# Patient Record
Sex: FEMALE | Race: WHITE | Employment: FULL TIME | URBAN - METROPOLITAN AREA
[De-identification: names, ages, dates, MRNs, and addresses within clinical notes are randomized per-mention and may not be internally consistent; named-entity substitution may affect disease eponyms.]

---

## 2017-02-01 DIAGNOSIS — C50.411 BREAST CANCER OF UPPER-OUTER QUADRANT OF RIGHT FEMALE BREAST (HCC): Primary | ICD-10-CM

## 2017-02-02 ENCOUNTER — TELEPHONE (OUTPATIENT)
Dept: ONCOLOGY | Age: 57
End: 2017-02-02

## 2017-02-02 NOTE — TELEPHONE ENCOUNTER
Dr. Dagoberto Muir office to set up this patient and they stated they will review the patients information and call the patient to schedule the appointment.

## 2017-02-09 RX ORDER — PREGABALIN 50 MG/1
CAPSULE ORAL
Qty: 90 CAP | Refills: 1 | Status: SHIPPED | OUTPATIENT
Start: 2017-02-09 | End: 2017-04-19 | Stop reason: SDUPTHER

## 2017-03-13 ENCOUNTER — TELEPHONE (OUTPATIENT)
Dept: ONCOLOGY | Age: 57
End: 2017-03-13

## 2017-03-13 NOTE — TELEPHONE ENCOUNTER
Called [facility] to check and see if the pts appointment was made and they stated on 3/8/17 the pt was seen.

## 2017-03-22 RX ORDER — TOREMIFENE CITRATE 60 MG/1
TABLET ORAL
Qty: 30 TAB | Refills: 5 | Status: SHIPPED | OUTPATIENT
Start: 2017-03-22 | End: 2017-09-14 | Stop reason: SDUPTHER

## 2017-03-24 ENCOUNTER — TELEPHONE (OUTPATIENT)
Dept: ONCOLOGY | Age: 57
End: 2017-03-24

## 2017-03-24 NOTE — TELEPHONE ENCOUNTER
Patient called and stated she would like to cancel her upcoming appointment. Patient stated that she is being followed by other doctors and that she thought the upcoming appointment was just a follow up any ways.

## 2017-04-17 NOTE — TELEPHONE ENCOUNTER
Per CARLOS Veronica Dr. would like to follow up with the patient to see how she is tolerating her Toremifene in June 2017 or July 2017. The patient is scheduled for an office visit on 6/19/17.

## 2017-04-25 ENCOUNTER — PATIENT MESSAGE (OUTPATIENT)
Dept: ONCOLOGY | Age: 57
End: 2017-04-25

## 2017-04-25 ENCOUNTER — HOSPITAL ENCOUNTER (OUTPATIENT)
Dept: GENERAL RADIOLOGY | Age: 57
Discharge: HOME | End: 2017-04-25
Payer: COMMERCIAL

## 2017-04-25 ENCOUNTER — TELEPHONE (OUTPATIENT)
Dept: ONCOLOGY | Age: 57
End: 2017-04-25

## 2017-04-25 ENCOUNTER — OFFICE VISIT (OUTPATIENT)
Dept: ONCOLOGY | Age: 57
End: 2017-04-25

## 2017-04-25 VITALS
RESPIRATION RATE: 18 BRPM | HEIGHT: 63 IN | BODY MASS INDEX: 36.11 KG/M2 | DIASTOLIC BLOOD PRESSURE: 71 MMHG | HEART RATE: 107 BPM | OXYGEN SATURATION: 97 % | SYSTOLIC BLOOD PRESSURE: 124 MMHG | TEMPERATURE: 97.1 F | WEIGHT: 203.8 LBS

## 2017-04-25 DIAGNOSIS — G89.29 CHRONIC NONINTRACTABLE HEADACHE, UNSPECIFIED HEADACHE TYPE: ICD-10-CM

## 2017-04-25 DIAGNOSIS — F41.9 ANXIETY: ICD-10-CM

## 2017-04-25 DIAGNOSIS — E55.9 VITAMIN D DEFICIENCY: ICD-10-CM

## 2017-04-25 DIAGNOSIS — C50.411 BREAST CANCER OF UPPER-OUTER QUADRANT OF RIGHT FEMALE BREAST (HCC): ICD-10-CM

## 2017-04-25 DIAGNOSIS — G62.9 NEUROPATHY: ICD-10-CM

## 2017-04-25 DIAGNOSIS — M85.80 OSTEOPENIA, UNSPECIFIED LOCATION: ICD-10-CM

## 2017-04-25 DIAGNOSIS — R51.9 CHRONIC NONINTRACTABLE HEADACHE, UNSPECIFIED HEADACHE TYPE: ICD-10-CM

## 2017-04-25 DIAGNOSIS — C50.411 BREAST CANCER OF UPPER-OUTER QUADRANT OF RIGHT FEMALE BREAST (HCC): Primary | ICD-10-CM

## 2017-04-25 DIAGNOSIS — M79.651 RIGHT THIGH PAIN: ICD-10-CM

## 2017-04-25 DIAGNOSIS — B37.2 SKIN YEAST INFECTION: ICD-10-CM

## 2017-04-25 DIAGNOSIS — M25.50 ARTHRALGIA, UNSPECIFIED JOINT: ICD-10-CM

## 2017-04-25 PROCEDURE — 73552 X-RAY EXAM OF FEMUR 2/>: CPT

## 2017-04-25 PROCEDURE — 72170 X-RAY EXAM OF PELVIS: CPT

## 2017-04-25 RX ORDER — NITROFURANTOIN 25; 75 MG/1; MG/1
CAPSULE ORAL
Refills: 2 | COMMUNITY
Start: 2017-04-19 | End: 2018-06-13 | Stop reason: ALTCHOICE

## 2017-04-25 RX ORDER — ALPRAZOLAM 0.25 MG/1
TABLET ORAL
Refills: 1 | COMMUNITY
Start: 2017-04-13 | End: 2021-10-27

## 2017-04-25 RX ORDER — PREGABALIN 50 MG/1
CAPSULE ORAL
Qty: 90 CAP | Refills: 1 | Status: SHIPPED | OUTPATIENT
Start: 2017-04-25 | End: 2017-07-05 | Stop reason: SDUPTHER

## 2017-04-25 NOTE — TELEPHONE ENCOUNTER
Received a call from the patient and verified ID x 2. The patient stated that she has \"severe pain\" in her right thigh and leg when she moves it and that there is no pain when it is not moving but when she moves it is a \"very sharp\" pain and rates it a ten out of ten pain on a scale of one to ten with ten being the worst pain and that she took Flexeril last night because she thought it may be muscular but it did not ease the pain \"at all\" and she feels that the pain is \"deeper\" than the muscle. Informed the patient that Dr. Marietta Hassan is ordering an x-ray and inquired if the patient would be able to have that completed today 4/25/17. The patient stated that she would be able to have that done today. Also, informed the patient that Dr. Marietta Hassan would like to see her tomorrow 4/26/17 and the patient verbalized understanding and denied any further questions or concerns. The call was transferred to Paul Spain to schedule an appointment for the patient tomorrow.

## 2017-04-25 NOTE — PROGRESS NOTES
Discussed results with patient. Offered her to come see us in the clinic today for further evaluation of her right thigh pain. She will come see me today between 3 and 3:30.

## 2017-04-25 NOTE — TELEPHONE ENCOUNTER
Called the patient's listed [facility] pharmacy and left a message with two patient identifiers and the patient's prescription for Lyrica 50 mg capsules. The paper prescription was shredded and discarded.

## 2017-04-25 NOTE — PROGRESS NOTES
[employer]  [address] [place] [address], [address] [facility]  [address]  W: [phone]  F: [phone]     f/u HEME/ONC CONSULT    Reason for visit: management of     Breast Cancer    Referring physician:  Dr. Tomas Yan physician:  Dr. Nabila Sharma, Dr. Ebenezer Peres    HPI:   Willy Collado is a 55 y.o.  female who I am seeing in f/u for management of breast cancer. She states she had a negative mammogram in 11/2013 and then felt a mass in her right breast in late 2/2014. Menarche at 16, first of 2 pregnancies at 26, menopause at 51, no history of HRT. FH + for her mother having breast cancer in her late 46s. No FH of ovarian cancer. Biopsy on 3/6/14 shows invasive mammary carcinoma, 0.7 cm, gr 3, ER + > 90%, AZ + > 90%, HER 2 negative (ratio 1.13; sig/nuc 3.7). 3/26/14 SLN bx 4/7, 1.5 cm largest met, + CARLOS. S/p DD AC-T q 2 weeks x 4 for each (60/600/175 mg/m2), started 4/2/14. Completed AC 5/14/14, taxol 7/9/14. S/p bilateral mastectomies on 8/14/14, left breast negative, right breast no residual disease, 0/11 LN involved, pCR, RCB-0, [id number].    10/18/14 expanders removed due to infected left breast expander. XRT 11/17/14-12/24/14    Started letrozole- 1/1/2015-1/20/16 (joint pain)  Anastrozole 2/12/16- 4/6/16  Tamoxifen 4/25/2016-5/8/16  Exemestane 5/24/16-8/4/16 (joint pain)  toremefine 9/28/16-    1/16/15 biopsy of right axilla was negative. Interval history:  In today for follow up. Today complains of: gr 2 fatigue, gr 2 hot flashes, anxiety, 8/10 pain in the right thigh, gr 1 sob, gr 1 rapid heartbeat, gr 2 neuropathy, gr 1 headache, gr 1 rectal pain. She has new right thigh pain. This started on Sunday night and has since been getting worse. It is greatly exacerbated by movement. She has tried aleve and flexeril with no relief. DX   Encounter Diagnoses   Name Primary?     Breast cancer of upper-outer quadrant of right female breast ([facility] [address] Yes  Anxiety     Osteopenia, unspecified location     Neuropathy     Vitamin D deficiency     Arthralgia, unspecified joint     Skin yeast infection     Chronic nonintractable headache, unspecified headache type     Right thigh pain         Past Medical History:   Diagnosis Date    Cancer [facility])     R breast cancer dx 3/2014; chemo    Depression     Diabetes ([facility] [address]     Dyspepsia and other specified disorders of function of stomach     Hypercholesterolemia     MARISA (obstructive sleep apnea)     wears cpap    Other and unspecified hyperlipidemia     Psychiatric disorder     Stroke [facility]) 1980's    ?slight stroke due to oral contraceptives    Thyroid disease     Type II or unspecified type diabetes mellitus without mention of complication, uncontrolled     Unspecified hypothyroidism      Past Surgical History:   Procedure Laterality Date    BREAST SURGERY PROCEDURE UNLISTED  2/26/14    excisionl bx of lymlh nodes    EXCISE HAND/FOOT NEUROMA      bilateral feet    HX BREAST RECONSTRUCTION  8/14/2014    BILATERAL BREAST RECONSTRUCTION WITH TISSUE EXPANDERS AND ALLODERM performed by Eunice Ni MD at [facility] MAIN OR    HX BREAST RECONSTRUCTION Bilateral 10/18/2014    BREAST TISSUE EXPANDER REMOVAL ( BILATERAL )  performed by Eunice Ni MD at [facility] MAIN OR    HX CERVICAL FUSION      Full ROM per pt    HX TONSILLECTOMY      HX VASCULAR ACCESS  3/26/14    adrian cath insertion    SHOULDER SURG PROC UNLISTED      right     Social History     Social History    Marital status: SINGLE     Spouse name: N/A    Number of children: N/A    Years of education: N/A     Social History Main Topics    Smoking status: Former Smoker     Packs/day: 1.00     Years: 30.00    Smokeless tobacco: Never Used    Alcohol use No    Drug use: No    Sexual activity: Not Asked     Other Topics Concern    None     Social History Narrative    Lives in [place] with 24 yo son and son's girlfriend.   Has a 33 yo daughter.  for many years. Works as a  at an accounting firm. Likes to amado. Family History   Problem Relation Age of Onset    Diabetes Mother      type 2    Thyroid Disease Mother     Heart Disease Mother     Cancer Mother 61     breast & lung    Hypertension Mother     Diabetes Father      type 1     Diabetes Sister      borderline    Stroke Neg Hx        Current Outpatient Prescriptions   Medication Sig Dispense Refill    LYRICA 50 mg capsule TAKE ONE CAPSULE BY MOUTH 3 TIMES A DAY 90 Cap 1    nitrofurantoin, macrocrystal-monohydrate, (MACROBID) 100 mg capsule TAKE ONE CAPSULE BY MOUTH EVERY DAY  2    FARESTON 60 mg tab TAKE 1 TABLET EVERY DAY 30 Tab 5    VICTOZA 2-DAKSHA 0.6 mg/0.1 mL (18 mg/3 mL) sub-q pen INJECT 1.2 MG ONCE A DAY SUBCUTANEOUSLY  3    levothyroxine (SYNTHROID) 200 mcg tablet Take 200 mcg by mouth Daily (before breakfast).  OTHER Breast Prosthesis  Mastectomy Bra    Dx: C50.919 1 Device 1    ACCU-CHEK COMPACT TEST strip   0    HUMULIN R U-500, CONC, KWIKPEN 500 unit/mL (3 mL) inpn   3    insulin CONCENTRATED regular (U-500 CONCENTRATED) 500 unit/mL soln by SubCUTAneous route.  metFORMIN (GLUCOPHAGE) 1,000 mg tablet Take 1,000 mg by mouth two (2) times daily (with meals).  lisinopril-hydrochlorothiazide (PRINZIDE, ZESTORETIC) 10-12.5 mg per tablet Take  by mouth daily.  FLUoxetine (PROZAC) 20 mg capsule Take 20 mg by mouth nightly.  rosuvastatin (CRESTOR) 10 mg tablet Take 10 mg by mouth nightly.  Insulin Needles, Disposable, (BD INSULIN PEN NEEDLE UF SHORT) 31 X 5/16 \" Ndle by Does Not Apply route two (2) times a day. 1 Package 11    ALPRAZolam (XANAX) 0.25 mg tablet TAKE 1 TABLET 3 TIMES A DAY AS NEEDED FOR ANXIETY  1    cyclobenzaprine (FLEXERIL) 5 mg tablet TAKE 1-2 TABLETS BY MOUTH EVERY 8 HOURS AS NEEDED FOR PAIN  1    diphenhydrAMINE (BENADRYL) 25 mg capsule Take 25 mg by mouth nightly as needed. Allergies   Allergen Reactions    Ceftin [Cefuroxime Axetil] Hives       Review of Systems    A comprehensive review of systems was performed and all systems were negative except for HPI and for the symptom report form, reviewed and scanned in.    Objective:  Physical Exam:  Visit Vitals    /71    Pulse (!) 107    Temp 97.1 °F (36.2 °C) (Temporal)    Resp 18    Ht 5' 3\" (1.6 m)    Wt 203 lb 12.8 oz (92.4 kg)    SpO2 97%    BMI 36.1 kg/m2       General:  Alert, cooperative, no distress, appears stated age. Head:  Normocephalic, without obvious abnormality, atraumatic. Eyes:  Conjunctivae/corneas clear. PERRL, EOMs intact. Throat: Lips, mucosa, and tongue normal.    Neck: Supple, symmetrical, trachea midline, no adenopathy, thyroid: no enlargement/tenderness/nodules   Back:   Symmetric, no curvature. ROM normal. No CVA tenderness. Lungs:   Clear to auscultation bilaterally. Chest wall:  No masses, mild tenderness right upper chest   Heart:  Regular rate and rhythm, S1, S2 normal, no murmur, click, rub or gallop. Breast Exam:  S/p bilateral mastectomies. Right breast implant has a hard knot laterally   Abdomen:   Soft, non tender to palpation. Bowel sounds normal. No organomegaly. Extremities: Hand dip joints ttp    Skin: No lesions, blisters or peeling. Lymph nodes: Cervical, supraclavicular, and axillary nodes normal.   Neurologic: CNII-XII intact. Diagnostic Imaging        Narrative   5/14/14 MAMMO  IMPRESSION: Stable mammogram. Next bilateral diagnostic Mammogram is   recommended in November, 2014. The patient was notified of these results. BIRADS 6: Known breast cancer. 3/20/14 CT c/a/p  IMPRESSION:   1. No evidence of metastatic disease. 2. Right breast mass with prominent but not pathologically enlarged right   axillary lymph nodes. 3. Hepatic steatosis.    4. Left renal cyst.       Bone scan:  Negative    3/24/14 dexa  Lumbar spine: L1-4   Bone mineral density (gm/cm2): 0.943   % of peak bone mass: 79   % for age matched controls: 78   T score: -2   Z score: -2.1   Hip: Right femoral neck   Bone mineral density (gm/cm2): 0.772   % of peak bone mass: 74   % for age matched controls: 78   T score: -1.9   Z score: -1.5   IMPRESSION:   This patient is osteopenic using the World Health Organization criteria   10 year probability of major osteoporotic fracture: 6.3%   10 year probability of hip fracture: 1.2%    3/16/15 LLE negative    5/8/15 brain MRI:  Normal    5/27/16 DEXA  Findings:     Femoral Neck:  Right  Bone mineral density (gm/cm2):?  0.831  % of peak bone mass: 80  % for age matched controls:? 80  T-score: -1.5  Z-score: -1.0     Total Hip: right  Bone mineral density (gm/cm2):  0.930  % of peak bone mass:   92  % for age matched controls:  80  T-score:   -0.6  Z-score:  -0.5     Lumbar Spine:  L1-4  Bone mineral density (gm/cm2):  0.984  % of peak bone mass:  83    % for age matched controls:  80  T-score:  -1.7  Z-score:  -1.7        IMPRESSION:     This patient is osteopenic using the World Health Organization criteria  10 year probability of major osteoporotic fracture:  5.8%  10 year probability of hip fracture:  0.4%      Lab Results  Lab Results   Component Value Date/Time    WBC 6.8 03/17/2015 10:35 AM    Hemoglobin (POC) 12.9 12/19/2016 02:46 PM    HGB 13.2 03/17/2015 10:35 AM    Hematocrit (POC) 38 12/19/2016 02:46 PM    HCT 37.7 03/17/2015 10:35 AM    PLATELET 132 37/86/1344 10:35 AM    MCV 86.9 03/17/2015 10:35 AM       Lab Results   Component Value Date/Time    Sodium 139 10/17/2014 07:10 PM    Potassium 3.5 10/17/2014 07:10 PM    Chloride 104 10/17/2014 07:10 PM    CO2 27 10/17/2014 07:10 PM    Anion gap 8 10/17/2014 07:10 PM    Glucose 155 10/17/2014 07:10 PM    BUN 7 10/17/2014 07:10 PM    Creatinine 0.52 10/17/2014 07:10 PM    BUN/Creatinine ratio 13 10/17/2014 07:10 PM    GFR est AA >60 10/17/2014 07:10 PM    GFR est non-AA >60 10/17/2014 07:10 PM    Calcium 8.9 10/17/2014 07:10 PM    AST (SGOT) 24 06/28/2011 08:22 AM    Alk. phosphatase 110 06/28/2011 08:22 AM    Protein, total 7.2 06/28/2011 08:22 AM    Albumin 4.7 06/28/2011 08:22 AM    A-G Ratio 1.9 06/28/2011 08:22 AM    ALT (SGPT) 36 06/28/2011 08:22 AM       Assessment/Plan:  55 y.o. female with IMC gr 3, ER+, OR +, HER 2 negative. [id number]. 4/7 LN involved. pCR s/p neoadjuvant chemo  PS 0    1. Breast cancer stage: IIIA clinically    Hormonal therapy: administered    Based on her mother's history of breast cancer, it was reasonable to genetic test this patient. BRCA 1/2 negative. She was unable to tolerate letrozole or anastrozole or exemestane due to joint pain. Tamoxifen started on 4/25/16 and stopped on 5/8/16 due to labile mood and swelling. Has been seen in survivorship    Has been seen by Mady Dunbar RD. Last eye exam: March 2017  Last gyn exam: April 2017    No evidence of recurrence. Continue toremefine 60 mg daily. She saw Dr. Jania Gold in Nov.      2. Depression/anxiety: stable, no SI/HI; on Prozac 30mg. Pt is currently attending breast cancer support group. 3. Osteopenia:  We previously discussed the data from [address] [place] [address], [facility] 2013, for the meta-analysis for adjuvant bisphosphonate use in breast cancer. We discussed that in postmenopausal women, it appears that the bisphosphate zolendronic acid, given as in ABCSG 12 at 4 mg IV q 6 months x 3 years, is beneficial in improving bone DFS and OS. We discussed side effects such as ONJ and the need to avoid invasive dental procedures while on these medications, renal damage, and hypocalcemia. Last dose, #5, 12/19/16. 4. Neuropathy: improved, followed by Dr. Eun Chapman at [facility] to 50 mg TID. She is diabetic. At this point neuropathy worsening is not due to chemotherapy, she is following with her Endocrinologist.     5. Vit D def: Vit D3 1000 IU every other day    6.  Joint pain, L hip and bilateral hands: improved with toremifene saw Dr. Adalgisa Harris for her trigger fingers, he thinks she has carpal tunnel; wearing brace at night. 7. L axilla skin candida: improved, using nystatin cream    8. Back and hip pain: bone scan negative on 9/27/16. Improved with steroid injection and flexeril. Also recommended acupuncture to help with her muscle tightness. 9. Headaches: intermittent. Brain MRI negative. 11. Bloating/Weight gain: stable weight, endocrine threrapy could be contributing. Will monitor. 12. Right thigh pain: new. Severe with movement, with a \"catch\". XR negative for abnormality. Continue aleve and flexeril PRN. Will order bone scan to r/o mets. Advised that if scan is negative she should see PCP or ortho. Follow-up Disposition:  Return in about 6 months (around 10/25/2017) for 6m tracey peralta.     Thersa Nageotte, MD

## 2017-05-04 ENCOUNTER — HOSPITAL ENCOUNTER (OUTPATIENT)
Dept: NUCLEAR MEDICINE | Age: 57
Discharge: HOME | End: 2017-05-04
Attending: NURSE PRACTITIONER
Payer: COMMERCIAL

## 2017-05-04 DIAGNOSIS — B37.2 SKIN YEAST INFECTION: ICD-10-CM

## 2017-05-04 DIAGNOSIS — F41.9 ANXIETY: ICD-10-CM

## 2017-05-04 DIAGNOSIS — G62.9 NEUROPATHY: ICD-10-CM

## 2017-05-04 DIAGNOSIS — C50.411 BREAST CANCER OF UPPER-OUTER QUADRANT OF RIGHT FEMALE BREAST (HCC): ICD-10-CM

## 2017-05-04 DIAGNOSIS — M85.80 OSTEOPENIA, UNSPECIFIED LOCATION: ICD-10-CM

## 2017-05-04 DIAGNOSIS — E55.9 VITAMIN D DEFICIENCY: ICD-10-CM

## 2017-05-04 DIAGNOSIS — M25.50 ARTHRALGIA, UNSPECIFIED JOINT: ICD-10-CM

## 2017-05-04 PROCEDURE — 78306 BONE IMAGING WHOLE BODY: CPT

## 2017-05-12 ENCOUNTER — TELEPHONE (OUTPATIENT)
Dept: ONCOLOGY | Age: 57
End: 2017-05-12

## 2017-05-12 DIAGNOSIS — E87.20 LACTIC ACIDOSIS: Primary | ICD-10-CM

## 2017-05-12 DIAGNOSIS — C50.411 BREAST CANCER OF UPPER-OUTER QUADRANT OF RIGHT FEMALE BREAST (HCC): ICD-10-CM

## 2017-05-12 NOTE — TELEPHONE ENCOUNTER
Spoke with patient in regards to recent elevated lactic acid levels. Pt has been off of metformin for 1 week and her lactic acid continues to trend up. The patient reports feeling well. She denies any fever or signs of infection. Her mentation is normal. I have ordered a CTA abd/pelvis to rule out any mesenteric ischemia. I also spoke with Dr. Danyell Jenkins her endocrinologist. She did not feel like this is worrisome. She advised that we stop the BP med and have the patient over hydrate for the next week. I have passed this on to MsMikael Lsahanda Aguiaril who verbalized understanding and had no further questions. We will recheck labs in 1 week. Pt advised to proceed to nearest ED for any fever or signs of infection, also for symptoms that she could not explain.

## 2017-05-15 ENCOUNTER — OFFICE VISIT (OUTPATIENT)
Dept: SURGERY | Age: 57
End: 2017-05-15

## 2017-05-15 VITALS
DIASTOLIC BLOOD PRESSURE: 81 MMHG | HEIGHT: 63 IN | SYSTOLIC BLOOD PRESSURE: 128 MMHG | WEIGHT: 204.5 LBS | HEART RATE: 91 BPM | BODY MASS INDEX: 36.23 KG/M2 | OXYGEN SATURATION: 94 % | TEMPERATURE: 96.2 F | RESPIRATION RATE: 18 BRPM

## 2017-05-15 DIAGNOSIS — C50.411 BREAST CANCER OF UPPER-OUTER QUADRANT OF RIGHT FEMALE BREAST (HCC): Primary | ICD-10-CM

## 2017-05-15 RX ORDER — LIDOCAINE 50 MG/G
PATCH TOPICAL
Qty: 15 EACH | Refills: 6 | Status: SHIPPED | OUTPATIENT
Start: 2017-05-15 | End: 2018-05-30 | Stop reason: SDUPTHER

## 2017-05-15 NOTE — MR AVS SNAPSHOT
Visit Information Date & Time Provider Department Dept. Phone Encounter #  
 5/15/2017  8:40 AM Venkata Trujillo MD [facility] [address] [id number] Your Appointments 6/19/2017  1:15 PM  
ESTABLISHED PATIENT with Marcelle Nagel NP  
[facility] [address]) Appt Note: tracey peralta/rustam, zom[address] [facility], [address] [facility] [address]  
[phone]  
  
   
 [address] [place] [address], [address]  
  
    
 12/18/2017  8:00 AM  
ESTABLISHED PATIENT with Venkata Trujillo MD  
[facility] ([address]) Appt Note: 6 m[address] [facility], [address] [facility], [address] [phone]  
[address], [address] [facility], [address] [facility] [address] [phone] Upcoming Health Maintenance Date Due Hepatitis C Screening 1960 Pneumococcal 19-64 Highest Risk (1 of 3 - PCV13) 7/7/1980 DTaP/Tdap/Td series (1 - Tdap) 7/7/1982 FOBT Q 1 YEAR AGE 50-75 7/7/2011 EYE EXAM RETINAL OR DILATED Q1 6/15/2014 FOOT EXAM Q1 8/15/2014 HEMOGLOBIN A1C Q6M 1/28/2015 MICROALBUMIN Q1 7/28/2015 LIPID PANEL Q1 7/28/2015 PAP AKA CERVICAL CYTOLOGY 5/15/2016 BREAST CANCER SCRN MAMMOGRAM 5/22/2016 INFLUENZA AGE 9 TO ADULT 8/1/2017 Allergies as of 5/15/2017  Review Complete On: 5/15/2017 By: Venkata Trujillo MD  
  
 Severity Noted Reaction Type Reactions Ceftin [Cefuroxime Axetil] Medium 09/30/2010    Hives Current Immunizations  Reviewed on 6/20/2016 Name Date Influenza Vaccine 11/4/2014 Not reviewed this visit You Were Diagnosed With   
  
 Codes Comments Breast cancer of upper-outer quadrant of right female breast ([facility] [address]    -  Primary ICD-10-CM: T35.465 ICD-9-CM: 174.4 Vitals BP Pulse Temp Resp Height(growth percentile) Weight(growth percentile) 128/81 (BP 1 Location: Left arm, BP Patient Position: Sitting) 91 96.2 °F (35.7 °C) (Oral) 18 5' 3\" (1.6 m) 204 lb 8 oz (92.8 kg) SpO2 BMI OB Status Smoking Status 94% 36.23 kg/m2 Postmenopausal Former Smoker BMI and BSA Data Body Mass Index Body Surface Area  
 36.23 kg/m 2 2.03 m 2 Preferred Pharmacy Pharmacy Name Phone [facility][id number] - [place], [place] - [address] [phone] Your Updated Medication List  
  
   
This list is accurate as of: 5/15/17  1:24 PM.  Always use your most recent med list.  
  
  
  
  
 ACCU-CHEK COMPACT TEST Strp Generic drug:  Blood Sugar Diagnostic, Drum ALPRAZolam 0.25 mg tablet Commonly known as:  XANAX  
TAKE 1 TABLET 3 TIMES A DAY AS NEEDED FOR ANXIETY  
  
 BENADRYL 25 mg capsule Generic drug:  diphenhydrAMINE Take 25 mg by mouth nightly as needed. CRESTOR 10 mg tablet Generic drug:  rosuvastatin Take 10 mg by mouth nightly. cyclobenzaprine 5 mg tablet Commonly known as:  FLEXERIL  
TAKE 1-2 TABLETS BY MOUTH EVERY 8 HOURS AS NEEDED FOR PAIN  
  
 FARESTON 60 mg Tab Generic drug:  toremifene TAKE 1 TABLET EVERY DAY  
  
 * insulin CONCENTRATED regular 500 unit/mL Soln Commonly known as:  U-500 CONCENTRATED  
by SubCUTAneous route. * HumuLIN R U-500 (Conc) Kwikpen 500 unit/mL (3 mL) Inpn subQ pen Generic drug:  insulin CONCENTRATED regular Insulin Needles (Disposable) 31 gauge x 5/16\" Ndle Commonly known as:  BD INSULIN PEN NEEDLE UF SHORT  
by Does Not Apply route two (2) times a day. levothyroxine 200 mcg tablet Commonly known as:  SYNTHROID Take 200 mcg by mouth Daily (before breakfast). lidocaine 5 % Commonly known as:  Oliver Pat Apply patch to the affected area for 12 hours a day and remove for 12 hours a day. lisinopril-hydroCHLOROthiazide 10-12.5 mg per tablet Commonly known as:  Veronica Rower Take  by mouth daily. LYRICA 50 mg capsule Generic drug:  pregabalin TAKE ONE CAPSULE BY MOUTH 3 TIMES A DAY  
  
 metFORMIN 1,000 mg tablet Commonly known as:  GLUCOPHAGE Take 1,000 mg by mouth two (2) times daily (with meals). nitrofurantoin (macrocrystal-monohydrate) 100 mg capsule Commonly known as:  MACROBID  
TAKE ONE CAPSULE BY MOUTH EVERY DAY  
  
 OTHER Breast Prosthesis Mastectomy Bra  Dx: B76.770 PROzac 20 mg capsule Generic drug:  FLUoxetine Take 20 mg by mouth nightly. VICTOZA 2-DAKSHA 0.6 mg/0.1 mL (18 mg/3 mL) sub-q pen Generic drug:  Liraglutide INJECT 1.2 MG ONCE A DAY SUBCUTANEOUSLY * Notice: This list has 2 medication(s) that are the same as other medications prescribed for you. Read the directions carefully, and ask your doctor or other care provider to review them with you. Prescriptions Sent to Pharmacy Refills  
 lidocaine (LIDODERM) 5 % 6 Sig: Apply patch to the affected area for 12 hours a day and remove for 12 hours a day. Class: Normal  
 Pharmacy: [facility]/pharmacy #[id number] [facility] [id number] AT [address] [facility] [address] Ph #: [phone] We Performed the Following AMB SUPPLY ORDER [[id number] Custom] Comments:  
 Surgical bras AMB SUPPLY ORDER [[id number] Custom] Comments:  
 Bilateral breast prostheses To-Do List   
 05/19/2017 1:00 PM  
  Appointment with Svetlana Richter at [place] [address] ([phone])  
  
 06/19/2017 2:00 PM  
  Appointment with Skip Hobson at [place] [address] ([phone]) [facility]! Dear Fausto Campoverde: Thank you for requesting a [employer] account. Our records indicate that you already have an active [employer] account. You can access your account anytime at [email or web address] Did you know that you can access your hospital and ER discharge instructions at any time in [facility] [employer]? You can also review all of your test results from your hospital stay or ER visit. Additional Information If you have questions, please visit the Frequently Asked Questions section of the Advanced [employer] website at [email or web address]/. Remember, Advanced Telemetry is NOT to be used for urgent needs. For medical emergencies, dial 911. Now available from your iPhone and Android! Please provide this summary of care documentation to your next provider. Your primary care clinician is listed as Hannah Luther. If you have any questions after today's visit, please call [phone].

## 2017-05-15 NOTE — PROGRESS NOTES
HISTORY OF PRESENT ILLNESS  Keaton Mcdermott is a 55 y. o.white female who comes in for follow up of her breast cancer and concern for a left axillary nodule  Follow-up   Associated symptoms include chest pain. Pertinent negatives include no abdominal pain, no headaches and no shortness of breath. Breast Cancer   Associated symptoms include chest pain. Pertinent negatives include no abdominal pain, no headaches and no shortness of breath. Breast Problem   Associated symptoms include chest pain. Pertinent negatives include no abdominal pain, no headaches and no shortness of breath. Associated symptoms include chest pain. Pertinent negatives include no abdominal pain, no headaches and no shortness of breath. Chest Pain   Associated symptoms include back pain. Pertinent negatives include no abdominal pain, no claudication, no cough, no diaphoresis, no dizziness, no fever, no headaches, no hemoptysis, no malaise/fatigue, no nausea, no orthopnea, no palpitations, no PND, no shortness of breath, no sputum production, no vomiting and no weakness. Associated symptoms include chest pain. Pertinent negatives include no abdominal pain, no headaches and no shortness of breath. She was diagnosed in March 2014 with invasive mammary carcinoma ER/WI positive and her2/carlos unamplified. Breast MRI demonstrated the lesion to be 5 x 7.5 cm and SLNB demonstrated 4/7 positive nodes. She has completed neoadjuvant chemotherapy with Dr Kirit Mathis and repeat MRI 7/15/2014 demonstrated near complete response with a 6 x 6 x 4 mm focus of enhancement near the 9-10 o'clock region middle third. She had menarche at 16, first of two pregnancies at 26, menopause at 51 and did not take HRT. Her mother had breast cancer in her 46s. No family hx of ovarian cancer. She underwent bilateral mastectomy and right ALND with reconstruction and had pCR. She developed infection in her expanders and they were removed Dimitri Dec).    She completed radiation and is took letrozole and anastrozole but did not tolerated them. She recently started tamoxifen but she gained 8 pounds in one week and felt terrible and stopped it. She has a nodular area laterally on the right chest which I did an FNA on in Jan 2015 and came back with some lymphocytes and blood but no sign of malignancy. She has pain under her right arm/axillary area. She had a right sided headache in May 2015 and brain MRI was negative. US core bx on 7/31/2015 demonstrated fragments of mature fibroadipose tissue with areas of fat necrosis without atypia or malignancy. Dr Rony Cruz tried fatgrafting and modification of her cosmesis in Nov 2015 without success. She still has some pain on the right more than the left. She reports noting a pea sized nodule under the left arm.         Past Medical History:   Diagnosis Date    Cancer [facility])     R breast cancer dx 3/2014; chemo    Depression     Diabetes ([address]     Dyspepsia and other specified disorders of function of stomach     Hypercholesterolemia     MARISA (obstructive sleep apnea)     wears cpap    Other and unspecified hyperlipidemia     Psychiatric disorder     Stroke [facility]) 1980's    ?slight stroke due to oral contraceptives    Thyroid disease     Type II or unspecified type diabetes mellitus without mention of complication, uncontrolled     Unspecified hypothyroidism      Past Surgical History:   Procedure Laterality Date    BREAST SURGERY PROCEDURE UNLISTED  2/26/14    excisionl bx of lymlh nodes    EXCISE HAND/FOOT NEUROMA      bilateral feet    HX BREAST RECONSTRUCTION  8/14/2014    BILATERAL BREAST RECONSTRUCTION WITH TISSUE EXPANDERS AND ALLODERM performed by Zuhair Khan MD at [facility] MAIN OR    HX BREAST RECONSTRUCTION Bilateral 10/18/2014    BREAST TISSUE EXPANDER REMOVAL ( BILATERAL )  performed by Zuhair Khan MD at [facility] MAIN OR    HX CERVICAL FUSION      Full ROM per pt    HX TONSILLECTOMY      HX VASCULAR ACCESS 3/26/14    adrian cath insertion    SHOULDER SURG PROC UNLISTED      right     Family History   Problem Relation Age of Onset    Diabetes Mother      type 2    Thyroid Disease Mother     Heart Disease Mother     Cancer Mother 61     breast & lung    Hypertension Mother     Diabetes Father      type 1     Diabetes Sister      borderline    Stroke Neg Hx      Social History   Substance Use Topics    Smoking status: Former Smoker     Packs/day: 1.00     Years: 30.00    Smokeless tobacco: Never Used    Alcohol use No     Current Outpatient Prescriptions   Medication Sig    LYRICA 50 mg capsule TAKE ONE CAPSULE BY MOUTH 3 TIMES A DAY    ALPRAZolam (XANAX) 0.25 mg tablet TAKE 1 TABLET 3 TIMES A DAY AS NEEDED FOR ANXIETY    nitrofurantoin, macrocrystal-monohydrate, (MACROBID) 100 mg capsule TAKE ONE CAPSULE BY MOUTH EVERY DAY    FARESTON 60 mg tab TAKE 1 TABLET EVERY DAY    VICTOZA 2-DAKSHA 0.6 mg/0.1 mL (18 mg/3 mL) sub-q pen INJECT 1.2 MG ONCE A DAY SUBCUTANEOUSLY    levothyroxine (SYNTHROID) 200 mcg tablet Take 200 mcg by mouth Daily (before breakfast).  OTHER Breast Prosthesis  Mastectomy Bra    Dx: C50.919    cyclobenzaprine (FLEXERIL) 5 mg tablet TAKE 1-2 TABLETS BY MOUTH EVERY 8 HOURS AS NEEDED FOR PAIN    ACCU-CHEK COMPACT TEST strip     HUMULIN R U-500, CONC, KWIKPEN 500 unit/mL (3 mL) inpn     insulin CONCENTRATED regular (U-500 CONCENTRATED) 500 unit/mL soln by SubCUTAneous route.  FLUoxetine (PROZAC) 20 mg capsule Take 20 mg by mouth nightly.  rosuvastatin (CRESTOR) 10 mg tablet Take 10 mg by mouth nightly.  Insulin Needles, Disposable, (BD INSULIN PEN NEEDLE UF SHORT) 31 X 5/16 \" Ndle by Does Not Apply route two (2) times a day.  diphenhydrAMINE (BENADRYL) 25 mg capsule Take 25 mg by mouth nightly as needed.  metFORMIN (GLUCOPHAGE) 1,000 mg tablet Take 1,000 mg by mouth two (2) times daily (with meals).     lisinopril-hydrochlorothiazide (PRINZIDE, ZESTORETIC) 10-12.5 mg per tablet Take  by mouth daily. No current facility-administered medications for this visit. Allergies   Allergen Reactions    Ceftin [Cefuroxime Axetil] Hives       Review of Systems   Constitutional: Negative for chills, diaphoresis, fever, malaise/fatigue and weight loss. HENT: Negative for congestion, ear pain and sore throat. Eyes: Negative for blurred vision and pain. Respiratory: Negative for cough, hemoptysis, sputum production, shortness of breath, wheezing and stridor. Cardiovascular: Positive for chest pain. Negative for palpitations, orthopnea, claudication, leg swelling and PND. Gastrointestinal: Negative for abdominal pain, blood in stool, constipation, diarrhea, heartburn, melena, nausea and vomiting. Genitourinary: Negative for dysuria, flank pain, frequency, hematuria and urgency. Musculoskeletal: Positive for back pain. Negative for joint pain, myalgias and neck pain. Skin: Negative for itching and rash. Neurological: Negative for dizziness, tremors, focal weakness, seizures, weakness and headaches. Endo/Heme/Allergies: Negative for polydipsia. Psychiatric/Behavioral: Negative for depression and memory loss. The patient is not nervous/anxious. Visit Vitals    /81 (BP 1 Location: Left arm, BP Patient Position: Sitting)    Pulse 91    Temp 96.2 °F (35.7 °C) (Oral)    Resp 18    Ht 5' 3\" (1.6 m)    Wt 92.8 kg (204 lb 8 oz)    SpO2 94%    BMI 36.23 kg/m2       Physical Exam   Constitutional: She is oriented to person, place, and time. She appears well-developed and well-nourished. No distress. HENT:   Head: Normocephalic and atraumatic. Mouth/Throat: Oropharynx is clear and moist. No oropharyngeal exudate. Eyes: Conjunctivae and EOM are normal. Pupils are equal, round, and reactive to light. No scleral icterus. Neck: Normal range of motion. Neck supple. No JVD present. No tracheal deviation present. No thyromegaly present.    Cardiovascular: Normal rate and regular rhythm. Exam reveals no gallop and no friction rub. No murmur heard. Pulmonary/Chest: Effort normal and breath sounds normal. No respiratory distress. She has no wheezes. She has no rales. Right breast exhibits skin change (a lot of redundant skin and subcutaneous tissue with ??nodular ridge near axilla). Right breast exhibits no mass (fullness in UOQ but no discernable mass) and no tenderness. Left breast exhibits no mass, no skin change and no tenderness. Breasts are symmetrical.       Abdominal: Soft. Bowel sounds are normal. She exhibits no distension and no mass. There is no tenderness. There is no rebound and no guarding. Musculoskeletal: Normal range of motion. She exhibits no edema. Lymphadenopathy:     She has no cervical adenopathy. She has no axillary adenopathy. Right: No supraclavicular adenopathy present. Left: No supraclavicular adenopathy present. Neurological: She is alert and oriented to person, place, and time. No cranial nerve deficit. Skin: Skin is warm and dry. No rash noted. She is not diaphoretic. No erythema. No pallor. Psychiatric: She has a normal mood and affect. Her behavior is normal. Judgment and thought content normal.                          ASSESSMENT and PLAN  1. Right breast cancer T3N2M0:  JOSE at 4 years and 2 months  Finished neoadjuvant chemotherapy and adjuvant radiation. She is now on Ferestan  She will discuss alternative therapies with Dr Alistair Bradley at next visit    2. Right axillary/breast nodule is ? ?scar vs recurrence (doubtful)  It is unchanged in 6 months and FNA was negative. 3.  Right axillary/chest wall pain. Persistent. Likely post surgical and post radiation. I doubt this will improve very much  lidoderm patch    4. Lymphedema of RUE. Mild. Has seen lymphedema clinic and is currently not wearing compression garment  5. Poor cosmesis since implant loss. RX bras and prostheses  6.   ?left axillary nodule is likely dermal cyst  7. Elevated lactic acid. No source as yet.   She has a CTA of the A/P scheduled    RTC 6 months    Timothy Clark MD FACS        Med Onc:  Marvin Baugh MD  PCP:  Karen Babcock MD

## 2017-05-16 ENCOUNTER — HOSPITAL ENCOUNTER (OUTPATIENT)
Dept: CT IMAGING | Age: 57
Discharge: HOME | End: 2017-05-16
Attending: NURSE PRACTITIONER
Payer: COMMERCIAL

## 2017-05-16 DIAGNOSIS — E87.20 LACTIC ACIDOSIS: ICD-10-CM

## 2017-05-16 DIAGNOSIS — C50.411 BREAST CANCER OF UPPER-OUTER QUADRANT OF RIGHT FEMALE BREAST (HCC): ICD-10-CM

## 2017-05-16 LAB — CREAT BLD-MCNC: 0.5 MG/DL (ref 0.6–1.3)

## 2017-05-16 PROCEDURE — 74011636320 HC RX REV CODE- 636/320: Performed by: RADIOLOGY

## 2017-05-16 PROCEDURE — 74174 CTA ABD&PLVS W/CONTRAST: CPT

## 2017-05-16 PROCEDURE — 82565 ASSAY OF CREATININE: CPT

## 2017-05-16 RX ADMIN — IOPAMIDOL 100 ML: 755 INJECTION, SOLUTION INTRAVENOUS at 13:58

## 2017-05-19 ENCOUNTER — HOSPITAL ENCOUNTER (OUTPATIENT)
Dept: INFUSION THERAPY | Age: 57
Discharge: HOME | End: 2017-05-19
Payer: COMMERCIAL

## 2017-05-19 VITALS
HEART RATE: 59 BPM | TEMPERATURE: 98.4 F | DIASTOLIC BLOOD PRESSURE: 80 MMHG | RESPIRATION RATE: 16 BRPM | SYSTOLIC BLOOD PRESSURE: 127 MMHG

## 2017-05-19 LAB
ALBUMIN SERPL BCP-MCNC: 4.1 G/DL (ref 3.5–5)
ALBUMIN/GLOB SERPL: 1.4 {RATIO} (ref 1.1–2.2)
ALP SERPL-CCNC: 58 U/L (ref 45–117)
ALT SERPL-CCNC: 49 U/L (ref 12–78)
ANION GAP BLD CALC-SCNC: 4 MMOL/L (ref 5–15)
AST SERPL W P-5'-P-CCNC: 38 U/L (ref 15–37)
BASOPHILS # BLD AUTO: 0 K/UL (ref 0–0.1)
BASOPHILS # BLD: 0 % (ref 0–1)
BILIRUB SERPL-MCNC: 0.4 MG/DL (ref 0.2–1)
BUN SERPL-MCNC: 10 MG/DL (ref 6–20)
BUN/CREAT SERPL: 12 (ref 12–20)
CALCIUM SERPL-MCNC: 9.6 MG/DL (ref 8.5–10.1)
CHLORIDE SERPL-SCNC: 106 MMOL/L (ref 97–108)
CO2 SERPL-SCNC: 33 MMOL/L (ref 21–32)
CREAT SERPL-MCNC: 0.82 MG/DL (ref 0.55–1.02)
EOSINOPHIL # BLD: 0.2 K/UL (ref 0–0.4)
EOSINOPHIL NFR BLD: 3 % (ref 0–7)
ERYTHROCYTE [DISTWIDTH] IN BLOOD BY AUTOMATED COUNT: 13.1 % (ref 11.5–14.5)
GLOBULIN SER CALC-MCNC: 3 G/DL (ref 2–4)
GLUCOSE SERPL-MCNC: 133 MG/DL (ref 65–100)
HCT VFR BLD AUTO: 38.7 % (ref 35–47)
HGB BLD-MCNC: 13.5 G/DL (ref 11.5–16)
LACTATE SERPL-SCNC: 3.6 MMOL/L (ref 0.4–2)
LYMPHOCYTES # BLD AUTO: 31 % (ref 12–49)
LYMPHOCYTES # BLD: 1.9 K/UL (ref 0.8–3.5)
MCH RBC QN AUTO: 30.2 PG (ref 26–34)
MCHC RBC AUTO-ENTMCNC: 34.9 G/DL (ref 30–36.5)
MCV RBC AUTO: 86.6 FL (ref 80–99)
MONOCYTES # BLD: 0.5 K/UL (ref 0–1)
MONOCYTES NFR BLD AUTO: 9 % (ref 5–13)
NEUTS SEG # BLD: 3.5 K/UL (ref 1.8–8)
NEUTS SEG NFR BLD AUTO: 57 % (ref 32–75)
PLATELET # BLD AUTO: 197 K/UL (ref 150–400)
POTASSIUM SERPL-SCNC: 4.4 MMOL/L (ref 3.5–5.1)
PROT SERPL-MCNC: 7.1 G/DL (ref 6.4–8.2)
RBC # BLD AUTO: 4.47 M/UL (ref 3.8–5.2)
SODIUM SERPL-SCNC: 143 MMOL/L (ref 136–145)
WBC # BLD AUTO: 6.1 K/UL (ref 3.6–11)

## 2017-05-19 PROCEDURE — 83605 ASSAY OF LACTIC ACID: CPT | Performed by: INTERNAL MEDICINE

## 2017-05-19 PROCEDURE — 80053 COMPREHEN METABOLIC PANEL: CPT | Performed by: INTERNAL MEDICINE

## 2017-05-19 PROCEDURE — 36415 COLL VENOUS BLD VENIPUNCTURE: CPT | Performed by: INTERNAL MEDICINE

## 2017-05-19 PROCEDURE — 85025 COMPLETE CBC W/AUTO DIFF WBC: CPT | Performed by: INTERNAL MEDICINE

## 2017-05-19 NOTE — PROGRESS NOTES
Blood pressure 127/80, pulse (!) 59, temperature 98.4 °F (36.9 °C), resp. rate 16. Pt arrived at 1254,labs drawn peripherally from left [facility]. Pt tolerated well and left at 1324.

## 2017-06-15 RX ORDER — ZOLEDRONIC ACID 4 MG/100ML
4 SOLUTION INTRAVENOUS ONCE
Status: COMPLETED | OUTPATIENT
Start: 2017-06-19 | End: 2017-06-19

## 2017-06-19 ENCOUNTER — OFFICE VISIT (OUTPATIENT)
Dept: ONCOLOGY | Age: 57
End: 2017-06-19

## 2017-06-19 ENCOUNTER — HOSPITAL ENCOUNTER (OUTPATIENT)
Dept: INFUSION THERAPY | Age: 57
Discharge: HOME | End: 2017-06-19
Payer: COMMERCIAL

## 2017-06-19 VITALS
WEIGHT: 206.2 LBS | DIASTOLIC BLOOD PRESSURE: 60 MMHG | HEIGHT: 63 IN | OXYGEN SATURATION: 95 % | HEART RATE: 92 BPM | SYSTOLIC BLOOD PRESSURE: 109 MMHG | BODY MASS INDEX: 36.54 KG/M2 | TEMPERATURE: 98.4 F

## 2017-06-19 VITALS
SYSTOLIC BLOOD PRESSURE: 105 MMHG | DIASTOLIC BLOOD PRESSURE: 76 MMHG | RESPIRATION RATE: 16 BRPM | HEART RATE: 84 BPM | OXYGEN SATURATION: 97 % | TEMPERATURE: 98.6 F

## 2017-06-19 DIAGNOSIS — C50.411 BREAST CANCER OF UPPER-OUTER QUADRANT OF RIGHT FEMALE BREAST (HCC): Primary | ICD-10-CM

## 2017-06-19 DIAGNOSIS — G89.29 CHRONIC NONINTRACTABLE HEADACHE, UNSPECIFIED HEADACHE TYPE: ICD-10-CM

## 2017-06-19 DIAGNOSIS — F41.9 ANXIETY: ICD-10-CM

## 2017-06-19 DIAGNOSIS — R79.89 LACTIC ACID BLOOD INCREASED: ICD-10-CM

## 2017-06-19 DIAGNOSIS — M25.50 ARTHRALGIA, UNSPECIFIED JOINT: ICD-10-CM

## 2017-06-19 DIAGNOSIS — G62.9 NEUROPATHY: ICD-10-CM

## 2017-06-19 DIAGNOSIS — E55.9 VITAMIN D DEFICIENCY: ICD-10-CM

## 2017-06-19 DIAGNOSIS — M85.80 OSTEOPENIA, UNSPECIFIED LOCATION: ICD-10-CM

## 2017-06-19 DIAGNOSIS — R51.9 CHRONIC NONINTRACTABLE HEADACHE, UNSPECIFIED HEADACHE TYPE: ICD-10-CM

## 2017-06-19 LAB
ALBUMIN SERPL BCP-MCNC: 3.6 G/DL (ref 3.5–5)
ANION GAP BLD CALC-SCNC: 10 MMOL/L (ref 5–15)
BUN SERPL-MCNC: 10 MG/DL (ref 6–20)
BUN/CREAT SERPL: 12 (ref 12–20)
CALCIUM SERPL-MCNC: 8.5 MG/DL (ref 8.5–10.1)
CHLORIDE SERPL-SCNC: 105 MMOL/L (ref 97–108)
CO2 SERPL-SCNC: 26 MMOL/L (ref 21–32)
CREAT SERPL-MCNC: 0.85 MG/DL (ref 0.55–1.02)
GLUCOSE SERPL-MCNC: 210 MG/DL (ref 65–100)
LACTATE SERPL-SCNC: 1.9 MMOL/L (ref 0.4–2)
PHOSPHATE SERPL-MCNC: 3.4 MG/DL (ref 2.6–4.7)
POTASSIUM SERPL-SCNC: 3.8 MMOL/L (ref 3.5–5.1)
SODIUM SERPL-SCNC: 141 MMOL/L (ref 136–145)
VIT B12 SERPL-MCNC: 412 PG/ML (ref 211–911)

## 2017-06-19 PROCEDURE — 74011250636 HC RX REV CODE- 250/636: Performed by: NURSE PRACTITIONER

## 2017-06-19 PROCEDURE — 82607 VITAMIN B-12: CPT | Performed by: NURSE PRACTITIONER

## 2017-06-19 PROCEDURE — 36415 COLL VENOUS BLD VENIPUNCTURE: CPT | Performed by: NURSE PRACTITIONER

## 2017-06-19 PROCEDURE — 83605 ASSAY OF LACTIC ACID: CPT | Performed by: NURSE PRACTITIONER

## 2017-06-19 PROCEDURE — 96374 THER/PROPH/DIAG INJ IV PUSH: CPT

## 2017-06-19 PROCEDURE — 80069 RENAL FUNCTION PANEL: CPT | Performed by: NURSE PRACTITIONER

## 2017-06-19 RX ADMIN — ZOLEDRONIC ACID 4 MG: 0.04 INJECTION, SOLUTION INTRAVENOUS at 16:13

## 2017-06-19 NOTE — PROGRESS NOTES
1440 Pt admit to [facility] for Zometa ambulatory in stable condition. Assessment completed. No new concerns voiced. Peripheral IV established left hand with positive blood return. Labs drawn per order and sent for processing. Normal Saline started at [facility]. Labs reviewed and medication ordered. Visit Vitals    /76    Pulse 84    Temp 98.6 °F (37 °C)    Resp 16    SpO2 97%    Breastfeeding No       Medications:  Normal Saline KVO  Zometa    1635 Pt tolerated treatment well. Peripheral IV  maintained positive blood return throughout treatment, flushed with positive blood return and removed at conclusion. D/c home ambulatory in no distress. Pt has no further appointments scheduled at this time.     Recent Results (from the past 12 hour(s))   RENAL FUNCTION PANEL    Collection Time: 06/19/17  2:51 PM   Result Value Ref Range    Sodium 141 136 - 145 mmol/L    Potassium 3.8 3.5 - 5.1 mmol/L    Chloride 105 97 - 108 mmol/L    CO2 26 21 - 32 mmol/L    Anion gap 10 5 - 15 mmol/L    Glucose 210 (H) 65 - 100 mg/dL    BUN 10 6 - 20 MG/DL    Creatinine 0.85 0.55 - 1.02 MG/DL    BUN/Creatinine ratio 12 12 - 20      GFR est AA >60 >60 ml/min/1.73m2    GFR est non-AA >60 >60 ml/min/1.73m2    Calcium 8.5 8.5 - 10.1 MG/DL    Phosphorus 3.4 2.6 - 4.7 MG/DL    Albumin 3.6 3.5 - 5.0 g/dL   VITAMIN B12    Collection Time: 06/19/17  3:21 PM   Result Value Ref Range    Vitamin B12 412 211 - 911 pg/mL   LACTIC ACID, PLASMA    Collection Time: 06/19/17  3:21 PM   Result Value Ref Range    Lactic acid 1.9 0.4 - 2.0 MMOL/L

## 2017-06-19 NOTE — MR AVS SNAPSHOT
Visit Information Date & Time Provider Department Dept. Phone Encounter #  
 6/19/2017  1:15 PM Esther Hobbs, [address] at [address] [place] [address] Rd [id number] Follow-up Instructions Return for 6-7m tracey peralta. Follow-up and Disposition History Your Appointments 12/18/2017  8:00 AM  
ESTABLISHED PATIENT with Safia Pearson MD  
[facility] Dr. Gold Benavidez [facility] ([facility]) Appt Note: 6 mth Br [address] [facility], [address], [address] [address]  
[address], [address], [address] [facility] [address] [phone]  
  
    
 12/19/2017  8:00 AM  
ESTABLISHED PATIENT with Vikash López MD  
[facility] Appt Note: 6 mo Josh peralta Class [address] [facility], [address] [facility] [address] [id number]  
[phone]  
  
   
 [address] [facility], [address] [facility] Upcoming Health Maintenance Date Due Hepatitis C Screening 1960 Pneumococcal 19-64 Highest Risk (1 of 3 - PCV13) 7/7/1980 DTaP/Tdap/Td series (1 - Tdap) 7/7/1982 FOBT Q 1 YEAR AGE 50-75 7/7/2011 EYE EXAM RETINAL OR DILATED Q1 6/15/2014 FOOT EXAM Q1 8/15/2014 HEMOGLOBIN A1C Q6M 1/28/2015 MICROALBUMIN Q1 7/28/2015 LIPID PANEL Q1 7/28/2015 PAP AKA CERVICAL CYTOLOGY 5/15/2016 BREAST CANCER SCRN MAMMOGRAM 5/22/2016 INFLUENZA AGE 9 TO ADULT 8/1/2017 Allergies as of 6/19/2017  Review Complete On: 6/19/2017 By: Vikash López MD  
  
 Severity Noted Reaction Type Reactions Ceftin [Cefuroxime Axetil] Medium 09/30/2010    Hives Current Immunizations  Reviewed on 6/20/2016 Name Date Influenza Vaccine 11/4/2014 Not reviewed this visit You Were Diagnosed With   
  
 Codes Comments Breast cancer of upper-outer quadrant of right female breast ([facility] [address]    -  Primary ICD-10-CM: X66.942 ICD-9-CM: 174.4 Anxiety     ICD-10-CM: F41.9 ICD-9-CM: 300.00 Osteopenia, unspecified location     ICD-10-CM: M85.80 ICD-9-CM: 733.90 Neuropathy     ICD-10-CM: G62.9 ICD-9-CM: 538. 9 Vitamin D deficiency     ICD-10-CM: E55.9 ICD-9-CM: 268.9 Arthralgia, unspecified joint     ICD-10-CM: M25.50 ICD-9-CM: 719.40 Chronic nonintractable headache, unspecified headache type     ICD-10-CM: R51 ICD-9-CM: 784.0 Lactic acid blood increased     ICD-10-CM: R79.89 ICD-9-CM: 276.2 Vitals BP Pulse Temp Height(growth percentile) Weight(growth percentile) SpO2  
 109/60 (BP 1 Location: Left arm, BP Patient Position: Sitting) 92 98.4 °F (36.9 °C) (Oral) 5' 3\" (1.6 m) 206 lb 3.2 oz (93.5 kg) 95% BMI OB Status Smoking Status 36.53 kg/m2 Postmenopausal Former Smoker BMI and BSA Data Body Mass Index Body Surface Area  
 36.53 kg/m 2 2.04 m 2 Preferred Pharmacy Pharmacy Name Phone [id number] - [place] - [id number] AT [address] [facility] [place] [phone] Your Updated Medication List  
  
   
This list is accurate as of: 6/19/17  2:14 PM.  Always use your most recent med list.  
  
  
  
  
 ACCU-CHEK COMPACT TEST Strp Generic drug:  Blood Sugar Diagnostic, Drum ALPRAZolam 0.25 mg tablet Commonly known as:  XANAX  
TAKE 1 TABLET 3 TIMES A DAY AS NEEDED FOR ANXIETY  
  
 BENADRYL 25 mg capsule Generic drug:  diphenhydrAMINE Take 25 mg by mouth nightly as needed. CRESTOR 10 mg tablet Generic drug:  rosuvastatin Take 10 mg by mouth nightly. cyclobenzaprine 5 mg tablet Commonly known as:  FLEXERIL  
TAKE 1-2 TABLETS BY MOUTH EVERY 8 HOURS AS NEEDED FOR PAIN  
  
 FARESTON 60 mg Tab Generic drug:  toremifene TAKE 1 TABLET EVERY DAY  
  
 * insulin CONCENTRATED regular 500 unit/mL Soln Commonly known as:  U-500 CONCENTRATED  
by SubCUTAneous route. * HumuLIN R U-500 (Conc) Kwikpen 500 unit/mL (3 mL) Inpn subQ pen Generic drug:  insulin CONCENTRATED regular Insulin Needles (Disposable) 31 gauge x 5/16\" Ndle Commonly known as:  BD INSULIN PEN NEEDLE UF SHORT  
by Does Not Apply route two (2) times a day. levothyroxine 200 mcg tablet Commonly known as:  SYNTHROID Take 200 mcg by mouth Daily (before breakfast). lidocaine 5 % Commonly known as:  Peggye Hungarian Apply patch to the affected area for 12 hours a day and remove for 12 hours a day. lisinopril-hydroCHLOROthiazide 10-12.5 mg per tablet Commonly known as:  Gerilyn Puff Take  by mouth daily. LYRICA 50 mg capsule Generic drug:  pregabalin TAKE ONE CAPSULE BY MOUTH 3 TIMES A DAY  
  
 metFORMIN 1,000 mg tablet Commonly known as:  GLUCOPHAGE Take 1,000 mg by mouth two (2) times daily (with meals). nitrofurantoin (macrocrystal-monohydrate) 100 mg capsule Commonly known as:  MACROBID  
TAKE ONE CAPSULE BY MOUTH EVERY DAY  
  
 OTHER Breast Prosthesis Mastectomy Bra  Dx: U65.991 PROzac 20 mg capsule Generic drug:  FLUoxetine Take 20 mg by mouth nightly. VICTOZA 2-DAKSHA 0.6 mg/0.1 mL (18 mg/3 mL) sub-q pen Generic drug:  Liraglutide INJECT 1.2 MG ONCE A DAY SUBCUTANEOUSLY * Notice: This list has 2 medication(s) that are the same as other medications prescribed for you. Read the directions carefully, and ask your doctor or other care provider to review them with you. Follow-up Instructions Return for 6-7m tracey peralta. To-Do List   
 12/18/2017 2:00 PM  
  Appointment with Skip Kilgore 1 at [place] [address] ([phone]) [facility]! Dear Dalila Green: Thank you for requesting a [employer] account. Our records indicate that you already have an active [employer] account. You can access your account anytime at [email or web address] Did you know that you can access your hospital and ER discharge instructions at any time in [employer]? You can also review all of your test results from your hospital stay or ER visit. Additional Information If you have questions, please visit the Frequently Asked Questions section of the Pro[employer] website at [email or web address] Remember, Pro[employer] is NOT to be used for urgent needs. For medical emergencies, dial 911. Now available from your iPhone and Android! Please provide this summary of care documentation to your next provider. Your primary care clinician is listed as Olga Lidia Murray. If you have any questions after today's visit, please call [phone].

## 2017-06-19 NOTE — PROGRESS NOTES
[address], [address] [facility]  [address]  W: [phone]  F: [phone]     f/u HEME/ONC CONSULT    Reason for visit: management of     Breast Cancer    Referring physician: Dr. Joann Figueroa physician: Dr. Jordan Garcia, Dr. Shaun Sanchez    HPI: Jayce Matos is a 55 y.o.  female who I am seeing in f/u for management of breast cancer. She states she had a negative mammogram in 11/2013 and then felt a mass in her right breast in late 2/2014. Menarche at 16, first of 2 pregnancies at 26, menopause at 51, no history of HRT. FH + for her mother having breast cancer in her late 46s. No FH of ovarian cancer. Biopsy on 3/6/14 shows invasive mammary carcinoma, 0.7 cm, gr 3, ER + > 90%, MO + > 90%, HER 2 negative (ratio 1.13; sig/nuc 3.7). 3/26/14 SLN bx 4/7, 1.5 cm largest met, + CARLOS. S/p DD AC-T q 2 weeks x 4 for each (60/600/175 mg/m2), started 4/2/14. Completed AC 5/14/14, taxol 7/9/14. S/p bilateral mastectomies on 8/14/14, left breast negative, right breast no residual disease, 0/11 LN involved, pCR, RCB-0, [id number].    10/18/14 expanders removed due to infected left breast expander. XRT 11/17/14-12/24/14    Started letrozole- 1/1/2015-1/20/16 (joint pain)  Anastrozole 2/12/16- 4/6/16  Tamoxifen 4/25/2016-5/8/16   Exemestane 5/24/16-8/4/16 (joint pain)  toremefine 9/28/16-    1/16/15 biopsy of right axilla was negative. Interval history:  In today for follow up. Today complains of: gr 1 constipation, gr 2 fatigue, gr 2 hot flashes, gr 2 insomnia, gr 2 decrease cognition and concentration, gr 1 sob, gr 1 rapid heartbeat, gr 2 headache. She has been feeling very fatigued and would like to see if the toremifene is causing it. DX   Encounter Diagnoses   Name Primary?     Breast cancer of upper-outer quadrant of right female breast ([facility] [address] Yes    Anxiety     Osteopenia, unspecified location     Neuropathy     Vitamin D deficiency     Arthralgia, unspecified joint     Chronic nonintractable headache, unspecified headache type         Past Medical History:   Diagnosis Date    Cancer [facility])     R breast cancer dx 3/2014; chemo    Depression     Diabetes ([facility] [address]     Dyspepsia and other specified disorders of function of stomach     Hypercholesterolemia     MARISA (obstructive sleep apnea)     wears cpap    Other and unspecified hyperlipidemia     Psychiatric disorder     Stroke [facility]) 1980's    ?slight stroke due to oral contraceptives    Thyroid disease     Type II or unspecified type diabetes mellitus without mention of complication, uncontrolled     Unspecified hypothyroidism      Past Surgical History:   Procedure Laterality Date    BREAST SURGERY PROCEDURE UNLISTED  2/26/14    excisionl bx of lymlh nodes    EXCISE HAND/FOOT NEUROMA      bilateral feet    HX BREAST RECONSTRUCTION  8/14/2014    BILATERAL BREAST RECONSTRUCTION WITH TISSUE EXPANDERS AND ALLODERM performed by Ailyn Castro MD at [facility] MAIN OR    HX BREAST RECONSTRUCTION Bilateral 10/18/2014    BREAST TISSUE EXPANDER REMOVAL ( BILATERAL )  performed by Ailyn Castro MD at [facility] MAIN OR    HX CERVICAL FUSION      Full ROM per pt    HX TONSILLECTOMY      HX VASCULAR ACCESS  3/26/14    adrian cath insertion    SHOULDER SURG PROC UNLISTED      right     Social History     Social History    Marital status: SINGLE     Spouse name: N/A    Number of children: N/A    Years of education: N/A     Social History Main Topics    Smoking status: Former Smoker     Packs/day: 1.00     Years: 30.00    Smokeless tobacco: Never Used    Alcohol use No    Drug use: No    Sexual activity: Not Asked     Other Topics Concern    None     Social History Narrative    Lives in [place] with 24 yo son and son's girlfriend. Has a 31 yo daughter.  for many years. Works as a  at an accounting firm. Likes to amado.      Family History   Problem Relation Age of Onset    Diabetes Mother      type 2    Thyroid Disease Mother     Heart Disease Mother     Cancer Mother 61     breast & lung    Hypertension Mother     Diabetes Father      type 1     Diabetes Sister      borderline    Stroke Neg Hx        Current Outpatient Prescriptions   Medication Sig Dispense Refill    lidocaine (LIDODERM) 5 % Apply patch to the affected area for 12 hours a day and remove for 12 hours a day. 15 Each 6    LYRICA 50 mg capsule TAKE ONE CAPSULE BY MOUTH 3 TIMES A DAY 90 Cap 1    ALPRAZolam (XANAX) 0.25 mg tablet TAKE 1 TABLET 3 TIMES A DAY AS NEEDED FOR ANXIETY  1    FARESTON 60 mg tab TAKE 1 TABLET EVERY DAY 30 Tab 5    VICTOZA 2-DAKSHA 0.6 mg/0.1 mL (18 mg/3 mL) sub-q pen INJECT 1.2 MG ONCE A DAY SUBCUTANEOUSLY  3    levothyroxine (SYNTHROID) 200 mcg tablet Take 200 mcg by mouth Daily (before breakfast).  OTHER Breast Prosthesis  Mastectomy Bra    Dx: C50.919 1 Device 1    cyclobenzaprine (FLEXERIL) 5 mg tablet TAKE 1-2 TABLETS BY MOUTH EVERY 8 HOURS AS NEEDED FOR PAIN  1    ACCU-CHEK COMPACT TEST strip   0    HUMULIN R U-500, CONC, KWIKPEN 500 unit/mL (3 mL) inpn   3    FLUoxetine (PROZAC) 20 mg capsule Take 20 mg by mouth nightly.  rosuvastatin (CRESTOR) 10 mg tablet Take 10 mg by mouth nightly.  Insulin Needles, Disposable, (BD INSULIN PEN NEEDLE UF SHORT) 31 X 5/16 \" Ndle by Does Not Apply route two (2) times a day. 1 Package 11    diphenhydrAMINE (BENADRYL) 25 mg capsule Take 25 mg by mouth nightly as needed.  nitrofurantoin, macrocrystal-monohydrate, (MACROBID) 100 mg capsule TAKE ONE CAPSULE BY MOUTH EVERY DAY  2    insulin CONCENTRATED regular (U-500 CONCENTRATED) 500 unit/mL soln by SubCUTAneous route.  metFORMIN (GLUCOPHAGE) 1,000 mg tablet Take 1,000 mg by mouth two (2) times daily (with meals).  lisinopril-hydrochlorothiazide (PRINZIDE, ZESTORETIC) 10-12.5 mg per tablet Take  by mouth daily.        Facility-Administered Medications Ordered in Other Visits   Medication Dose Route Frequency Provider Last Rate Last Dose    zoledronic acid (ZOMETA) 4 mg/100 mL infusion  4 mg IntraVENous ONCE Margaret Jackson, CARLOS           Allergies   Allergen Reactions    Ceftin [Cefuroxime Axetil] Hives       Review of Systems    A comprehensive review of systems was performed and all systems were negative except for HPI and for the symptom report form, reviewed and scanned in.    Objective:  Physical Exam:  Visit Vitals    /60 (BP 1 Location: Left arm, BP Patient Position: Sitting)    Pulse 92    Temp 98.4 °F (36.9 °C) (Oral)    Ht 5' 3\" (1.6 m)    Wt 206 lb 3.2 oz (93.5 kg)    SpO2 95%    BMI 36.53 kg/m2       General:  Alert, cooperative, no distress, appears stated age. Head:  Normocephalic, without obvious abnormality, atraumatic. Eyes:  Conjunctivae/corneas clear. PERRL, EOMs intact. Throat: Lips, mucosa, and tongue normal.    Neck: Supple, symmetrical, trachea midline, no adenopathy, thyroid: no enlargement/tenderness/nodules   Back:   Symmetric, no curvature. ROM normal. No CVA tenderness. Lungs:   Clear to auscultation bilaterally. Chest wall:  No masses, mild tenderness right upper chest   Heart:  Regular rate and rhythm, S1, S2 normal, no murmur, click, rub or gallop. Breast Exam:  S/p bilateral mastectomies. Right breast implant has a hard knot laterally   Abdomen:   Soft, non tender to palpation. Bowel sounds normal. No organomegaly. Extremities: Hand dip joints ttp    Skin: No lesions, blisters or peeling. Lymph nodes: Cervical, supraclavicular, and axillary nodes normal.   Neurologic: CNII-XII intact. Diagnostic Imaging        Narrative   5/14/14 MAMMO  IMPRESSION: Stable mammogram. Next bilateral diagnostic Mammogram is   recommended in November, 2014. The patient was notified of these results. BIRADS 6: Known breast cancer. 3/20/14 CT c/a/p  IMPRESSION:   1. No evidence of metastatic disease.    2. Right breast mass with prominent but not pathologically enlarged right   axillary lymph nodes. 3. Hepatic steatosis. 4. Left renal cyst.       Bone scan:  Negative    3/24/14 dexa  Lumbar spine: L1-4   Bone mineral density (gm/cm2): 0.943   % of peak bone mass: 79   % for age matched controls: 78   T score: -2   Z score: -2.1   Hip: Right femoral neck   Bone mineral density (gm/cm2): 0.772   % of peak bone mass: 74   % for age matched controls: 78   T score: -1.9   Z score: -1.5   IMPRESSION:   This patient is osteopenic using the World Health Organization criteria   10 year probability of major osteoporotic fracture: 6.3%   10 year probability of hip fracture: 1.2%    3/16/15 LLE negative    5/8/15 brain MRI:  Normal    5/27/16 DEXA  Findings:     Femoral Neck:  Right  Bone mineral density (gm/cm2):?  0.831  % of peak bone mass: 80  % for age matched controls:? 80  T-score: -1.5  Z-score: -1.0     Total Hip: right  Bone mineral density (gm/cm2):  0.930  % of peak bone mass:   92  % for age matched controls:  80  T-score:   -0.6  Z-score:  -0.5     Lumbar Spine:  L1-4  Bone mineral density (gm/cm2):  0.984  % of peak bone mass:  83    % for age matched controls:  80  T-score:  -1.7  Z-score:  -1.7        IMPRESSION:     This patient is osteopenic using the World Health Organization criteria  10 year probability of major osteoporotic fracture:  5.8%  10 year probability of hip fracture:  0.4%      Lab Results  Lab Results   Component Value Date/Time    WBC 6.1 05/19/2017 01:22 PM    Hemoglobin (POC) 12.9 12/19/2016 02:46 PM    HGB 13.5 05/19/2017 01:22 PM    Hematocrit (POC) 38 12/19/2016 02:46 PM    HCT 38.7 05/19/2017 01:22 PM    PLATELET 826 31/93/5636 01:22 PM    MCV 86.6 05/19/2017 01:22 PM     Lab Results   Component Value Date/Time    Sodium 143 05/19/2017 01:22 PM    Potassium 4.4 05/19/2017 01:22 PM    Chloride 106 05/19/2017 01:22 PM    CO2 33 05/19/2017 01:22 PM    Anion gap 4 05/19/2017 01:22 PM Glucose 133 05/19/2017 01:22 PM    BUN 10 05/19/2017 01:22 PM    Creatinine 0.82 05/19/2017 01:22 PM    BUN/Creatinine ratio 12 05/19/2017 01:22 PM    GFR est AA >60 05/19/2017 01:22 PM    GFR est non-AA >60 05/19/2017 01:22 PM    Calcium 9.6 05/19/2017 01:22 PM    AST (SGOT) 38 05/19/2017 01:22 PM    Alk. phosphatase 58 05/19/2017 01:22 PM    Protein, total 7.1 05/19/2017 01:22 PM    Albumin 4.1 05/19/2017 01:22 PM    Globulin 3.0 05/19/2017 01:22 PM    A-G Ratio 1.4 05/19/2017 01:22 PM    ALT (SGPT) 49 05/19/2017 01:22 PM     Assessment/Plan:  55 y.o. female with IMC gr 3, ER+, UT +, HER 2 negative. [id number]. 4/7 LN involved. pCR s/p neoadjuvant chemo  PS 0    1. Breast cancer stage: IIIA clinically    Hormonal therapy: administered    Based on her mother's history of breast cancer, it was reasonable to genetic test this patient. BRCA 1/2 negative. She was unable to tolerate letrozole or anastrozole or exemestane due to joint pain. Tamoxifen started on 4/25/16 and stopped on 5/8/16 due to labile mood and swelling. Has been seen in survivorship    Has been seen by Monica Salter RD. Last eye exam: March 2017  Last gyn exam: April 2017    We discussed today that her risk of recurrence if 12% with the toremifene and would double if she were decide to stop endocrine therapy. No evidence of recurrence. Taking toremefine 60 mg daily but feeling poorly and fatigue. She would like to try a 2 week holiday to see if this improves. She will call me in 2 weeks to let me know how she feels. She saw Dr. Rebecca Hedrick in Nov.       2. Depression/anxiety: stable, no SI/HI; on Prozac 30mg. Pt is currently attending breast cancer support group. 3. Osteopenia:  We previously discussed the data from [id number] [facility] [address] 2013, for the meta-analysis for adjuvant bisphosphonate use in breast cancer.   We discussed that in postmenopausal women, it appears that the bisphosphate zolendronic acid, given as in ABCSG 12 at 4 mg IV q 6 months x 3 years, is beneficial in improving bone DFS and OS. We discussed side effects such as ONJ and the need to avoid invasive dental procedures while on these medications, renal damage, and hypocalcemia. Last dose, #6, 6/19/17    4. Neuropathy: ongoing, followed by Dr. Key Hills at [facility] to 50 mg TID. She is diabetic. At this point neuropathy worsening is not due to chemotherapy, she is following with her Endocrinologist.     5. Vit D/B12 def: Vit D3 1000 IU every other day and B12 injections. 6. Joint pain, L hip and bilateral hands: improved with toremifene saw Dr. Mona Park for her trigger fingers, he thinks she has carpal tunnel; wearing brace at night. 7. L axilla skin candida: improved, using nystatin cream    8. Back and hip pain: bone scan negative on 9/27/16. Improved with steroid injection and flexeril. Also recommended acupuncture to help with her muscle tightness. 9. Headaches: intermittent. Brain MRI negative. 11. Bloating/Weight gain: stable weight, endocrine threrapy could be contributing. Will monitor. 12. Right thigh pain: significantly improved. Will monitor. 13. Elevated lactic acid: Endocrinology and PCP following. Has stopped metformin. Will monitor. Follow-up Disposition:  Return for 6-7m tracey peralta.     Bibi Cruz MD

## 2017-06-19 NOTE — PROGRESS NOTES
Patient is here for f/u on breast cancer.     Visit Vitals    /60 (BP 1 Location: Left arm, BP Patient Position: Sitting)    Pulse 92    Temp 98.4 °F (36.9 °C) (Oral)    Ht 5' 3\" (1.6 m)    Wt 206 lb 3.2 oz (93.5 kg)    SpO2 95%    BMI 36.53 kg/m2

## 2017-07-05 ENCOUNTER — TELEPHONE (OUTPATIENT)
Dept: ONCOLOGY | Age: 57
End: 2017-07-05

## 2017-07-05 RX ORDER — PREGABALIN 50 MG/1
CAPSULE ORAL
Qty: 90 CAP | Refills: 1 | Status: SHIPPED | OUTPATIENT
Start: 2017-07-05 | End: 2017-09-14 | Stop reason: SDUPTHER

## 2017-07-05 NOTE — TELEPHONE ENCOUNTER
Called the patient's listed [facility] pharmacy and left a message with two patient identifiers and a prescription for Lyrica 50 mg capsules. Paper prescription was shredded and discarded.

## 2017-09-14 RX ORDER — PREGABALIN 50 MG/1
CAPSULE ORAL
Qty: 90 CAP | Refills: 1 | Status: SHIPPED | OUTPATIENT
Start: 2017-09-14 | End: 2017-10-13 | Stop reason: SDUPTHER

## 2017-09-14 RX ORDER — TOREMIFENE CITRATE 60 MG/1
TABLET ORAL
Qty: 30 TAB | Refills: 5 | Status: SHIPPED | OUTPATIENT
Start: 2017-09-14 | End: 2018-03-19 | Stop reason: SDUPTHER

## 2017-10-13 ENCOUNTER — TELEPHONE (OUTPATIENT)
Dept: ONCOLOGY | Age: 57
End: 2017-10-13

## 2017-10-13 RX ORDER — PREGABALIN 50 MG/1
CAPSULE ORAL
Qty: 90 CAP | Refills: 1 | Status: SHIPPED | OUTPATIENT
Start: 2017-10-13 | End: 2017-11-14 | Stop reason: SDUPTHER

## 2017-10-13 NOTE — TELEPHONE ENCOUNTER
Pt called and stated that she needs a refill on her Lyrica however she has new insurance and it will require a prior authorization but [facility] should have that information of her new insurance.  Call back number [phone]

## 2017-10-13 NOTE — TELEPHONE ENCOUNTER
Spoke to patient and advised her that refill for Lyrica had been sent to pharmacy and that prior authorization was in process. Patient voices understanding and denies any further questions at this time. Per verbal order from ULCÍA Jackson NP, orders received for the following:  Requested Prescriptions     Signed Prescriptions Disp Refills    pregabalin (LYRICA) 50 mg capsule 90 Cap 1     Sig: TAKE ONE CAPSULE 3 TIMES A DAY     Authorizing Provider: Roman Rolle     Ordering User: Aniket Calixto

## 2017-11-15 ENCOUNTER — TELEPHONE (OUTPATIENT)
Dept: ONCOLOGY | Age: 57
End: 2017-11-15

## 2017-11-15 RX ORDER — PREGABALIN 50 MG/1
CAPSULE ORAL
Qty: 90 CAP | Refills: 1 | Status: SHIPPED | OUTPATIENT
Start: 2017-11-15 | End: 2018-01-19 | Stop reason: SDUPTHER

## 2017-11-15 NOTE — TELEPHONE ENCOUNTER
Called the patient's listed [facility] pharmacy and left a message with two patient identifiers and a prescription for Lyrica 50 mg capsules. The paper prescription was shredded and discarded.

## 2017-11-30 ENCOUNTER — PATIENT MESSAGE (OUTPATIENT)
Dept: ONCOLOGY | Age: 57
End: 2017-11-30

## 2017-12-01 DIAGNOSIS — C50.411 MALIGNANT NEOPLASM OF UPPER-OUTER QUADRANT OF RIGHT FEMALE BREAST, UNSPECIFIED ESTROGEN RECEPTOR STATUS (HCC): Primary | ICD-10-CM

## 2017-12-01 NOTE — TELEPHONE ENCOUNTER
12/1/17 at 11:54 am - Called the patient and verified ID x 2. Inquired about the patient's pain and the patient stated that when she takes a deep breath or moves a certain way there is a shooting pain that feels like \"lightning hit my arm\" that originates in the front right part of her chest, her right shoulder, and the areas around there and \"shoots\" all the way down her right arm. The patient also stated that this has happened before and Heriberto Amaral NP stated it may be a nerve issue. The patient also stated that she takes Aleve and Flexeril and that \"calms it down a bit\" but it is more painful when she coughs, sneezes, or takes a deep breath and that this has been occuring off and on for two months but lately it has been happening ten or more times a day and the patient inquired if she should get scans or continue to monitor her pain. Inquired if the patient was experiencing any other symptoms and the patient denied fevers, chills, redness, warmth, dizziness, and shortness of breath. Informed the patient that Dr. Ade Lopez will be made aware of the patient's questions and concerns and this office will call her back with recommendations. The patient verbalized understanding and denied any further questions or concerns. 12/1/17 at 5:17 pm - Called the patient and verified ID x 2. Informed the patient Dr. Ade Lopez recommends a CT of the chest and that an order will be placed and to expect a call from the schedulers. The patient verbalized understanding and denied any further questions or concerns.

## 2017-12-05 ENCOUNTER — TELEPHONE (OUTPATIENT)
Dept: ONCOLOGY | Age: 57
End: 2017-12-05

## 2017-12-05 NOTE — TELEPHONE ENCOUNTER
Patient called and stated that she is supposed to have a CT done but that she was told a referral is needed and that it could be next week before the CT is able to be done. Patient stated that she is in \"sooo much pain\" and wants to know if there is anything that can be done to get this CT moved up.  # [phone]

## 2017-12-05 NOTE — TELEPHONE ENCOUNTER
Pt called complaining of severe chest and back pain that was radiating down her arm. She was on her way to the ED. I advised that that was appropriate and to continue that way.

## 2017-12-18 ENCOUNTER — APPOINTMENT (OUTPATIENT)
Dept: INFUSION THERAPY | Age: 57
End: 2017-12-18

## 2018-01-19 DIAGNOSIS — G62.9 NEUROPATHY: Primary | ICD-10-CM

## 2018-01-19 RX ORDER — PREGABALIN 50 MG/1
CAPSULE ORAL
Qty: 90 CAP | Refills: 1 | Status: SHIPPED | OUTPATIENT
Start: 2018-01-19 | End: 2018-03-23 | Stop reason: SDUPTHER

## 2018-01-19 NOTE — TELEPHONE ENCOUNTER
Called the patient's listed [facility] pharmacy and left a message with two patient identifiers and a prescription for Lyrica 50 mg capsules. The paper prescription was shredded and discarded.

## 2018-03-19 RX ORDER — TOREMIFENE CITRATE 60 MG/1
TABLET ORAL
Qty: 30 TAB | Refills: 5 | Status: SHIPPED | OUTPATIENT
Start: 2018-03-19 | End: 2018-11-01 | Stop reason: SDUPTHER

## 2018-03-20 ENCOUNTER — OFFICE VISIT (OUTPATIENT)
Dept: SURGERY | Age: 58
End: 2018-03-20

## 2018-03-20 VITALS
SYSTOLIC BLOOD PRESSURE: 118 MMHG | OXYGEN SATURATION: 95 % | DIASTOLIC BLOOD PRESSURE: 68 MMHG | HEIGHT: 63 IN | HEART RATE: 98 BPM | BODY MASS INDEX: 35.44 KG/M2 | WEIGHT: 200 LBS

## 2018-03-20 DIAGNOSIS — Z17.0 MALIGNANT NEOPLASM OF UPPER-OUTER QUADRANT OF RIGHT BREAST IN FEMALE, ESTROGEN RECEPTOR POSITIVE (HCC): Primary | ICD-10-CM

## 2018-03-20 DIAGNOSIS — C50.411 MALIGNANT NEOPLASM OF UPPER-OUTER QUADRANT OF RIGHT BREAST IN FEMALE, ESTROGEN RECEPTOR POSITIVE (HCC): Primary | ICD-10-CM

## 2018-03-20 RX ORDER — DEXTROAMPHETAMINE SACCHARATE, AMPHETAMINE ASPARTATE MONOHYDRATE, DEXTROAMPHETAMINE SULFATE AND AMPHETAMINE SULFATE 5; 5; 5; 5 MG/1; MG/1; MG/1; MG/1
15 CAPSULE, EXTENDED RELEASE ORAL DAILY
COMMUNITY
Start: 2017-10-29 | End: 2021-09-24

## 2018-03-20 NOTE — PROGRESS NOTES
HISTORY OF PRESENT ILLNESS  Reddy Manrique is a 65 y. o.white female who comes in for follow up of her breast cancer   Breast Cancer   Associated symptoms include chest pain. Pertinent negatives include no abdominal pain, no headaches and no shortness of breath. Follow-up   Associated symptoms include chest pain. Pertinent negatives include no abdominal pain, no headaches and no shortness of breath. Breast Problem   Associated symptoms include chest pain. Pertinent negatives include no abdominal pain, no headaches and no shortness of breath. Associated symptoms include chest pain. Pertinent negatives include no abdominal pain, no headaches and no shortness of breath. Chest Pain   Associated symptoms include back pain. Pertinent negatives include no abdominal pain, no claudication, no cough, no diaphoresis, no dizziness, no fever, no headaches, no hemoptysis, no malaise/fatigue, no nausea, no orthopnea, no palpitations, no PND, no shortness of breath, no sputum production, no vomiting and no weakness. Associated symptoms include chest pain. Pertinent negatives include no abdominal pain, no headaches and no shortness of breath. She was diagnosed in March 2014 with invasive mammary carcinoma ER/TX positive and her2/carlos unamplified. Breast MRI demonstrated the lesion to be 5 x 7.5 cm and SLNB demonstrated 4/7 positive nodes. She has completed neoadjuvant chemotherapy with Dr Antwan Rivers and repeat MRI 7/15/2014 demonstrated near complete response with a 6 x 6 x 4 mm focus of enhancement near the 9-10 o'clock region middle third. She had menarche at 16, first of two pregnancies at 26, menopause at 51 and did not take HRT. Her mother had breast cancer in her 46s. No family hx of ovarian cancer. She underwent bilateral mastectomy and right ALND with reconstruction and had pCR. She developed infection in her expanders and they were removed Mildred Tan.    She completed radiation and is took letrozole and anastrozole but did not tolerated them. She recently started tamoxifen but she gained 8 pounds in one week and felt terrible and stopped it. She has a nodular area laterally on the right chest which I did an FNA on in Jan 2015 and came back with some lymphocytes and blood but no sign of malignancy. She has pain under her right arm/axillary area. She had a right sided headache in May 2015 and brain MRI was negative. US core bx on 7/31/2015 demonstrated fragments of mature fibroadipose tissue with areas of fat necrosis without atypia or malignancy. Dr Cait Bender tried fatgrafting and modification of her cosmesis in Nov 2015 without success. She still has some pain on the right more than the left. She reports noting a pea sized nodule under the left arm.         Past Medical History:   Diagnosis Date    Cancer [facility])     R breast cancer dx 3/2014; chemo    Depression     Diabetes ([facility] [address]     Dyspepsia and other specified disorders of function of stomach     Hypercholesterolemia     MARISA (obstructive sleep apnea)     wears cpap    Other and unspecified hyperlipidemia     Psychiatric disorder     Stroke [facility]) 1980's    ?slight stroke due to oral contraceptives    Thyroid disease     Type II or unspecified type diabetes mellitus without mention of complication, uncontrolled     Unspecified hypothyroidism      Past Surgical History:   Procedure Laterality Date    BREAST SURGERY PROCEDURE UNLISTED  2/26/14    excisionl bx of lymlh nodes    EXCISE HAND/FOOT NEUROMA      bilateral feet    HX BREAST RECONSTRUCTION  8/14/2014    BILATERAL BREAST RECONSTRUCTION WITH TISSUE EXPANDERS AND ALLODERM performed by [facility] at [facility] MA[facility] OR    HX BREAST RECONSTRUCTION Bilateral 10/18/2014    BREAST TISSUE EXPANDER REMOVAL ( BILATERAL )  performed by [facility]athan [facility] at [facility] MAIN OR    HX CERVICAL FUSION      Full ROM per pt    HX TONSILLECTOMY      HX VASCULAR ACCESS  3/26/14    adrian cath insertion    SHOULDER SURG PROC UNLISTED      right     Family History   Problem Relation Age of Onset    Diabetes Mother      type 2    Thyroid Disease Mother     Heart Disease Mother     Cancer Mother 61     breast & lung    Hypertension Mother     Diabetes Father      type 1     Diabetes Sister      borderline    Stroke Neg Hx      Social History   Substance Use Topics    Smoking status: Former Smoker     Packs/day: 1.00     Years: 30.00    Smokeless tobacco: Never Used    Alcohol use No     Current Outpatient Prescriptions   Medication Sig    amphetamine-dextroamphetamine XR (ADDERALL XR) 20 mg XR capsule Take 20 mg by mouth daily.  FARESTON 60 mg tab TAKE 1 TABLET EVERY DAY    LYRICA 50 mg capsule TAKE ONE CAPSULE BY MOUTH 3 TIMES A DAY    lidocaine (LIDODERM) 5 % Apply patch to the affected area for 12 hours a day and remove for 12 hours a day.  ALPRAZolam (XANAX) 0.25 mg tablet TAKE 1 TABLET 3 TIMES A DAY AS NEEDED FOR ANXIETY    levothyroxine (SYNTHROID) 200 mcg tablet Take 200 mcg by mouth Daily (before breakfast).  OTHER Breast Prosthesis  Mastectomy Bra    Dx: C50.919    cyclobenzaprine (FLEXERIL) 5 mg tablet TAKE 1-2 TABLETS BY MOUTH EVERY 8 HOURS AS NEEDED FOR PAIN    ACCU-CHEK COMPACT TEST strip     HUMULIN R U-500, CONC, KWIKPEN 500 unit/mL (3 mL) inpn     insulin CONCENTRATED regular (U-500 CONCENTRATED) 500 unit/mL soln by SubCUTAneous route.  metFORMIN (GLUCOPHAGE) 1,000 mg tablet Take 1,000 mg by mouth two (2) times daily (with meals).  FLUoxetine (PROZAC) 20 mg capsule Take 20 mg by mouth nightly.  Insulin Needles, Disposable, (BD INSULIN PEN NEEDLE UF SHORT) 31 X 5/16 \" Ndle by Does Not Apply route two (2) times a day.  diphenhydrAMINE (BENADRYL) 25 mg capsule Take 25 mg by mouth nightly as needed.     nitrofurantoin, macrocrystal-monohydrate, (MACROBID) 100 mg capsule TAKE ONE CAPSULE BY MOUTH EVERY DAY    VICTOZA 2-DAKSHA 0.6 mg/0.1 mL (18 mg/3 mL) sub-q pen INJECT 1.2 MG ONCE A DAY SUBCUTANEOUSLY    lisinopril-hydrochlorothiazide (PRINZIDE, ZESTORETIC) 10-12.5 mg per tablet Take  by mouth daily.  rosuvastatin (CRESTOR) 10 mg tablet Take 10 mg by mouth nightly. No current facility-administered medications for this visit. Allergies   Allergen Reactions    Ceftin [Cefuroxime Axetil] Hives       Review of Systems   Constitutional: Negative for chills, diaphoresis, fever, malaise/fatigue and weight loss. HENT: Negative for congestion, ear pain and sore throat. Eyes: Negative for blurred vision and pain. Respiratory: Negative for cough, hemoptysis, sputum production, shortness of breath, wheezing and stridor. Cardiovascular: Positive for chest pain. Negative for palpitations, orthopnea, claudication, leg swelling and PND. Gastrointestinal: Negative for abdominal pain, blood in stool, constipation, diarrhea, heartburn, melena, nausea and vomiting. Genitourinary: Negative for dysuria, flank pain, frequency, hematuria and urgency. Musculoskeletal: Positive for back pain. Negative for joint pain, myalgias and neck pain. Skin: Negative for itching and rash. Neurological: Negative for dizziness, tremors, focal weakness, seizures, weakness and headaches. Reduced cognition. Improved on Adderral   Endo/Heme/Allergies: Negative for polydipsia. Psychiatric/Behavioral: Negative for depression and memory loss. The patient is not nervous/anxious. Visit Vitals    /68 (BP 1 Location: Left arm, BP Patient Position: Sitting)    Pulse 98    Ht 5' 3\" (1.6 m)    Wt 90.7 kg (200 lb)    SpO2 95%    BMI 35.43 kg/m2       Physical Exam   Constitutional: She is oriented to person, place, and time. She appears well-developed and well-nourished. No distress. HENT:   Head: Normocephalic and atraumatic. Mouth/Throat: Oropharynx is clear and moist. No oropharyngeal exudate.    Eyes: Conjunctivae and EOM are normal. Pupils are equal, round, and reactive to light. No scleral icterus. Neck: Normal range of motion. Neck supple. No JVD present. No tracheal deviation present. No thyromegaly present. Cardiovascular: Normal rate and regular rhythm. Exam reveals no gallop and no friction rub. No murmur heard. Pulmonary/Chest: Effort normal and breath sounds normal. No respiratory distress. She has no wheezes. She has no rales. Right breast exhibits skin change (a lot of redundant skin and subcutaneous tissue with ??nodular ridge near axilla). Right breast exhibits no mass (fullness in UOQ but no discernable mass) and no tenderness. Left breast exhibits no mass, no skin change and no tenderness. Breasts are symmetrical.       Abdominal: Soft. Bowel sounds are normal. She exhibits no distension and no mass. There is no tenderness. There is no rebound and no guarding. Musculoskeletal: Normal range of motion. She exhibits no edema. Lymphadenopathy:     She has no cervical adenopathy. She has no axillary adenopathy. Right: No supraclavicular adenopathy present. Left: No supraclavicular adenopathy present. Neurological: She is alert and oriented to person, place, and time. No cranial nerve deficit. Skin: Skin is warm and dry. No rash noted. She is not diaphoretic. No erythema. No pallor. Psychiatric: She has a normal mood and affect. Her behavior is normal. Judgment and thought content normal.                          ASSESSMENT and PLAN  1. Right breast cancer T3N2M0:  Dx 3/2014 JOSE at 5 years and 0 months  She is now on Ferestan  She will discuss alternative therapies with Dr Shauna Clifton at next visit    2. Right axillary/breast nodule is ? ?scar vs recurrence (doubtful)  It is unchanged in 6 months and FNA was negative. 3.  Right axillary/chest wall pain. Persistent. Likely post surgical and post radiation. I doubt this will improve very much  lidoderm patch    4. Lymphedema of RUE. Mild.   Has seen lymphedema clinic and is currently not wearing compression garment  5. Poor cosmesis since implant loss. S/p fat grafting by Dr Cindy Coleman and prostheses prn  6. ?left axillary nodule is likely dermal cyst  7. Chemobrain.   Now on adderal    RTC 6 months    Greg Navarro MD FACS        Med Onc:  Jayme Shell MD  PCP:  Bobby Pritchard MD

## 2018-03-20 NOTE — PROGRESS NOTES
1. Have you been to the ER, urgent care clinic since your last visit? Hospitalized since your last visit? Yes Where: [facility], anxiety attack. 2. Have you seen or consulted any other health care providers outside of the [address] [place] [address] since your last visit? Include any pap smears or colon screening.  Yes Where: [facility]

## 2018-03-20 NOTE — MR AVS SNAPSHOT
[address], [address] [facility], [address] [place] [address] [facility] 
[phone] Patient: Ryan Bello MRN: [id number] ROP:[birth year] Visit Information Date & Time Provider Department Dept. Phone Encounter #  
 3/20/2018  8:00 AM Zaheer Faria MD [facility] [id number] Your Appointments 6/13/2018  8:45 AM  
Follow Up with Mariana Page MD  
[facility] [address] [facility]) Appt Note: 6 mo fu, Frandy; 6 mo fu, Frandy r/s from 12/19/17; 6 mo fu, Elin Serge [address] [place] [address], [address] [place] [id number]  
[phone]  
  
   
 [address] [place] [address], [address] [place] [address]  
  
    
 9/24/2018  8:00 AM  
ESTABLISHED PATIENT with Zaheer Faria MD  
[facility] Dr. Gold Benavidez [facility] ([address] [facility]) Appt Note: 6 mth Br [address] [facility], [address] [facility], [address] [phone]  
[address], [address] [facility], [address] [place] [address] [address] [phone] Upcoming Health Maintenance Date Due Hepatitis C Screening 1960 Pneumococcal 19-64 Highest Risk (1 of 3 - PCV13) 7/7/1980 DTaP/Tdap/Td series (1 - Tdap) 7/7/1982 FOBT Q 1 YEAR AGE 50-75 7/7/2011 EYE EXAM RETINAL OR DILATED Q1 6/15/2014 FOOT EXAM Q1 8/15/2014 HEMOGLOBIN A1C Q6M 1/28/2015 MICROALBUMIN Q1 7/28/2015 LIPID PANEL Q1 7/28/2015 PAP AKA CERVICAL CYTOLOGY 5/15/2016 BREAST CANCER SCRN MAMMOGRAM 5/22/2016 Influenza Age 5 to Adult 8/1/2017 Allergies as of 3/20/2018  Review Complete On: 3/20/2018 By: Zaheer Faria MD  
  
 Severity Noted Reaction Type Reactions Ceftin [Cefuroxime Axetil] Medium 09/30/2010    Hives Current Immunizations  Reviewed on 6/19/2017 Name Date Influenza Vaccine 11/4/2014 Not reviewed this visit Vitals BP Pulse Height(growth percentile) Weight(growth percentile) SpO2 BMI  
 118/68 (BP 1 Location: Left arm, BP Patient Position: Sitting) 98 5' 3\" (1.6 m) 200 lb (90.7 kg) 95% 35.43 kg/m2 OB Status Smoking Status Postmenopausal Former Smoker Vitals History BMI and BSA Data Body Mass Index Body Surface Area  
 35.43 kg/m 2 2.01 m 2 Preferred Pharmacy Pharmacy Name Phone [id number] - [place], [place] - [address] [phone] Your Updated Medication List  
  
   
This list is accurate as of 3/20/18  8:38 AM.  Always use your most recent med list.  
  
  
  
  
 ACCU-CHEK COMPACT TEST Strp Generic drug:  Blood Sugar Diagnostic, Drum ALPRAZolam 0.25 mg tablet Commonly known as:  XANAX  
TAKE 1 TABLET 3 TIMES A DAY AS NEEDED FOR ANXIETY  
  
 BENADRYL 25 mg capsule Generic drug:  diphenhydrAMINE Take 25 mg by mouth nightly as needed. CRESTOR 10 mg tablet Generic drug:  rosuvastatin Take 10 mg by mouth nightly. cyclobenzaprine 5 mg tablet Commonly known as:  FLEXERIL  
TAKE 1-2 TABLETS BY MOUTH EVERY 8 HOURS AS NEEDED FOR PAIN  
  
 FARESTON 60 mg Tab Generic drug:  toremifene TAKE 1 TABLET EVERY DAY  
  
 * insulin CONCENTRATED regular 500 unit/mL Soln Commonly known as:  U-500 CONCENTRATED  
by SubCUTAneous route. * HumuLIN R U-500 (Conc) Kwikpen 500 unit/mL (3 mL) Inpn subQ pen Generic drug:  insulin U-500 CONCENTRATED regular Insulin Needles (Disposable) 31 gauge x 5/16\" Ndle Commonly known as:  BD INSULIN PEN NEEDLE UF SHORT  
by Does Not Apply route two (2) times a day. levothyroxine 200 mcg tablet Commonly known as:  SYNTHROID Take 200 mcg by mouth Daily (before breakfast). lidocaine 5 % Commonly known as:  Talisha Carlton Apply patch to the affected area for 12 hours a day and remove for 12 hours a day. lisinopril-hydroCHLOROthiazide 10-12.5 mg per tablet Commonly known as:  Sonjia Aus Take  by mouth daily. LYRICA 50 mg capsule Generic drug:  pregabalin TAKE ONE CAPSULE BY MOUTH 3 TIMES A DAY  
  
 metFORMIN 1,000 mg tablet Commonly known as:  GLUCOPHAGE Take 1,000 mg by mouth two (2) times daily (with meals). nitrofurantoin (macrocrystal-monohydrate) 100 mg capsule Commonly known as:  MACROBID  
TAKE ONE CAPSULE BY MOUTH EVERY DAY  
  
 OTHER Breast Prosthesis Mastectomy Bra  Dx: H71.967 PROzac 20 mg capsule Generic drug:  FLUoxetine Take 20 mg by mouth nightly. VICTOZA 2-DAKSHA 0.6 mg/0.1 mL (18 mg/3 mL) Pnij Generic drug:  Liraglutide INJECT 1.2 MG ONCE A DAY SUBCUTANEOUSLY * Notice: This list has 2 medication(s) that are the same as other medications prescribed for you. Read the directions carefully, and ask your doctor or other care provider to review them with you. Introducing [facility]! Dear Vasu Rosales: Thank you for requesting a [employer] account. Our records indicate that you already have an active Yout[employer] account. You can access your account anytime at [email or web address] Did you know that you can access your hospital and ER discharge instructions at any time in [employer]? You can also review all of your test results from your hospital stay or ER visit. Additional Information If you have questions, please visit the Frequently Asked Questions section of the Yout[employer] website at [email or web address] Remember, [employer] is NOT to be used for urgent needs. For medical emergencies, dial 911. Now available from your iPhone and Android! Please provide this summary of care documentation to your next provider. Your primary care clinician is listed as Render Muster. If you have any questions after today's visit, please call [phone].

## 2018-03-23 DIAGNOSIS — G62.9 NEUROPATHY: ICD-10-CM

## 2018-03-27 ENCOUNTER — TELEPHONE (OUTPATIENT)
Dept: ONCOLOGY | Age: 58
End: 2018-03-27

## 2018-03-27 RX ORDER — PREGABALIN 50 MG/1
CAPSULE ORAL
Qty: 90 CAP | Refills: 1 | Status: SHIPPED | OUTPATIENT
Start: 2018-03-27 | End: 2018-05-30 | Stop reason: SDUPTHER

## 2018-03-27 NOTE — TELEPHONE ENCOUNTER
Called the patient's listed [facility] pharmacy and left a message with two patient identifiers and a prescription for Lyrica 50 mg capusles. The paper prescription was shredded and discarded.

## 2018-05-30 DIAGNOSIS — G62.9 NEUROPATHY: ICD-10-CM

## 2018-06-04 RX ORDER — PREGABALIN 50 MG/1
CAPSULE ORAL
Qty: 90 CAP | Refills: 1 | Status: SHIPPED | OUTPATIENT
Start: 2018-06-04 | End: 2018-08-01 | Stop reason: SDUPTHER

## 2018-06-13 ENCOUNTER — OFFICE VISIT (OUTPATIENT)
Dept: ONCOLOGY | Age: 58
End: 2018-06-13

## 2018-06-13 VITALS
TEMPERATURE: 97.9 F | BODY MASS INDEX: 35.33 KG/M2 | OXYGEN SATURATION: 95 % | HEART RATE: 95 BPM | SYSTOLIC BLOOD PRESSURE: 129 MMHG | HEIGHT: 63 IN | RESPIRATION RATE: 16 BRPM | WEIGHT: 199.4 LBS | DIASTOLIC BLOOD PRESSURE: 77 MMHG

## 2018-06-13 DIAGNOSIS — Z17.0 MALIGNANT NEOPLASM OF UPPER-OUTER QUADRANT OF RIGHT BREAST IN FEMALE, ESTROGEN RECEPTOR POSITIVE (HCC): Primary | ICD-10-CM

## 2018-06-13 DIAGNOSIS — Z79.4 TYPE 2 DIABETES MELLITUS WITH DIABETIC NEUROPATHY, WITH LONG-TERM CURRENT USE OF INSULIN (HCC): ICD-10-CM

## 2018-06-13 DIAGNOSIS — E66.01 SEVERE OBESITY (BMI 35.0-39.9): ICD-10-CM

## 2018-06-13 DIAGNOSIS — C50.411 MALIGNANT NEOPLASM OF UPPER-OUTER QUADRANT OF RIGHT BREAST IN FEMALE, ESTROGEN RECEPTOR POSITIVE (HCC): Primary | ICD-10-CM

## 2018-06-13 DIAGNOSIS — E11.40 TYPE 2 DIABETES MELLITUS WITH DIABETIC NEUROPATHY, WITH LONG-TERM CURRENT USE OF INSULIN (HCC): ICD-10-CM

## 2018-06-13 NOTE — PROGRESS NOTES
[address], [address] [place] [address]  [address]  W: [phone]  F: [phone]     f/u HEME/ONC CONSULT    Reason for visit: management of     Breast Cancer (6 month follow up)    Referring physician: Dr. Griselda Blight physician: Dr. John Lindo, Dr. Jaci Burciaga    HPI: Elizabeth Mullins is a 65 y.o.  female who I am seeing in f/u for management of breast cancer. She states she had a negative mammogram in 11/2013 and then felt a mass in her right breast in late 2/2014. Menarche at 16, first of 2 pregnancies at 26, menopause at 51, no history of HRT. FH + for her mother having breast cancer in her late 46s. No FH of ovarian cancer. Biopsy on 3/6/14 shows invasive mammary carcinoma, 0.7 cm, gr 3, ER + > 90%, MT + > 90%, HER 2 negative (ratio 1.13; sig/nuc 3.7). 3/26/14 SLN bx 4/7, 1.5 cm largest met, + CARLOS. S/p DD AC-T q 2 weeks x 4 for each (60/600/175 mg/m2), started 4/2/14. Completed AC 5/14/14, taxol 7/9/14. S/p bilateral mastectomies on 8/14/14, left breast negative, right breast no residual disease, 0/11 LN involved, pCR, RCB-0, [id number].    10/18/14 expanders removed due to infected left breast expander. XRT 11/17/14-12/24/14    Started letrozole- 1/1/2015-1/20/16 (joint pain)  Anastrozole 2/12/16- 4/6/16  Tamoxifen 4/25/2016-5/8/16   Exemestane 5/24/16-8/4/16 (joint pain)  toremefine 9/28/16-    1/16/15 biopsy of right axilla was negative. Interval history:  In today for follow up. Today complains of: gr 1 constipation, gr 1 fatigue, gr 2 hot flashes, gr 2 insomnia, gr 2 cognition and concentration, gr 1 anxiety, 4/10 pain in right side, gr 1 sob,     DX   Encounter Diagnoses   Name Primary?     Malignant neoplasm of upper-outer quadrant of right breast in female, estrogen receptor positive ([facility] [address] Yes    Severe obesity (BMI 35.0-39.9) (HCC)     Type 2 diabetes mellitus with diabetic neuropathy, with long-term current use of insulin [facility])         Past Medical History:   Diagnosis Date    Cancer [facility])     R breast cancer dx 3/2014; chemo    Depression     Diabetes ([facility] [address]     Dyspepsia and other specified disorders of function of stomach     Hypercholesterolemia     MARISA (obstructive sleep apnea)     wears cpap    Other and unspecified hyperlipidemia     Psychiatric disorder     Stroke [facility]) 1980's    ?slight stroke due to oral contraceptives    Thyroid disease     Type II or unspecified type diabetes mellitus without mention of complication, uncontrolled     Unspecified hypothyroidism      Past Surgical History:   Procedure Laterality Date    BREAST SURGERY PROCEDURE UNLISTED  2/26/14    excisionl bx of lymlh nodes    EXCISE HAND/FOOT NEUROMA      bilateral feet    HX BREAST RECONSTRUCTION  8/14/2014    BILATERAL BREAST RECONSTRUCTION WITH TISSUE EXPANDERS AND ALLODERM performed by Dale Randolph MD at [facility] MAIN OR    HX BREAST RECONSTRUCTION Bilateral 10/18/2014    BREAST TISSUE EXPANDER REMOVAL ( BILATERAL )  performed by [facility] at [facility] MAIN OR    HX CERVICAL FUSION      Full ROM per pt    HX TONSILLECTOMY      HX VASCULAR ACCESS  3/26/14    adrian cath insertion    SHOULDER SURG PROC UNLISTED      right     Social History     Social History    Marital status: SINGLE     Spouse name: N/A    Number of children: N/A    Years of education: N/A     Social History Main Topics    Smoking status: Former Smoker     Packs/day: 1.00     Years: 30.00    Smokeless tobacco: Never Used    Alcohol use No    Drug use: No    Sexual activity: Not Asked     Other Topics Concern    None     Social History Narrative    Lives in [facility] [place] with 26 yo son and son's girlfriend. Has a 33 yo daughter.  for many years. Works as a  at an accounting firm. Likes to amado.      Family History   Problem Relation Age of Onset    Diabetes Mother      type 2    Thyroid Disease Mother     Heart Disease Mother  Cancer Mother 61     breast & lung    Hypertension Mother     Diabetes Father      type 1     Diabetes Sister      borderline    Stroke Neg Hx        Current Outpatient Prescriptions   Medication Sig Dispense Refill    LYRICA 50 mg capsule TAKE ONE CAPSULE BY MOUTH 3 TIMES A DAY 90 Cap 1    lidocaine (LIDODERM) 5 % APPLY PATCH EXTERNALLY TO AFFECTED AREA FOR 12 HOURS DAILY, THEN REMOVE FOR 12 HOURS 15 Patch 3    amphetamine-dextroamphetamine XR (ADDERALL XR) 20 mg XR capsule Take 20 mg by mouth daily.  FARESTON 60 mg tab TAKE 1 TABLET EVERY DAY 30 Tab 5    ALPRAZolam (XANAX) 0.25 mg tablet TAKE 1 TABLET 3 TIMES A DAY AS NEEDED FOR ANXIETY  1    VICTOZA 2-DAKSHA 0.6 mg/0.1 mL (18 mg/3 mL) sub-q pen INJECT 1.2 MG ONCE A DAY SUBCUTANEOUSLY  3    levothyroxine (SYNTHROID) 200 mcg tablet Take 200 mcg by mouth Daily (before breakfast).  OTHER Breast Prosthesis  Mastectomy Bra    Dx: C50.919 1 Device 1    cyclobenzaprine (FLEXERIL) 5 mg tablet TAKE 1-2 TABLETS BY MOUTH EVERY 8 HOURS AS NEEDED FOR PAIN  1    ACCU-CHEK COMPACT TEST strip   0    HUMULIN R U-500, CONC, KWIKPEN 500 unit/mL (3 mL) inpn   3    insulin CONCENTRATED regular (U-500 CONCENTRATED) 500 unit/mL soln by SubCUTAneous route.  FLUoxetine (PROZAC) 20 mg capsule Take 20 mg by mouth nightly.  rosuvastatin (CRESTOR) 10 mg tablet Take 10 mg by mouth nightly.  Insulin Needles, Disposable, (BD INSULIN PEN NEEDLE UF SHORT) 31 X 5/16 \" Ndle by Does Not Apply route two (2) times a day. 1 Package 11    diphenhydrAMINE (BENADRYL) 25 mg capsule Take 25 mg by mouth nightly as needed.  metFORMIN (GLUCOPHAGE) 1,000 mg tablet Take 1,000 mg by mouth two (2) times daily (with meals).  lisinopril-hydrochlorothiazide (PRINZIDE, ZESTORETIC) 10-12.5 mg per tablet Take  by mouth daily.          Allergies   Allergen Reactions    Ceftin [Cefuroxime Axetil] Hives       Review of Systems    A comprehensive review of systems was performed and all systems were negative except for HPI and for the symptom report form, reviewed and scanned in.    Objective:  Physical Exam:  Visit Vitals    /77 (BP 1 Location: Left arm, BP Patient Position: Sitting)    Pulse 95    Temp 97.9 °F (36.6 °C) (Oral)    Resp 16    Ht 5' 3\" (1.6 m)    Wt 199 lb 6.4 oz (90.4 kg)    SpO2 95%    BMI 35.32 kg/m2       General:  Alert, cooperative, no distress, appears stated age. Head:  Normocephalic, without obvious abnormality, atraumatic. Eyes:  Conjunctivae/corneas clear. PERRL, EOMs intact. Throat: Lips, mucosa, and tongue normal.    Neck: Supple, symmetrical, trachea midline, no adenopathy, thyroid: no enlargement/tenderness/nodules   Back:   Symmetric, no curvature. ROM normal. No CVA tenderness. Lungs:   Clear to auscultation bilaterally. Chest wall:  No masses, mild tenderness right upper chest   Heart:  Regular rate and rhythm, S1, S2 normal, no murmur, click, rub or gallop. Breast Exam:  S/p bilateral mastectomies. Right breast implant has a hard knot laterally   Abdomen:   Soft, non tender to palpation. Bowel sounds normal. No organomegaly. Extremities: Hand dip joints ttp    Skin: No lesions, blisters or peeling. Lymph nodes: Cervical, supraclavicular, and axillary nodes normal.   Neurologic: CNII-XII intact. Diagnostic Imaging        Narrative   5/14/14 MAMMO  IMPRESSION: Stable mammogram. Next bilateral diagnostic Mammogram is   recommended in November, 2014. The patient was notified of these results. BIRADS 6: Known breast cancer. 3/20/14 CT c/a/p  IMPRESSION:   1. No evidence of metastatic disease. 2. Right breast mass with prominent but not pathologically enlarged right   axillary lymph nodes. 3. Hepatic steatosis.    4. Left renal cyst.       Bone scan:  Negative    3/24/14 dexa  Lumbar spine: L1-4   Bone mineral density (gm/cm2): 0.943   % of peak bone mass: 79   % for age matched controls: 78   T score: -2 Z score: -2.1   Hip: Right femoral neck   Bone mineral density (gm/cm2): 0.772   % of peak bone mass: 74   % for age matched controls: 78   T score: -1.9   Z score: -1.5   IMPRESSION:   This patient is osteopenic using the World Health Organization criteria   10 year probability of major osteoporotic fracture: 6.3%   10 year probability of hip fracture: 1.2%    3/16/15 LLE negative    5/8/15 brain MRI:  Normal    5/27/16 DEXA  Findings:     Femoral Neck:  Right  Bone mineral density (gm/cm2):? 0.831  % of peak bone mass: 80  % for age matched controls:? 80  T-score: -1.5  Z-score: -1.0     Total Hip: right  Bone mineral density (gm/cm2):  0.930  % of peak bone mass:   92  % for age matched controls:  80  T-score:   -0.6  Z-score:  -0.5     Lumbar Spine:  L1-4  Bone mineral density (gm/cm2):  0.984  % of peak bone mass:  83    % for age matched controls:  80  T-score:  -1.7  Z-score:  -1.7        IMPRESSION:     This patient is osteopenic using the World Health Organization criteria  10 year probability of major osteoporotic fracture:  5.8%  10 year probability of hip fracture:  0.4%      Lab Results  Lab Results   Component Value Date/Time    WBC 6.1 05/19/2017 01:22 PM    Hemoglobin (POC) 12.9 12/19/2016 02:46 PM    HGB 13.5 05/19/2017 01:22 PM    Hematocrit (POC) 38 12/19/2016 02:46 PM    HCT 38.7 05/19/2017 01:22 PM    PLATELET 179 80/16/3736 01:22 PM    MCV 86.6 05/19/2017 01:22 PM     Lab Results   Component Value Date/Time    Sodium 141 06/19/2017 02:51 PM    Potassium 3.8 06/19/2017 02:51 PM    Chloride 105 06/19/2017 02:51 PM    CO2 26 06/19/2017 02:51 PM    Anion gap 10 06/19/2017 02:51 PM    Glucose 210 (H) 06/19/2017 02:51 PM    BUN 10 06/19/2017 02:51 PM    Creatinine 0.85 06/19/2017 02:51 PM    BUN/Creatinine ratio 12 06/19/2017 02:51 PM    GFR est AA >60 06/19/2017 02:51 PM    GFR est non-AA >60 06/19/2017 02:51 PM    Calcium 8.5 06/19/2017 02:51 PM    AST (SGOT) 38 (H) 05/19/2017 01:22 PM    Alk. phosphatase 58 05/19/2017 01:22 PM    Protein, total 7.1 05/19/2017 01:22 PM    Albumin 3.6 06/19/2017 02:51 PM    Globulin 3.0 05/19/2017 01:22 PM    A-G Ratio 1.4 05/19/2017 01:22 PM    ALT (SGPT) 49 05/19/2017 01:22 PM     Assessment/Plan:  65 y.o. female with IMC gr 3, ER+, [place] +, HER 2 negative. [id number]. 4/7 LN involved. pCR s/p neoadjuvant chemo  PS 0    1. Breast cancer stage: IIIA clinically    Hormonal therapy: administered    Based on her mother's history of breast cancer, it was reasonable to genetic test this patient. BRCA 1/2 negative. She was unable to tolerate letrozole or anastrozole or exemestane due to joint pain. Tamoxifen started on 4/25/16 and stopped on 5/8/16 due to labile mood and swelling. Last eye exam: 6 months ago  Last gyn exam: April 2017, due    No evidence of recurrence. Taking toremefine 60 mg daily. Discussed sending [facility] to see distant recurrence risk -- she would consider taking 10 years of endocrine therapy vs five years. 2. Depression/anxiety: stable, no SI/HI; on Prozac 30mg. Pt is currently attending breast cancer support group. Sees Dr. Deisi Roy at [facility]; on adderall    3. Osteopenia:  We previously discussed the data from [id number] [place] [address], [facility] 2013, for the meta-analysis for adjuvant bisphosphonate use in breast cancer. We discussed that in postmenopausal women, it appears that the bisphosphate zolendronic acid, given as in ABCSG 12 at 4 mg IV q 6 months x 3 years, is beneficial in improving bone DFS and OS. We discussed side effects such as ONJ and the need to avoid invasive dental procedures while on these medications, renal damage, and hypocalcemia. Last dose, #6, 6/19/17    4. Neuropathy: ongoing, followed by Dr. Rebeca Stalwlorth at [facility], Lyrica to 50 mg TID. She is diabetic. At this point neuropathy worsening is not due to chemotherapy, she is following with her Endocrinologist.     5. B12 def: B12 injections.        6. Joint pain, trigger fingers: worsening; seeing Dr. Yoshi Chauhan for her trigger fingers, he thinks she has carpal tunnel; wearing brace at night. Will monitor; may need to build in 2 week holidays on the toremifene    7. Severe obesity:  She is exercising    8. DM2, on insulin:  Insulin and metformin; with neuropathy       Follow-up Disposition:  Return in about 9 months (around 3/13/2019).     Lucie Frankel MD

## 2018-06-13 NOTE — PROGRESS NOTES
Chief Complaint   Patient presents with    Breast Cancer     6 month follow up     1. Have you been to the ER, urgent care clinic since your last visit? Hospitalized since your last visit? YES    2. Have you seen or consulted any other health care providers outside of the [address] [place] [address] since your last visit? Include any pap smears or colon screening.  YES Dr. Louis Luther 5/2018 and Dr. Gordy Galvez 6/2018

## 2018-07-02 ENCOUNTER — TELEPHONE (OUTPATIENT)
Dept: ONCOLOGY | Age: 58
End: 2018-07-02

## 2018-07-02 NOTE — TELEPHONE ENCOUNTER
Patient called and stated that she was supposed to be having some testing done and would like to know if the results are back.  # [phone]

## 2018-07-02 NOTE — TELEPHONE ENCOUNTER
Called the patient and verified ID x 2. The patient stated that at her last office visit Dr. Megan Wayne discussed her percentages of recurrence and he stated that he could order a test on her tumor. Informed the patient that per Dr. Megan Wayne the test allows a maximum of three positive lymph nodes however the patient has four positive lymph nodes and that he was going to research and see if there are any other available tests and that this office will follow up with Dr. Megan Wayne and call her back with an update. The patient verbalized understanding and denied any further questions or concerns.

## 2018-07-06 ENCOUNTER — TELEPHONE (OUTPATIENT)
Dept: ONCOLOGY | Age: 58
End: 2018-07-06

## 2018-07-06 NOTE — TELEPHONE ENCOUNTER
Called the patient and verified ID x 2. Informed the patient that per Dr. Frandy Cervantes the Breast Cancer Index test and tests similar to it do not test on patients with more than three positive lymph nodes because insurance companies will not pay for the test.  The patient inquired why that is and what that means in regards to continuing her therapy to ten years. Informed the patient that Dr. Frandy Cervantes will be made aware of her questions and concerns and this office will call her back with an update. The patient inquired what the test is and who the company is that performs the testing and stated that she will call her insurance company. Informed the patient that the company is [employer] and the test is the Breast Cancer Index. The patient verbalized understanding and stated that she will call her insurance company and will call this office with an update as well and denied any further questions or concerns.

## 2018-07-06 NOTE — TELEPHONE ENCOUNTER
Patient called and stated that she spoke with Jena Herrera last week and wanted to follow up with him about their conversation.  # [phone]

## 2018-07-10 NOTE — TELEPHONE ENCOUNTER
Called the patient and verified ID x 2. Informed the patient that Dr. Diya Arita recommends that the patient see him in the office to discuss her questions and concerns because it would \"come down\" to risk versus tolerability of the medications and that he is able to see her sooner than her already scheduled appointment on 3/12/19. The patient stated that she will still conduct some research and call her insurance company and at this time would prefer to keep her already scheduled appointment since her treatment plan is not going to change at this time. Encouraged the patient to call Dr. Ginette Cardona office at any time if she changes her mind and an appointment will be made for her. The patient verbalized understanding and denied any further questions or concerns.

## 2018-08-01 DIAGNOSIS — G62.9 NEUROPATHY: ICD-10-CM

## 2018-08-01 RX ORDER — PREGABALIN 50 MG/1
CAPSULE ORAL
Qty: 90 CAP | Refills: 5 | Status: SHIPPED | OUTPATIENT
Start: 2018-08-01 | End: 2018-12-07 | Stop reason: SDUPTHER

## 2018-08-27 ENCOUNTER — TELEPHONE (OUTPATIENT)
Dept: SURGERY | Age: 58
End: 2018-08-27

## 2018-08-27 NOTE — TELEPHONE ENCOUNTER
Received fax from [employer] stating patient's Lidoderm patch needs PA. Called and spoke with Daria Hassan at [phone] PA was approved PA# [id number] valid 08/27/2018-02/27/2019. Spoke with Joel Campos at [facility]  he is aware and verbalized understanding.

## 2018-09-24 ENCOUNTER — OFFICE VISIT (OUTPATIENT)
Dept: SURGERY | Age: 58
End: 2018-09-24

## 2018-09-24 VITALS
TEMPERATURE: 98.5 F | DIASTOLIC BLOOD PRESSURE: 71 MMHG | OXYGEN SATURATION: 95 % | HEIGHT: 63 IN | SYSTOLIC BLOOD PRESSURE: 113 MMHG | BODY MASS INDEX: 36.42 KG/M2 | RESPIRATION RATE: 16 BRPM | HEART RATE: 97 BPM | WEIGHT: 205.56 LBS

## 2018-09-24 DIAGNOSIS — I89.0 LYMPHEDEMA OF UPPER EXTREMITY FOLLOWING LYMPHADENECTOMY: ICD-10-CM

## 2018-09-24 DIAGNOSIS — Z17.0 MALIGNANT NEOPLASM OF UPPER-OUTER QUADRANT OF RIGHT BREAST IN FEMALE, ESTROGEN RECEPTOR POSITIVE (HCC): Primary | ICD-10-CM

## 2018-09-24 DIAGNOSIS — E89.89 LYMPHEDEMA OF UPPER EXTREMITY FOLLOWING LYMPHADENECTOMY: ICD-10-CM

## 2018-09-24 DIAGNOSIS — C50.411 MALIGNANT NEOPLASM OF UPPER-OUTER QUADRANT OF RIGHT BREAST IN FEMALE, ESTROGEN RECEPTOR POSITIVE (HCC): Primary | ICD-10-CM

## 2018-09-24 NOTE — PROGRESS NOTES
Chief Complaint   Patient presents with    Breast Cancer     6 month follow up. Patient stated fell on Saturday hit her head and back is sore. She did not go to the ER. 1. Have you been to the ER, urgent care clinic since your last visit? Hospitalized since your last visit? No    2. Have you seen or consulted any other health care providers outside of the [address] [place] [address] since your last visit? Include any pap smears or colon screening.  No

## 2018-09-24 NOTE — PROGRESS NOTES
HISTORY OF PRESENT ILLNESS  Edna Santamaria is a 63 y.o. female who comes in for follow up of her breast cancer   Breast Cancer   Associated symptoms include chest pain. Pertinent negatives include no abdominal pain, no headaches and no shortness of breath. Follow-up   Associated symptoms include chest pain. Pertinent negatives include no abdominal pain, no headaches and no shortness of breath. Breast Problem   Associated symptoms include chest pain. Pertinent negatives include no abdominal pain, no headaches and no shortness of breath. Associated symptoms include chest pain. Pertinent negatives include no abdominal pain, no headaches and no shortness of breath. Chest Pain   Associated symptoms include back pain. Pertinent negatives include no abdominal pain, no claudication, no cough, no diaphoresis, no dizziness, no fever, no headaches, no hemoptysis, no malaise/fatigue, no nausea, no orthopnea, no palpitations, no PND, no shortness of breath, no sputum production, no vomiting and no weakness. Associated symptoms include chest pain. Pertinent negatives include no abdominal pain, no headaches and no shortness of breath. She was diagnosed in March 2014 with invasive mammary carcinoma ER/NM positive and her2/carlos unamplified. Breast MRI demonstrated the lesion to be 5 x 7.5 cm and SLNB demonstrated 4/7 positive nodes. She has completed neoadjuvant chemotherapy with Dr Yojana Hudson and repeat MRI 7/15/2014 demonstrated near complete response with a 6 x 6 x 4 mm focus of enhancement near the 9-10 o'clock region middle third. She had menarche at 16, first of two pregnancies at 26, menopause at 51 and did not take HRT. Her mother had breast cancer in her 46s. No family hx of ovarian cancer. She underwent bilateral mastectomy and right ALND with reconstruction and had pCR. She developed infection in her expanders and they were removed Kourtney Allen.    She completed radiation and is took letrozole and anastrozole but did not tolerated them. She recently started tamoxifen but she gained 8 pounds in one week and felt terrible and stopped it. She has a nodular area laterally on the right chest which I did an FNA on in Jan 2015 and came back with some lymphocytes and blood but no sign of malignancy. She has pain under her right arm/axillary area. She had a right sided headache in May 2015 and brain MRI was negative. US core bx on 7/31/2015 demonstrated fragments of mature fibroadipose tissue with areas of fat necrosis without atypia or malignancy. Dr Delia Galindo tried fatgrafting and modification of her cosmesis in Nov 2015 without success. She still has some pain on the right more than the left. She reports noting a pea sized nodule under the left arm.         Past Medical History:   Diagnosis Date    Cancer [facility])     R breast cancer dx 3/2014; chemo    Depression     Diabetes ([facility] [address]     Dyspepsia and other specified disorders of function of stomach     Hypercholesterolemia     MARISA (obstructive sleep apnea)     wears cpap    Other and unspecified hyperlipidemia     Psychiatric disorder     Stroke [facility]) 1980's    ?slight stroke due to oral contraceptives    Thyroid disease     Type II or unspecified type diabetes mellitus without mention of complication, uncontrolled     Unspecified hypothyroidism      Past Surgical History:   Procedure Laterality Date    BREAST SURGERY PROCEDURE UNLISTED  2/26/14    excisionl bx of lymlh nodes    EXCISE HAND/FOOT NEUROMA      bilateral feet    HX BREAST RECONSTRUCTION  8/14/2014    BILATERAL BREAST RECONSTRUCTION WITH TISSUE EXPANDERS AND ALLODERM performed by Aria Hoffman MD at [facility] MAIN OR    HX BREAST RECONSTRUCTION Bilateral 10/18/2014    BREAST TISSUE EXPANDER REMOVAL ( BILATERAL )  performed by Aria Hoffman MD at [facility] MAIN OR    HX CERVICAL FUSION      Full ROM per pt    HX TONSILLECTOMY      HX VASCULAR ACCESS  3/26/14    adrian cath insertion    SHOULDER SURG PROC UNLISTED      right     Family History   Problem Relation Age of Onset    Diabetes Mother      type 2    Thyroid Disease Mother     Heart Disease Mother     Cancer Mother 61     breast & lung    Hypertension Mother     Diabetes Father      type 1     Diabetes Sister      borderline    Stroke Neg Hx      Social History   Substance Use Topics    Smoking status: Former Smoker     Packs/day: 1.00     Years: 30.00    Smokeless tobacco: Never Used    Alcohol use No     Current Outpatient Prescriptions   Medication Sig    pregabalin (LYRICA) 50 mg capsule TAKE ONE CAPSULE BY MOUTH 3 TIMES A DAY    lidocaine (LIDODERM) 5 % APPLY PATCH EXTERNALLY TO AFFECTED AREA FOR 12 HOURS DAILY, THEN REMOVE FOR 12 HOURS    amphetamine-dextroamphetamine XR (ADDERALL XR) 20 mg XR capsule Take 20 mg by mouth daily.  FARESTON 60 mg tab TAKE 1 TABLET EVERY DAY    ALPRAZolam (XANAX) 0.25 mg tablet TAKE 1 TABLET 3 TIMES A DAY AS NEEDED FOR ANXIETY    VICTOZA 2-DAKSHA 0.6 mg/0.1 mL (18 mg/3 mL) sub-q pen INJECT 1.2 MG ONCE A DAY SUBCUTANEOUSLY    levothyroxine (SYNTHROID) 200 mcg tablet Take 200 mcg by mouth Daily (before breakfast).  OTHER Breast Prosthesis  Mastectomy Bra    Dx: C50.919    ACCU-CHEK COMPACT TEST strip     HUMULIN R U-500, CONC, KWIKPEN 500 unit/mL (3 mL) inpn     insulin CONCENTRATED regular (U-500 CONCENTRATED) 500 unit/mL soln by SubCUTAneous route.  FLUoxetine (PROZAC) 20 mg capsule Take 20 mg by mouth nightly.  rosuvastatin (CRESTOR) 10 mg tablet Take 10 mg by mouth nightly.  Insulin Needles, Disposable, (BD INSULIN PEN NEEDLE UF SHORT) 31 X 5/16 \" Ndle by Does Not Apply route two (2) times a day.  cyclobenzaprine (FLEXERIL) 5 mg tablet TAKE 1-2 TABLETS BY MOUTH EVERY 8 HOURS AS NEEDED FOR PAIN    metFORMIN (GLUCOPHAGE) 1,000 mg tablet Take 1,000 mg by mouth two (2) times daily (with meals).     lisinopril-hydrochlorothiazide (PRINZIDE, ZESTORETIC) 10-12.5 mg per tablet Take  by mouth daily.  diphenhydrAMINE (BENADRYL) 25 mg capsule Take 25 mg by mouth nightly as needed. No current facility-administered medications for this visit. Allergies   Allergen Reactions    Ceftin [Cefuroxime Axetil] Hives       Review of Systems   Constitutional: Negative for chills, diaphoresis, fever, malaise/fatigue and weight loss. HENT: Negative for congestion, ear pain and sore throat. Eyes: Negative for blurred vision and pain. Respiratory: Negative for cough, hemoptysis, sputum production, shortness of breath, wheezing and stridor. Cardiovascular: Positive for chest pain. Negative for palpitations, orthopnea, claudication, leg swelling and PND. Gastrointestinal: Negative for abdominal pain, blood in stool, constipation, diarrhea, heartburn, melena, nausea and vomiting. Genitourinary: Negative for dysuria, flank pain, frequency, hematuria and urgency. Musculoskeletal: Positive for back pain, joint pain and myalgias. Negative for neck pain. Skin: Negative for itching and rash. Neurological: Negative for dizziness, tremors, focal weakness, seizures, weakness and headaches. Reduced cognition. Improved on Adderral   Endo/Heme/Allergies: Negative for polydipsia. Psychiatric/Behavioral: Positive for depression. Negative for memory loss. The patient is nervous/anxious. Visit Vitals    /71 (BP 1 Location: Left arm, BP Patient Position: Sitting)    Pulse 97    Temp 98.5 °F (36.9 °C) (Oral)    Resp 16    Ht 5' 3\" (1.6 m)    Wt 93.2 kg (205 lb 9 oz)    SpO2 95%    BMI 36.41 kg/m2       Physical Exam   Constitutional: She is oriented to person, place, and time. She appears well-developed and well-nourished. No distress. HENT:   Head: Normocephalic and atraumatic. Mouth/Throat: Oropharynx is clear and moist. No oropharyngeal exudate. Eyes: Conjunctivae and EOM are normal. Pupils are equal, round, and reactive to light. No scleral icterus. Neck: Normal range of motion. Neck supple. No JVD present. No tracheal deviation present. No thyromegaly present. Cardiovascular: Normal rate and regular rhythm. Exam reveals no gallop and no friction rub. No murmur heard. Pulmonary/Chest: Effort normal and breath sounds normal. No respiratory distress. She has no wheezes. She has no rales. Right breast exhibits skin change (a lot of redundant skin and subcutaneous tissue with ??nodular ridge near axilla). Right breast exhibits no mass (fullness in UOQ but no discernable mass) and no tenderness. Left breast exhibits no mass, no skin change and no tenderness. Breasts are symmetrical.       Abdominal: Soft. Bowel sounds are normal. She exhibits no distension and no mass. There is no tenderness. There is no rebound and no guarding. Musculoskeletal: Normal range of motion. She exhibits no edema. Lymphadenopathy:     She has no cervical adenopathy. She has no axillary adenopathy. Right: No supraclavicular adenopathy present. Left: No supraclavicular adenopathy present. Neurological: She is alert and oriented to person, place, and time. No cranial nerve deficit. Skin: Skin is warm and dry. No rash noted. She is not diaphoretic. No erythema. No pallor. Psychiatric: She has a normal mood and affect. Her behavior is normal. Judgment and thought content normal.                          ASSESSMENT and PLAN  1. Right breast cancer T3N2M0:  Dx 3/2014 JOSE at 5 years and 6 months  She is now on Ferestan  She will discuss alternative therapies with Dr Riley Pena at next visit    2. Right axillary/breast nodule is ? ?scar vs recurrence (doubtful)  It is unchanged in 12 months and FNA was negative. 3.  Right axillary/chest wall pain. Persistent. Likely post surgical and post radiation. I doubt this will improve very much  lidoderm patch    4. Lymphedema of RUE. Mild.   Has seen lymphedema clinic and is currently not wearing compression garment  5. Poor cosmesis since implant loss. S/p fat grafting by Dr Sabi Summers and prostheses prn  6. ?left axillary nodule is likely dermal cyst  7. Chemobrain. Now on adderal  8. Recent fall 9/22/2018  Hit head. No LOC. A and 0 x 3.    No step offs on exam.  Cautioned about concussions and possible need to go to Zoë Reilly MD if symptoms develop    RTC 1 year    Caren Rausch MD FACS        Med Onc:  Bud Vega MD  PCP:  Zoë Reilly MD

## 2018-09-24 NOTE — MR AVS SNAPSHOT
[address] [address], [address] [place] [address] 
[phone] Patient: Jorge Springer MRN: [id number] LED:5[birth year]02 Visit Information Date & Time Provider Department Dept. Phone Encounter #  
 9/24/2018  8:00 AM Daina Ryder MD [facility] [address] [phone] [id number] Your Appointments 3/12/2019  8:00 AM  
ESTABLISHED PATIENT with Emilio Villarreal MD  
[facility] at [address] [facility]) Appt Note: 9 mo fu, [place] [address], [address] [facility] [address] [phone]  
  
   
 [address] [facility], [address] [facility] Upcoming Health Maintenance Date Due Hepatitis C Screening 1960 Pneumococcal 19-64 Highest Risk (1 of 3 - PCV13) 7/7/1980 DTaP/Tdap/Td series (1 - Tdap) 7/7/1982 FOBT Q 1 YEAR AGE 50-75 7/7/2011 EYE EXAM RETINAL OR DILATED Q1 6/15/2014 FOOT EXAM Q1 8/15/2014 HEMOGLOBIN A1C Q6M 1/28/2015 MICROALBUMIN Q1 7/28/2015 LIPID PANEL Q1 7/28/2015 PAP AKA CERVICAL CYTOLOGY 5/15/2016 BREAST CANCER SCRN MAMMOGRAM 5/22/2016 Influenza Age 5 to Adult 8/1/2018 Allergies as of 9/24/2018  Review Complete On: 9/24/2018 By: Daina Ryder MD  
  
 Severity Noted Reaction Type Reactions Ceftin [Cefuroxime Axetil] Medium 09/30/2010    Hives Current Immunizations  Reviewed on 6/19/2017 Name Date Influenza Vaccine 11/4/2014 Not reviewed this visit You Were Diagnosed With   
  
 Codes Comments Malignant neoplasm of upper-outer quadrant of right breast in female, estrogen receptor positive ([facility] [address]    -  Primary ICD-10-CM: C50.411, Z17.0 ICD-9-CM: 174.4, V86.0 Lymphedema of upper extremity following lymphadenectomy     ICD-10-CM: E89.89, I89.0 ICD-9-CM: 997.99, 323.3 Vitals BP Pulse Temp Resp Height(growth percentile) Weight(growth percentile) 113/71 (BP 1 Location: Left arm, BP Patient Position: Sitting) 97 98.5 °F (36.9 °C) (Oral) 16 5' 3\" (1.6 m) 205 lb 9 oz (93.2 kg) SpO2 BMI OB Status Smoking Status 95% 36.41 kg/m2 Postmenopausal Former Smoker BMI and BSA Data Body Mass Index Body Surface Area  
 36.41 kg/m 2 2.04 m 2 Preferred Pharmacy Pharmacy Name Phone [id number] - [place], [place] - [address] [address] AT [address] [phone] Your Updated Medication List  
  
   
This list is accurate as of 9/24/18  8:47 AM.  Always use your most recent med list.  
  
  
  
  
 ACCU-CHEK COMPACT TEST Strp Generic drug:  Blood Sugar Diagnostic, Drum ADDERALL XR 20 mg XR capsule Generic drug:  amphetamine-dextroamphetamine XR Take 20 mg by mouth daily. ALPRAZolam 0.25 mg tablet Commonly known as:  XANAX  
TAKE 1 TABLET 3 TIMES A DAY AS NEEDED FOR ANXIETY  
  
 BENADRYL 25 mg capsule Generic drug:  diphenhydrAMINE Take 25 mg by mouth nightly as needed. CRESTOR 10 mg tablet Generic drug:  rosuvastatin Take 10 mg by mouth nightly. cyclobenzaprine 5 mg tablet Commonly known as:  FLEXERIL  
TAKE 1-2 TABLETS BY MOUTH EVERY 8 HOURS AS NEEDED FOR PAIN  
  
 FARESTON 60 mg Tab Generic drug:  toremifene TAKE 1 TABLET EVERY DAY  
  
 * insulin CONCENTRATED regular 500 unit/mL Soln Commonly known as:  U-500 CONCENTRATED  
by SubCUTAneous route. * HumuLIN R U-500 (Conc) Kwikpen 500 unit/mL (3 mL) Inpn subQ pen Generic drug:  insulin U-500 CONCENTRATED regular Insulin Needles (Disposable) 31 gauge x 5/16\" Ndle Commonly known as:  BD ULTRA-FINE SHORT PEN NEEDLE  
by Does Not Apply route two (2) times a day. levothyroxine 200 mcg tablet Commonly known as:  SYNTHROID Take 200 mcg by mouth Daily (before breakfast). lidocaine 5 % Commonly known as:  LIDODERM  
APPLY PATCH EXTERNALLY TO AFFECTED AREA FOR 12 HOURS DAILY, THEN REMOVE FOR 12 HOURS  
  
 lisinopril-hydroCHLOROthiazide 10-12.5 mg per tablet Commonly known as:  Floyce Plenty Take  by mouth daily. metFORMIN 1,000 mg tablet Commonly known as:  GLUCOPHAGE Take 1,000 mg by mouth two (2) times daily (with meals). OTHER Breast Prosthesis Mastectomy Bra  Dx: C50.919  
  
 pregabalin 50 mg capsule Commonly known as:  Salma Beverly TAKE ONE CAPSULE BY MOUTH 3 TIMES A DAY PROzac 20 mg capsule Generic drug:  FLUoxetine Take 20 mg by mouth nightly. VICTOZA 2-DAKSHA 0.6 mg/0.1 mL (18 mg/3 mL) Pnij Generic drug:  Liraglutide INJECT 1.2 MG ONCE A DAY SUBCUTANEOUSLY * Notice: This list has 2 medication(s) that are the same as other medications prescribed for you. Read the directions carefully, and ask your doctor or other care provider to review them with you. Introducing [facility]! Dear Bruno Pacheco: Thank you for requesting a [employer] account. Our records indicate that you already have an active [employer] account. You can access your account anytime at [email or web address] Did you know that you can access your hospital and ER discharge instructions at any time in [employer]? You can also review all of your test results from your hospital stay or ER visit. Additional Information If you have questions, please visit the Frequently Asked Questions section of the [employer] website at [email or web address] Remember, [employer] is NOT to be used for urgent needs. For medical emergencies, dial 911. Now available from your iPhone and Android! Please provide this summary of care documentation to your next provider. Your primary care clinician is listed as Todd Shah. If you have any questions after today's visit, please call [phone].

## 2018-11-01 RX ORDER — TOREMIFENE CITRATE 60 MG/1
TABLET ORAL
Qty: 30 TAB | Refills: 5 | Status: SHIPPED | OUTPATIENT
Start: 2018-11-01 | End: 2021-09-24

## 2018-12-06 ENCOUNTER — PATIENT MESSAGE (OUTPATIENT)
Dept: ONCOLOGY | Age: 58
End: 2018-12-06

## 2018-12-07 DIAGNOSIS — G62.9 NEUROPATHY: ICD-10-CM

## 2018-12-07 RX ORDER — PREGABALIN 50 MG/1
CAPSULE ORAL
Qty: 90 CAP | Refills: 2 | Status: SHIPPED | OUTPATIENT
Start: 2018-12-07 | End: 2019-03-20 | Stop reason: SDUPTHER

## 2018-12-07 NOTE — TELEPHONE ENCOUNTER
12/7/18 9:44 AM: Per request of Mateo Paez NP, a prescription for Lyrica 50 mg was called in to [facility] pharmacy. Paper prescription was shredded and discarded.

## 2019-02-05 DIAGNOSIS — Z17.0 MALIGNANT NEOPLASM OF UPPER-OUTER QUADRANT OF RIGHT BREAST IN FEMALE, ESTROGEN RECEPTOR POSITIVE (HCC): Primary | ICD-10-CM

## 2019-02-05 DIAGNOSIS — C50.411 MALIGNANT NEOPLASM OF UPPER-OUTER QUADRANT OF RIGHT BREAST IN FEMALE, ESTROGEN RECEPTOR POSITIVE (HCC): Primary | ICD-10-CM

## 2019-03-12 ENCOUNTER — OFFICE VISIT (OUTPATIENT)
Dept: ONCOLOGY | Age: 59
End: 2019-03-12

## 2019-03-12 VITALS
WEIGHT: 207.2 LBS | OXYGEN SATURATION: 95 % | RESPIRATION RATE: 18 BRPM | DIASTOLIC BLOOD PRESSURE: 69 MMHG | BODY MASS INDEX: 36.71 KG/M2 | HEIGHT: 63 IN | TEMPERATURE: 97.2 F | HEART RATE: 91 BPM | SYSTOLIC BLOOD PRESSURE: 122 MMHG

## 2019-03-12 DIAGNOSIS — Z17.0 MALIGNANT NEOPLASM OF UPPER-OUTER QUADRANT OF RIGHT BREAST IN FEMALE, ESTROGEN RECEPTOR POSITIVE (HCC): Primary | ICD-10-CM

## 2019-03-12 DIAGNOSIS — Z79.4 TYPE 2 DIABETES MELLITUS WITH DIABETIC NEUROPATHY, WITH LONG-TERM CURRENT USE OF INSULIN (HCC): ICD-10-CM

## 2019-03-12 DIAGNOSIS — C50.411 MALIGNANT NEOPLASM OF UPPER-OUTER QUADRANT OF RIGHT BREAST IN FEMALE, ESTROGEN RECEPTOR POSITIVE (HCC): Primary | ICD-10-CM

## 2019-03-12 DIAGNOSIS — E11.40 TYPE 2 DIABETES MELLITUS WITH DIABETIC NEUROPATHY, WITH LONG-TERM CURRENT USE OF INSULIN (HCC): ICD-10-CM

## 2019-03-12 DIAGNOSIS — Z78.0 POSTMENOPAUSAL: ICD-10-CM

## 2019-03-12 NOTE — PROGRESS NOTES
[address], [address] [place] [address]  [facility] [address]  W: [phone]  F: [phone]     f/u HEME/ONC CONSULT    Reason for visit: management of breast cancer    Referring physician: Dr. Reny De Guzman physician: Dr. Raj Alejandre, Dr. Josué Bell    HPI: Dat Siddiqui is a 63 y.o.  female who I am seeing in f/u for management of breast cancer. She states she had a negative mammogram in 11/2013 and then felt a mass in her right breast in late 2/2014. Menarche at 16, first of 2 pregnancies at 26, menopause at 51, no history of HRT. FH + for her mother having breast cancer in her late 46s. No FH of ovarian cancer. Biopsy on 3/6/14 shows invasive mammary carcinoma, 0.7 cm, gr 3, ER + > 90%, DC + > 90%, HER 2 negative (ratio 1.13; sig/nuc 3.7). 3/26/14 SLN bx 4/7, 1.5 cm largest met, + CARLOS. S/p DD AC-T q 2 weeks x 4 for each (60/600/175 mg/m2), started 4/2/14. Completed AC 5/14/14, taxol 7/9/14. S/p bilateral mastectomies on 8/14/14, left breast negative, right breast no residual disease, 0/11 LN involved, pCR, RCB-0, [id number].    10/18/14 expanders removed due to infected left breast expander. XRT 11/17/14-12/24/14    Started letrozole- 1/1/2015-1/20/16 (joint pain)  Anastrozole 2/12/16- 4/6/16  Tamoxifen 4/25/2016-5/8/16   Exemestane 5/24/16-8/4/16 (joint pain)  toremefine 9/28/16-    1/16/15 biopsy of right axilla was negative. Interval history:  In today for follow up. Today complains of: complains of gr 1 constipation, gr 2 hot flashes, gr 2 insomnia, gr 3 loss of cognition and concentration, gr 1 anxiety, gr 2 pain, 4-5/10 right side and arm pain, gr 3 neuropathy    Had surgery on 2 fingers on L hand for trigger finger in 2018    DX   Encounter Diagnoses   Name Primary?     Malignant neoplasm of upper-outer quadrant of right breast in female, estrogen receptor positive ([facility] [address] Yes    Type 2 diabetes mellitus with diabetic neuropathy, with long-term current use of insulin (HCC)     Postmenopausal         Past Medical History:   Diagnosis Date    Cancer [facility])     R breast cancer dx 3/2014; chemo    Depression     Diabetes ([facility] [address]     Dyspepsia and other specified disorders of function of stomach     Hypercholesterolemia     MARISA (obstructive sleep apnea)     wears cpap    Other and unspecified hyperlipidemia     Psychiatric disorder     Stroke [facility]) 1980's    ?slight stroke due to oral contraceptives    Thyroid disease     Type II or unspecified type diabetes mellitus without mention of complication, uncontrolled     Unspecified hypothyroidism      Past Surgical History:   Procedure Laterality Date    BREAST SURGERY PROCEDURE UNLISTED  2/26/14    excisionl bx of lymlh nodes    EXCISE HAND/FOOT NEUROMA      bilateral feet    HX BREAST RECONSTRUCTION  8/14/2014    BILATERAL BREAST RECONSTRUCTION WITH TISSUE EXPANDERS AND ALLODERM performed by Homero Elias MD at [facility] MAIN OR    HX BREAST RECONSTRUCTION Bilateral 10/18/2014    BREAST TISSUE EXPANDER REMOVAL ( BILATERAL )  performed by [facility]    HX CERVICAL FUSION      Full ROM per pt    HX TONSILLECTOMY      HX VASCULAR ACCESS  3/26/14    adrian cath insertion    SHOULDER SURG PROC UNLISTED      right     Social History     Socioeconomic History    Marital status: SINGLE     Spouse name: Not on file    Number of children: Not on file    Years of education: Not on file    Highest education level: Not on file   Tobacco Use    Smoking status: Former Smoker     Packs/day: 1.00     Years: 30.00     Pack years: 30.00    Smokeless tobacco: Never Used   Substance and Sexual Activity    Alcohol use: No    Drug use: No   Social History Narrative    Lives in [place] with 24 yo son and son's girlfriend. Has a 33 yo daughter.  for many years. Works as a  at an accounting firm. Likes to amado.      Family History   Problem Relation Age of Onset    Diabetes Mother         type 2    Thyroid Disease Mother     Heart Disease Mother     Cancer Mother 61        breast & lung    Hypertension Mother     Diabetes Father         type 1     Diabetes Sister         borderline    Stroke Neg Hx        Current Outpatient Medications   Medication Sig Dispense Refill    pregabalin (LYRICA) 50 mg capsule TAKE ONE CAPSULE BY MOUTH 3 TIMES A DAY 90 Cap 2    FARESTON 60 mg tab TAKE 1 TABLET BY MOUTH EVERY DAY 30 Tab 5    amphetamine-dextroamphetamine XR (ADDERALL XR) 20 mg XR capsule Take 15 mg by mouth daily.  VICTOZA 2-DAKSHA 0.6 mg/0.1 mL (18 mg/3 mL) sub-q pen INJECT 1.2 MG ONCE A DAY SUBCUTANEOUSLY  3    levothyroxine (SYNTHROID) 200 mcg tablet Take 200 mcg by mouth Daily (before breakfast).  OTHER Breast Prosthesis  Mastectomy Bra    Dx: C50.919 1 Device 1    ACCU-CHEK COMPACT TEST strip   0    HUMULIN R U-500, CONC, KWIKPEN 500 unit/mL (3 mL) inpn   3    FLUoxetine (PROZAC) 20 mg capsule Take 20 mg by mouth nightly.  rosuvastatin (CRESTOR) 10 mg tablet Take 10 mg by mouth nightly.  Insulin Needles, Disposable, (BD INSULIN PEN NEEDLE UF SHORT) 31 X 5/16 \" Ndle by Does Not Apply route two (2) times a day. 1 Package 11    lidocaine (LIDODERM) 5 % APPLY PATCH EXTERNALLY TO AFFECTED AREA FOR 12 HOURS DAILY, THEN REMOVE FOR 12 HOURS 15 Patch 3    ALPRAZolam (XANAX) 0.25 mg tablet TAKE 1 TABLET 3 TIMES A DAY AS NEEDED FOR ANXIETY  1    insulin CONCENTRATED regular (U-500 CONCENTRATED) 500 unit/mL soln by SubCUTAneous route.  diphenhydrAMINE (BENADRYL) 25 mg capsule Take 25 mg by mouth nightly as needed.          Allergies   Allergen Reactions    Ceftin [Cefuroxime Axetil] Hives       Review of Systems    A comprehensive review of systems was performed and all systems were negative except for HPI and for the symptom report form, reviewed and scanned in.    Objective:  Physical Exam:  Visit Vitals  /69   Pulse 91 Temp 97.2 °F (36.2 °C) (Temporal)   Resp 18   Ht 5' 3\" (1.6 m)   Wt 207 lb 3.2 oz (94 kg)   SpO2 95%   BMI 36.70 kg/m²       General:  Alert, cooperative, no distress, appears stated age. Head:  Normocephalic, without obvious abnormality, atraumatic. Eyes:  Conjunctivae/corneas clear. PERRL, EOMs intact. Throat: Lips, mucosa, and tongue normal.    Neck: Supple, symmetrical, trachea midline, no adenopathy, thyroid: no enlargement/tenderness/nodules   Back:   Symmetric, no curvature. ROM normal. No CVA tenderness. Lungs:   Clear to auscultation bilaterally. Chest wall:  No masses, mild tenderness right upper chest   Heart:  Regular rate and rhythm, S1, S2 normal, no murmur, click, rub or gallop. Breast Exam:  S/p bilateral mastectomies. Abdomen:   Soft, non tender to palpation. Bowel sounds normal. No organomegaly. Extremities: Hand dip joints ttp    Skin: No lesions, blisters or peeling. Lymph nodes: ? L SC node   Neurologic: CNII-XII intact. Diagnostic Imaging        Narrative   5/14/14 MAMMO  IMPRESSION: Stable mammogram. Next bilateral diagnostic Mammogram is   recommended in November, 2014. The patient was notified of these results. BIRADS 6: Known breast cancer. 3/20/14 CT c/a/p  IMPRESSION:   1. No evidence of metastatic disease. 2. Right breast mass with prominent but not pathologically enlarged right   axillary lymph nodes. 3. Hepatic steatosis.    4. Left renal cyst.       Bone scan:  Negative    3/24/14 dexa  Lumbar spine: L1-4   Bone mineral density (gm/cm2): 0.943   % of peak bone mass: 79   % for age matched controls: 78   T score: -2   Z score: -2.1   Hip: Right femoral neck   Bone mineral density (gm/cm2): 0.772   % of peak bone mass: 74   % for age matched controls: 78   T score: -1.9   Z score: -1.5   IMPRESSION:   This patient is osteopenic using the World Health Organization criteria   10 year probability of major osteoporotic fracture: 6.3%   10 year probability of hip fracture: 1.2%    3/16/15 LLE negative    5/8/15 brain MRI:  Normal    5/27/16 DEXA  Findings:     Femoral Neck:  Right  Bone mineral density (gm/cm2):? 0.831  % of peak bone mass: 80  % for age matched controls:? 80  T-score: -1.5  Z-score: -1.0     Total Hip: right  Bone mineral density (gm/cm2):  0.930  % of peak bone mass:   92  % for age matched controls:  80  T-score:   -0.6  Z-score:  -0.5     Lumbar Spine:  L1-4  Bone mineral density (gm/cm2):  0.984  % of peak bone mass:  83    % for age matched controls:  80  T-score:  -1.7  Z-score:  -1.7        IMPRESSION:     This patient is osteopenic using the World Health Organization criteria  10 year probability of major osteoporotic fracture:  5.8%  10 year probability of hip fracture:  0.4%      Lab Results  Lab Results   Component Value Date/Time    WBC 6.1 05/19/2017 01:22 PM    Hemoglobin (POC) 12.9 12/19/2016 02:46 PM    HGB 13.5 05/19/2017 01:22 PM    Hematocrit (POC) 38 12/19/2016 02:46 PM    HCT 38.7 05/19/2017 01:22 PM    PLATELET 589 14/93/7155 01:22 PM    MCV 86.6 05/19/2017 01:22 PM     Lab Results   Component Value Date/Time    Sodium 141 06/19/2017 02:51 PM    Potassium 3.8 06/19/2017 02:51 PM    Chloride 105 06/19/2017 02:51 PM    CO2 26 06/19/2017 02:51 PM    Anion gap 10 06/19/2017 02:51 PM    Glucose 210 (H) 06/19/2017 02:51 PM    BUN 10 06/19/2017 02:51 PM    Creatinine 0.85 06/19/2017 02:51 PM    BUN/Creatinine ratio 12 06/19/2017 02:51 PM    GFR est AA >60 06/19/2017 02:51 PM    GFR est non-AA >60 06/19/2017 02:51 PM    Calcium 8.5 06/19/2017 02:51 PM    AST (SGOT) 38 (H) 05/19/2017 01:22 PM    Alk. phosphatase 58 05/19/2017 01:22 PM    Protein, total 7.1 05/19/2017 01:22 PM    Albumin 3.6 06/19/2017 02:51 PM    Globulin 3.0 05/19/2017 01:22 PM    A-G Ratio 1.4 05/19/2017 01:22 PM    ALT (SGPT) 49 05/19/2017 01:22 PM     Assessment/Plan:  63 y.o. female with IMC gr 3, ER+, MO +, HER 2 negative. [id number]. 4/7 LN involved. pCR s/p neoadjuvant chemo  PS 0    1. Breast cancer stage: IIIA clinically    Hormonal therapy: administered    Based on her mother's history of breast cancer, it was reasonable to genetic test this patient. BRCA 1/2 negative. She was unable to tolerate letrozole or anastrozole or exemestane due to joint pain. Tamoxifen started on 4/25/16 and stopped on 5/8/16 due to labile mood and swelling. Last eye exam: 6 months ago  Last gyn exam: April 2017, due    No evidence of recurrence. Taking toremefine 60 mg daily. Recommend 10 years of endocrine therapy since she was LN +. She takes asa daily, not eligible for the asa study. 2. Depression/anxiety: stable, no SI/HI; on Prozac 30mg. Pt is currently attending breast cancer support group. Sees Dr. Stephen Reilly at [address] [place] [address]; on adderall    3. Osteopenia:  We previously discussed the data from [address] [place] [address], [facility] 2013, for the meta-analysis for adjuvant bisphosphonate use in breast cancer. We discussed that in postmenopausal women, it appears that the bisphosphate zolendronic acid, given as in ABCSG 12 at 4 mg IV q 6 months x 3 years, is beneficial in improving bone DFS and OS. We discussed side effects such as ONJ and the need to avoid invasive dental procedures while on these medications, renal damage, and hypocalcemia. Last dose, #6, 6/19/17    4. Neuropathy: ongoing, followed by Dr. Rajinder Choi at [address] [place] [address] mg T[facility]. She is diabetic. At this point neuropathy worsening is not due to chemotherapy, she is following with her Endocrinologist. Also, has carpel tunnel, advise to wear splints    5. B12 def: B12 injections. 6. Joint pain, trigger fingers: s/p surgery with Dr. Wilmer Rodriguez on L hand and inj on R hand. improved    7. Severe obesity:  She is exercising; has gained 8 lbs    8. DM2, on insulin:  Insulin; with neuropathy    9. ? L SC node:   Will get US     > 40 minutes were spent with this patient with > 50% of that time spent in face to face counseling. Follow-up Disposition:  Return in about 6 months (around 9/12/2019).     María Santana MD

## 2019-03-15 DIAGNOSIS — Z17.0 MALIGNANT NEOPLASM OF UPPER-OUTER QUADRANT OF RIGHT BREAST IN FEMALE, ESTROGEN RECEPTOR POSITIVE (HCC): Primary | ICD-10-CM

## 2019-03-15 DIAGNOSIS — C50.411 MALIGNANT NEOPLASM OF UPPER-OUTER QUADRANT OF RIGHT BREAST IN FEMALE, ESTROGEN RECEPTOR POSITIVE (HCC): Primary | ICD-10-CM

## 2019-03-17 ENCOUNTER — HOSPITAL ENCOUNTER (OUTPATIENT)
Dept: ULTRASOUND IMAGING | Age: 59
Discharge: HOME | End: 2019-03-17
Attending: NURSE PRACTITIONER
Payer: COMMERCIAL

## 2019-03-17 DIAGNOSIS — Z17.0 MALIGNANT NEOPLASM OF UPPER-OUTER QUADRANT OF RIGHT BREAST IN FEMALE, ESTROGEN RECEPTOR POSITIVE (HCC): ICD-10-CM

## 2019-03-17 DIAGNOSIS — C50.411 MALIGNANT NEOPLASM OF UPPER-OUTER QUADRANT OF RIGHT BREAST IN FEMALE, ESTROGEN RECEPTOR POSITIVE (HCC): ICD-10-CM

## 2019-03-17 PROCEDURE — 76604 US EXAM CHEST: CPT

## 2019-04-08 ENCOUNTER — HOSPITAL ENCOUNTER (OUTPATIENT)
Dept: MAMMOGRAPHY | Age: 59
Discharge: HOME | End: 2019-04-08
Attending: INTERNAL MEDICINE
Payer: COMMERCIAL

## 2019-04-08 DIAGNOSIS — Z78.0 POSTMENOPAUSAL: ICD-10-CM

## 2019-04-08 PROCEDURE — 77080 DXA BONE DENSITY AXIAL: CPT

## 2019-06-13 RX ORDER — CHLORPHENIRAMINE MALEATE 4 MG
TABLET ORAL 2 TIMES DAILY
Qty: 15 G | Refills: 0 | Status: SHIPPED | OUTPATIENT
Start: 2019-06-13 | End: 2021-09-24

## 2019-09-24 ENCOUNTER — OFFICE VISIT (OUTPATIENT)
Dept: SURGERY | Age: 59
End: 2019-09-24

## 2019-09-24 VITALS
HEIGHT: 63 IN | TEMPERATURE: 97.5 F | SYSTOLIC BLOOD PRESSURE: 119 MMHG | DIASTOLIC BLOOD PRESSURE: 70 MMHG | RESPIRATION RATE: 16 BRPM | WEIGHT: 209 LBS | HEART RATE: 94 BPM | BODY MASS INDEX: 37.03 KG/M2 | OXYGEN SATURATION: 95 %

## 2019-09-24 DIAGNOSIS — Z17.0 MALIGNANT NEOPLASM OF UPPER-OUTER QUADRANT OF RIGHT BREAST IN FEMALE, ESTROGEN RECEPTOR POSITIVE (HCC): Primary | ICD-10-CM

## 2019-09-24 DIAGNOSIS — G62.9 NEUROPATHY: ICD-10-CM

## 2019-09-24 DIAGNOSIS — C50.411 MALIGNANT NEOPLASM OF UPPER-OUTER QUADRANT OF RIGHT BREAST IN FEMALE, ESTROGEN RECEPTOR POSITIVE (HCC): Primary | ICD-10-CM

## 2019-09-24 NOTE — PROGRESS NOTES
Chief Complaint   Patient presents with    Breast Cancer     1 year check up. 1. Have you been to the ER, urgent care clinic since your last visit? Hospitalized since your last visit? No    2. Have you seen or consulted any other health care providers outside of the [address] [place] [address] since your last visit? Include any pap smears or colon screening.  No

## 2019-09-24 NOTE — PROGRESS NOTES
HISTORY OF PRESENT ILLNESS  Wilfredo Ornelas is a 63 y.o. female who comes in for follow up of her breast cancer   Breast Cancer   Associated symptoms include chest pain. Pertinent negatives include no abdominal pain, no headaches and no shortness of breath. Follow-up   Associated symptoms include chest pain. Pertinent negatives include no abdominal pain, no headaches and no shortness of breath. Breast Problem   Associated symptoms include chest pain. Pertinent negatives include no abdominal pain, no headaches and no shortness of breath. Procedure   Associated symptoms include chest pain. Pertinent negatives include no abdominal pain, no headaches and no shortness of breath. Chest Pain (Angina)    Associated symptoms include back pain. Pertinent negatives include no abdominal pain, no claudication, no cough, no diaphoresis, no dizziness, no fever, no headaches, no hemoptysis, no malaise/fatigue, no nausea, no orthopnea, no palpitations, no PND, no shortness of breath, no sputum production, no vomiting and no weakness. Surgical Follow-up   Associated symptoms include chest pain. Pertinent negatives include no abdominal pain, no headaches and no shortness of breath. She was diagnosed in March 2014 with invasive mammary carcinoma ER/IA positive and her2/carlos unamplified. Breast MRI demonstrated the lesion to be 5 x 7.5 cm and SLNB demonstrated 4/7 positive nodes. She has completed neoadjuvant chemotherapy with Dr Merced Diaz and repeat MRI 7/15/2014 demonstrated near complete response with a 6 x 6 x 4 mm focus of enhancement near the 9-10 o'clock region middle third. She had menarche at 16, first of two pregnancies at 26, menopause at 51 and did not take HRT. Her mother had breast cancer in her 46s. No family hx of ovarian cancer. She underwent bilateral mastectomy and right ALND with reconstruction and had pCR. She developed infection in her expanders and they were removed Linette Just).    She completed radiation and is took letrozole and anastrozole but did not tolerated them. She recently started tamoxifen but she gained 8 pounds in one week and felt terrible and stopped it. She has a nodular area laterally on the right chest which I did an FNA on in Jan 2015 and came back with some lymphocytes and blood but no sign of malignancy. She has pain under her right arm/axillary area. She had a right sided headache in May 2015 and brain MRI was negative. US core bx on 7/31/2015 demonstrated fragments of mature fibroadipose tissue with areas of fat necrosis without atypia or malignancy. Dr Hailee Kay tried fatgrafting and modification of her cosmesis in Nov 2015 without success. She still has some pain on the right more than the left. She reports noting a pea sized nodule under the left arm.         Past Medical History:   Diagnosis Date    Cancer [facility])     R breast cancer dx 3/2014; chemo    Depression     Diabetes ([facility] [address]     Dyspepsia and other specified disorders of function of stomach     Hypercholesterolemia     MARISA (obstructive sleep apnea)     wears cpap    Other and unspecified hyperlipidemia     Psychiatric disorder     Stroke [facility]) 1980's    ?slight stroke due to oral contraceptives    Thyroid disease     Type II or unspecified type diabetes mellitus without mention of complication, uncontrolled     Unspecified hypothyroidism      Past Surgical History:   Procedure Laterality Date    BREAST SURGERY PROCEDURE UNLISTED  2/26/14    excisionl bx of lymlh nodes    EXCISE HAND/FOOT NEUROMA      bilateral feet    HX BREAST RECONSTRUCTION  8/14/2014    BILATERAL BREAST RECONSTRUCTION WITH TISSUE EXPANDERS AND ALLODERM performed by [facility] Leg[facility] at [facility] MAIN OR    HX BREAST RECONSTRUCTION Bilateral 10/18/2014    BREAST TISSUE EXPANDER REMOVAL ( BILATERAL )  performed by [facility] MD at [facility] MAIN OR    HX CERVICAL FUSION      Full ROM per pt    HX TONSILLECTOMY      HX VASCULAR ACCESS 3/26/14    ardian cath insertion    SHOULDER SURG PROC UNLISTED      right     Family History   Problem Relation Age of Onset    Diabetes Mother         type 2    Thyroid Disease Mother     Heart Disease Mother     Cancer Mother 61        breast & lung    Hypertension Mother     Diabetes Father         type 1     Diabetes Sister         borderline    Stroke Neg Hx      Social History     Tobacco Use    Smoking status: Former Smoker     Packs/day: 1.00     Years: 30.00     Pack years: 30.00    Smokeless tobacco: Never Used   Substance Use Topics    Alcohol use: No    Drug use: No     Current Outpatient Medications   Medication Sig    pregabalin (LYRICA) 50 mg capsule TAKE ONE CAPSULE BY MOUTH 3 TIMES A DAY    FARESTON 60 mg tab TAKE 1 TABLET BY MOUTH EVERY DAY    lidocaine (LIDODERM) 5 % APPLY PATCH EXTERNALLY TO AFFECTED AREA FOR 12 HOURS DAILY, THEN REMOVE FOR 12 HOURS    amphetamine-dextroamphetamine XR (ADDERALL XR) 20 mg XR capsule Take 15 mg by mouth daily.  ALPRAZolam (XANAX) 0.25 mg tablet TAKE 1 TABLET 3 TIMES A DAY AS NEEDED FOR ANXIETY    VICTOZA 2-DAKSHA 0.6 mg/0.1 mL (18 mg/3 mL) sub-q pen INJECT 1.2 MG ONCE A DAY SUBCUTANEOUSLY    levothyroxine (SYNTHROID) 200 mcg tablet Take 200 mcg by mouth Daily (before breakfast).  OTHER Breast Prosthesis  Mastectomy Bra    Dx: C50.919    ACCU-CHEK COMPACT TEST strip     HUMULIN R U-500, CONC, KWIKPEN 500 unit/mL (3 mL) inpn     FLUoxetine (PROZAC) 20 mg capsule Take 20 mg by mouth nightly.  rosuvastatin (CRESTOR) 10 mg tablet Take 10 mg by mouth nightly.  Insulin Needles, Disposable, (BD INSULIN PEN NEEDLE UF SHORT) 31 X 5/16 \" Ndle by Does Not Apply route two (2) times a day.  diphenhydrAMINE (BENADRYL) 25 mg capsule Take 25 mg by mouth nightly as needed.  clotrimazole (LOTRIMIN) 1 % topical cream Apply  to affected area two (2) times a day.     insulin CONCENTRATED regular (U-500 CONCENTRATED) 500 unit/mL soln by SubCUTAneous route. No current facility-administered medications for this visit. Allergies   Allergen Reactions    Ceftin [Cefuroxime Axetil] Hives       Review of Systems   Constitutional: Negative for chills, diaphoresis, fever, malaise/fatigue and weight loss. HENT: Negative for congestion, ear pain and sore throat. Eyes: Negative for blurred vision and pain. Respiratory: Negative for cough, hemoptysis, sputum production, shortness of breath, wheezing and stridor. Cardiovascular: Positive for chest pain. Negative for palpitations, orthopnea, claudication, leg swelling and PND. Gastrointestinal: Negative for abdominal pain, blood in stool, constipation, diarrhea, heartburn, melena, nausea and vomiting. Genitourinary: Negative for dysuria, flank pain, frequency, hematuria and urgency. Musculoskeletal: Positive for back pain, joint pain and myalgias. Negative for neck pain. Skin: Negative for itching and rash. Neurological: Negative for dizziness, tremors, focal weakness, seizures, weakness and headaches. Reduced cognition. Improved on Adderral   Endo/Heme/Allergies: Negative for polydipsia. Psychiatric/Behavioral: Positive for depression. Negative for memory loss. The patient is nervous/anxious. Visit Vitals  /70 (BP 1 Location: Right arm, BP Patient Position: Sitting)   Pulse 94   Temp 97.5 °F (36.4 °C) (Oral)   Resp 16   Ht 5' 3\" (1.6 m)   Wt 209 lb (94.8 kg)   SpO2 95%   BMI 37.02 kg/m²       Physical Exam   Constitutional: She is oriented to person, place, and time. She appears well-developed and well-nourished. No distress. HENT:   Head: Normocephalic and atraumatic. Mouth/Throat: Oropharynx is clear and moist. No oropharyngeal exudate. Eyes: Pupils are equal, round, and reactive to light. Conjunctivae and EOM are normal. No scleral icterus. Neck: Normal range of motion. Neck supple. No JVD present. No tracheal deviation present.  No thyromegaly present. Cardiovascular: Normal rate and regular rhythm. Exam reveals no gallop and no friction rub. No murmur heard. Pulmonary/Chest: Effort normal and breath sounds normal. No respiratory distress. She has no wheezes. She has no rales. Right breast exhibits skin change (a lot of redundant skin and subcutaneous tissue with ??nodular ridge near axilla). Right breast exhibits no mass (fullness in UOQ but no discernable mass and nodularity consistent with fat necrosis) and no tenderness. Left breast exhibits no mass, no skin change and no tenderness. Breasts are symmetrical.       Abdominal: Soft. Bowel sounds are normal. She exhibits no distension and no mass. There is no tenderness. There is no rebound and no guarding. Musculoskeletal: Normal range of motion. She exhibits no edema. Lymphadenopathy:     She has no cervical adenopathy. She has no axillary adenopathy. Right: No supraclavicular adenopathy present. Left: No supraclavicular adenopathy present. Neurological: She is alert and oriented to person, place, and time. No cranial nerve deficit. Skin: Skin is warm and dry. No rash noted. She is not diaphoretic. No erythema. No pallor. Psychiatric: She has a normal mood and affect. Her behavior is normal. Judgment and thought content normal.                          ASSESSMENT and PLAN  1. Right breast cancer [id number]:  Dx 3/2014 JOSE at 6 years and 6 months  She is now on Ferestan  She will discuss alternative therapies with Dr Shanice Ramesh at next visit    2. Right axillary/breast nodule is ? ?scar vs recurrence (doubtful)  It is unchanged in 12 months and FNA was negative. 3.  Right axillary/chest wall pain. Persistent. Likely post surgical and post radiation. I doubt this will improve very much  lidoderm patch    4. Lymphedema of RUE. Mild. Has seen lymphedema clinic and is currently not wearing compression garment  5. Poor cosmesis since implant loss.  S/p fat grafting by  N Blanchet  RX bras and prostheses prn  6. ?left axillary nodule is likely dermal cyst  7. Chemobrain.   Now on adderal      RTC 1 year    Xaviertad Dunn MD FACS        Med Onc:  Lucius Perez MD  PCP:  Iman Faria MD

## 2019-09-26 ENCOUNTER — TELEPHONE (OUTPATIENT)
Dept: ONCOLOGY | Age: 59
End: 2019-09-26

## 2019-09-26 RX ORDER — PREGABALIN 50 MG/1
CAPSULE ORAL
Qty: 90 CAP | Refills: 1 | Status: SHIPPED | OUTPATIENT
Start: 2019-09-26 | End: 2019-11-24 | Stop reason: SDUPTHER

## 2019-09-26 NOTE — TELEPHONE ENCOUNTER
9/26/19 9:54 AM: Per request of Dalia Ash NP, called in prescription for pregabalin 50 mg to patient's preferred [facility] pharmacy. Paper prescription was shredded and discarded.

## 2019-11-24 DIAGNOSIS — G62.9 NEUROPATHY: ICD-10-CM

## 2019-11-26 RX ORDER — PREGABALIN 50 MG/1
CAPSULE ORAL
Qty: 90 CAP | Refills: 1 | Status: SHIPPED | OUTPATIENT
Start: 2019-11-26 | End: 2020-01-22

## 2019-12-23 ENCOUNTER — TELEPHONE (OUTPATIENT)
Dept: ONCOLOGY | Age: 59
End: 2019-12-23

## 2019-12-23 NOTE — TELEPHONE ENCOUNTER
Patient wants to let Dr know of an upcoming Surgery    And has questions about the medications she is on    Thanks

## 2019-12-23 NOTE — TELEPHONE ENCOUNTER
12/23/19 1:24 PM: Returned call to patient, who stated that on 1/3/20, she is having double bypass surgery and wanted to know if she should continue taking toremifene while she is recovering from the surgery. Patient also wanted to let Dr. Kate Parson know that she canceled her follow up appointment for January but will reschedule once she has fully recovered from surgery. Advised patient that Dr. Kate Parson and Adriana Adkins NP, will be consulted and the patient will receive a call back. 12/23/19 5:15 PM: Called patient and advised that per Dr. Kate Parson, she should stop toremifene during her hospitalization and can restart it once she is back home and feeling better. Patient verbalized understanding and stated she will call back to reschedule her office appointment once she is feeling better. Denied further questions or concerns at this time.

## 2020-02-28 DIAGNOSIS — G62.9 NEUROPATHY: ICD-10-CM

## 2020-02-28 RX ORDER — PREGABALIN 50 MG/1
CAPSULE ORAL
Qty: 90 CAP | Refills: 1 | Status: SHIPPED | OUTPATIENT
Start: 2020-02-28 | End: 2020-05-04

## 2020-02-28 RX ORDER — PREGABALIN 50 MG/1
CAPSULE ORAL
Qty: 90 CAP | Refills: 1 | Status: SHIPPED | OUTPATIENT
Start: 2020-02-28 | End: 2020-02-28 | Stop reason: SDUPTHER

## 2020-04-20 ENCOUNTER — VIRTUAL VISIT (OUTPATIENT)
Dept: ONCOLOGY | Age: 60
End: 2020-04-20

## 2020-04-20 DIAGNOSIS — Z17.0 MALIGNANT NEOPLASM OF UPPER-OUTER QUADRANT OF RIGHT BREAST IN FEMALE, ESTROGEN RECEPTOR POSITIVE (HCC): Primary | ICD-10-CM

## 2020-04-20 DIAGNOSIS — C50.411 MALIGNANT NEOPLASM OF UPPER-OUTER QUADRANT OF RIGHT BREAST IN FEMALE, ESTROGEN RECEPTOR POSITIVE (HCC): Primary | ICD-10-CM

## 2020-04-20 DIAGNOSIS — E11.40 TYPE 2 DIABETES MELLITUS WITH DIABETIC NEUROPATHY, WITH LONG-TERM CURRENT USE OF INSULIN (HCC): ICD-10-CM

## 2020-04-20 DIAGNOSIS — I25.10 CORONARY ARTERY DISEASE INVOLVING NATIVE HEART WITHOUT ANGINA PECTORIS, UNSPECIFIED VESSEL OR LESION TYPE: ICD-10-CM

## 2020-04-20 DIAGNOSIS — G62.9 NEUROPATHY: ICD-10-CM

## 2020-04-20 DIAGNOSIS — Z79.4 TYPE 2 DIABETES MELLITUS WITH DIABETIC NEUROPATHY, WITH LONG-TERM CURRENT USE OF INSULIN (HCC): ICD-10-CM

## 2020-04-20 RX ORDER — METOPROLOL SUCCINATE 50 MG/1
TABLET, EXTENDED RELEASE ORAL DAILY
COMMUNITY
End: 2021-09-24

## 2020-04-20 RX ORDER — ASPIRIN 81 MG/1
TABLET ORAL DAILY
COMMUNITY

## 2020-04-20 NOTE — PROGRESS NOTES
[address]  [address] [place] [address], [address] [facility] [place]  [address], [address]  W: [phone]  F: [phone]     f/u HEME/ONC CONSULT      Reason for Visit:   Juan Pablo Sanchez is a 63 y.o. female who is seen by synchronous (real-time) audio-video technology for follow up of breast cancer      Referring physician: Dr. Kate Anderson physician: Dr. Dixie Kanner, Dr. Abdulkadir Granados    HPI: Juan Pablo Sanchez is a 63 y.o.  female who I am seeing in f/u for management of breast cancer. She states she had a negative mammogram in 11/2013 and then felt a mass in her right breast in late 2/2014. Menarche at 16, first of 2 pregnancies at 26, menopause at 51, no history of HRT. FH + for her mother having breast cancer in her late 46s. No FH of ovarian cancer. Biopsy on 3/6/14 shows invasive mammary carcinoma, 0.7 cm, gr 3, ER + > 90%, RI + > 90%, HER 2 negative (ratio 1.13; sig/nuc 3.7). 3/26/14 SLN bx 4/7, 1.5 cm largest met, + CARLOS. S/p DD AC-T q 2 weeks x 4 for each (60/600/175 mg/m2), started 4/2/14. Completed AC 5/14/14, taxol 7/9/14. S/p bilateral mastectomies on 8/14/14, left breast negative, right breast no residual disease, 0/11 LN involved, pCR, RCB-0, [id number].    10/18/14 expanders removed due to infected left breast expander. XRT 11/17/14-12/24/14    Started letrozole- 1/1/2015-1/20/16 (joint pain)  Anastrozole 2/12/16- 4/6/16  Tamoxifen 4/25/2016-5/8/16   Exemestane 5/24/16-8/4/16 (joint pain)  toremefine 9/28/16-12/23/19    1/16/15 biopsy of right axilla was negative. 2v CABG on 1/3/20 with Dr. Dora Beckman s/p MI    Interval history:  Complains of gr 1 hot flashes, gr 1-2 insomnia, gr 3 loss of cognition and concentration, gr 1 anxiety, gr 1 neuropathy worse in feet    Ringing or buzzing in head, both ears, x 1 month. Saw ENT, they performed hearing test        DX   Encounter Diagnoses   Name Primary?     Malignant neoplasm of upper-outer quadrant of right breast in female, estrogen receptor positive ([facility] [address] Yes    Type 2 diabetes mellitus with diabetic neuropathy, with long-term current use of insulin ([facility]     Coronary artery disease involving native heart without angina pectoris, unspecified vessel or lesion type         Past Medical History:   Diagnosis Date    Cancer [facility])     R breast cancer dx 3/2014; chemo    Depression     Diabetes ([facility] [address] [facility]     Dyspepsia and other specified disorders of function of stomach     Hypercholesterolemia     MARISA (obstructive sleep apnea)     wears cpap    Other and unspecified hyperlipidemia     Psychiatric disorder     Stroke [facility]) 1980's    ?slight stroke due to oral contraceptives    Thyroid disease     Type II or unspecified type diabetes mellitus without mention of complication, uncontrolled     Unspecified hypothyroidism      Past Surgical History:   Procedure Laterality Date    BREAST SURGERY PROCEDURE UNLISTED  2/26/14    excisionl bx of lymlh nodes    HX BREAST RECONSTRUCTION  8/14/2014    BILATERAL BREAST RECONSTRUCTION WITH TISSUE EXPANDERS AND ALLODERM performed by Leandro Mccracken MD at [facility] MAIN OR    HX BREAST RECONSTRUCTION Bilateral 10/18/2014    BREAST TISSUE EXPANDER REMOVAL ( BILATERAL )  performed by Leandro Mccracken MD at [facility] MAIN OR    HX CERVICAL FUSION      Full ROM per pt    HX TONSILLECTOMY      HX VASCULAR ACCESS  3/26/14    adrian cath insertion    LA EXCISE HAND/FOOT NEUROMA      bilateral feet    SHOULDER SURG PROC UNLISTED      right     Social History     Socioeconomic History    Marital status: SINGLE     Spouse name: Not on file    Number of children: Not on file    Years of education: Not on file    Highest education level: Not on file   Tobacco Use    Smoking status: Former Smoker     Packs/day: 1.00     Years: 30.00     Pack years: 30.00    Smokeless tobacco: Never Used   Substance and Sexual Activity    Alcohol use: No    Drug use: No   Social History Narrative    Lives in [place] with 24 yo son and son's girlfriend. Has a 31 yo daughter.  for many years. Works as a  at an accounting firm. Likes to amado. Family History   Problem Relation Age of Onset    Diabetes Mother         type 2    Thyroid Disease Mother     Heart Disease Mother     Cancer Mother 61        breast & lung    Hypertension Mother     Diabetes Father         type 1     Diabetes Sister         borderline    Stroke Neg Hx        Current Outpatient Medications   Medication Sig Dispense Refill    metoprolol succinate (TOPROL-XL) 50 mg XL tablet Take  by mouth daily.  aspirin delayed-release 81 mg tablet Take  by mouth daily.  pregabalin (LYRICA) 50 mg capsule Take 1 cap by mouth three times a day 90 Cap 1    clotrimazole (LOTRIMIN) 1 % topical cream Apply  to affected area two (2) times a day. 15 g 0    lidocaine (LIDODERM) 5 % APPLY PATCH EXTERNALLY TO AFFECTED AREA FOR 12 HOURS DAILY, THEN REMOVE FOR 12 HOURS 15 Patch 3    amphetamine-dextroamphetamine XR (ADDERALL XR) 20 mg XR capsule Take 15 mg by mouth daily.  ALPRAZolam (XANAX) 0.25 mg tablet TAKE 1 TABLET 3 TIMES A DAY AS NEEDED FOR ANXIETY  1    VICTOZA 2-DAKSHA 0.6 mg/0.1 mL (18 mg/3 mL) sub-q pen INJECT 1.2 MG ONCE A DAY SUBCUTANEOUSLY  3    levothyroxine (SYNTHROID) 200 mcg tablet Take 200 mcg by mouth Daily (before breakfast).  OTHER Breast Prosthesis  Mastectomy Bra    Dx: C50.919 1 Device 1    ACCU-CHEK COMPACT TEST strip   0    HUMULIN R U-500, CONC, KWIKPEN 500 unit/mL (3 mL) inpn   3    insulin CONCENTRATED regular (U-500 CONCENTRATED) 500 unit/mL soln by SubCUTAneous route.  FLUoxetine (PROZAC) 20 mg capsule Take 20 mg by mouth nightly.  rosuvastatin (CRESTOR) 10 mg tablet Take 10 mg by mouth nightly.  Insulin Needles, Disposable, (BD INSULIN PEN NEEDLE UF SHORT) 31 X 5/16 \" Ndle by Does Not Apply route two (2) times a day.  1 Package 11    diphenhydrAMINE (BENADRYL) 25 mg capsule Take 25 mg by mouth nightly as needed.  FARESTON 60 mg tab TAKE 1 TABLET BY MOUTH EVERY DAY 30 Tab 5       Allergies   Allergen Reactions    Ceftin [Cefuroxime Axetil] Hives       Review of Systems    A comprehensive review of systems was performed and all systems were negative except for HPI and for the symptom report form, reviewed and scanned in.    Objective:  Physical Exam:  There were no vitals taken for this visit. General: alert, cooperative, no distress   Mental  status: normal mood, behavior, speech, dress, motor activity, and thought processes, able to follow commands   HENT: NCAT   Neck: no visualized mass   Resp: no respiratory distress   Neuro: no gross deficits   Skin: no discoloration or lesions of concern on visible areas   Psychiatric: normal affect, consistent with stated mood, no evidence of hallucinations       Due to this being a TeleHealth evaluation (During [facility]- public health emergency), many elements of the physical examination are unable to be assessed. Evaluation of the following organ systems was limited: Vitals/Constitutional/EENT/Resp/CV/GI//MS/Neuro/Skin/Heme-Lymph-Imm. Diagnostic Imaging        Narrative   5/14/14 MAMMO  IMPRESSION: Stable mammogram. Next bilateral diagnostic Mammogram is   recommended in November, 2014. The patient was notified of these results. BIRADS 6: Known breast cancer. 3/20/14 CT c/a/p  IMPRESSION:   1. No evidence of metastatic disease. 2. Right breast mass with prominent but not pathologically enlarged right   axillary lymph nodes. 3. Hepatic steatosis.    4. Left renal cyst.       Bone scan:  Negative    3/24/14 dexa  Lumbar spine: L1-4   Bone mineral density (gm/cm2): 0.943   % of peak bone mass: 79   % for age matched controls: 78   T score: -2   Z score: -2.1   Hip: Right femoral neck   Bone mineral density (gm/cm2): 0.772   % of peak bone mass: 74   % for age matched controls: 78   T score: -1.9   Z score: -1.5 IMPRESSION:   This patient is osteopenic using the World Health Organization criteria   10 year probability of major osteoporotic fracture: 6.3%   10 year probability of hip fracture: 1.2%    3/16/15 LLE negative    5/8/15 brain MRI:  Normal    5/27/16 DEXA  Findings:     Femoral Neck:  Right  Bone mineral density (gm/cm2):? 0.831  % of peak bone mass: 80  % for age matched controls:? 80  T-score: -1.5  Z-score: -1.0     Total Hip: right  Bone mineral density (gm/cm2):  0.930  % of peak bone mass:   92  % for age matched controls:  80  T-score:   -0.6  Z-score:  -0.5     Lumbar Spine:  L1-4  Bone mineral density (gm/cm2):  0.984  % of peak bone mass:  83    % for age matched controls:  80  T-score:  -1.7  Z-score:  -1.7        IMPRESSION:     This patient is osteopenic using the World Health Organization criteria  10 year probability of major osteoporotic fracture:  5.8%  10 year probability of hip fracture:  0.4%    3/17/19 US chest  IMPRESSION: No acute abnormality is seen in the left neck or clavicular area of  palpable concern.          Lab Results  Lab Results   Component Value Date/Time    WBC 6.1 05/19/2017 01:22 PM    Hemoglobin (POC) 12.9 12/19/2016 02:46 PM    HGB 13.5 05/19/2017 01:22 PM    Hematocrit (POC) 38 12/19/2016 02:46 PM    HCT 38.7 05/19/2017 01:22 PM    PLATELET 809 29/96/3372 01:22 PM    MCV 86.6 05/19/2017 01:22 PM     Lab Results   Component Value Date/Time    Sodium 141 06/19/2017 02:51 PM    Potassium 3.8 06/19/2017 02:51 PM    Chloride 105 06/19/2017 02:51 PM    CO2 26 06/19/2017 02:51 PM    Anion gap 10 06/19/2017 02:51 PM    Glucose 210 (H) 06/19/2017 02:51 PM    BUN 10 06/19/2017 02:51 PM    Creatinine 0.85 06/19/2017 02:51 PM    BUN/Creatinine ratio 12 06/19/2017 02:51 PM    GFR est AA >60 06/19/2017 02:51 PM    GFR est non-AA >60 06/19/2017 02:51 PM    Calcium 8.5 06/19/2017 02:51 PM    AST (SGOT) 38 (H) 05/19/2017 01:22 PM    Alk.  phosphatase 58 05/19/2017 01:22 PM    Protein, total 7.1 05/19/2017 01:22 PM    Albumin 3.6 06/19/2017 02:51 PM    Globulin 3.0 05/19/2017 01:22 PM    A-G Ratio 1.4 05/19/2017 01:22 PM    ALT (SGPT) 49 05/19/2017 01:22 PM     Assessment/Plan:  63 y.o. female with IMC gr 3, ER+, DC +, HER 2 negative. [id number]. 4/7 LN involved. pCR s/p neoadjuvant chemo  PS 0    1. Breast cancer stage: IIIA clinically    Hormonal therapy: administered    Based on her mother's history of breast cancer, it was reasonable to genetic test this patient. BRCA 1/2 negative. She was unable to tolerate letrozole or anastrozole or exemestane due to joint pain. Tamoxifen started on 4/25/16 and stopped on 5/8/16 due to labile mood and swelling. Last eye exam: 2019  Last gyn exam: 5/2019, had uterine US showing thickened uterus, pt states bx was done and was normal    No evidence of recurrence. Taking toremefine 60 mg daily. Recommend 10 years of endocrine therapy since she was LN +. She has been off since Dec, and would like to remain off for now. She will revisit in one year. She takes asa daily, not eligible for the asa study. 2. Depression/anxiety: stable, no SI/HI; on Prozac 30mg. Pt is currently attending breast cancer support group. Sees Dr. Jana Contreras at [address] [place] [address]; on adderall    3. Osteopenia:  We previously discussed the data from [address] [place] [address], [facility] 2013, for the meta-analysis for adjuvant bisphosphonate use in breast cancer. We discussed that in postmenopausal women, it appears that the bisphosphate zolendronic acid, given as in ABCSG 12 at 4 mg IV q 6 months x 3 years, is beneficial in improving bone DFS and OS. We discussed side effects such as ONJ and the need to avoid invasive dental procedures while on these medications, renal damage, and hypocalcemia. Last dose, #6, 6/19/17    4. Neuropathy: ongoing, followed by Dr. Gloria Randolph at [address] [place] [address], Lyrica to 50 mg TID. She is diabetic.  At this point neuropathy worsening is not due to chemotherapy, she is following with her Endocrinologist. Also, has carpel tunnel, advise to wear splints    5. B12 def: B12 injections. 6. Joint pain, trigger fingers: s/p surgery with Dr. Karen Valdez on L hand and inj on R hand. improved    7. Severe obesity:  She is exercising; has gained 8 lbs    8. DM2, on insulin:  Insulin; with neuropathy; now on insulin pump    9. CAD:  S/p CABG; records reviewed and summarized above    10. Buzzing in ears bilaterally:  New; Will get brain MRI     I was in the office while conducting this encounter. The patient was at her home. Consent:  She and/or her healthcare decision maker is aware that this patient-initiated Telehealth encounter is a billable service, with coverage as determined by her insurance carrier. She is aware that she may receive a bill and has provided verbal consent to proceed: Yes    Pursuant to the emergency declaration under the [address] and the [facility] waiver authority and the [employer] Act, this Virtual  Visit was conducted, with patient's (and/or legal guardian's) consent, to reduce the patient's risk of exposure to COVID-19 and provide necessary medical care. Services were provided through a video synchronous discussion virtually to substitute for in-person clinic visit. Follow-up and Dispositions    · Return in about 1 year (around 4/20/2021).          Mario Nunes MD

## 2020-05-18 ENCOUNTER — HOSPITAL ENCOUNTER (OUTPATIENT)
Dept: MRI IMAGING | Age: 60
Discharge: HOME | End: 2020-05-18
Attending: INTERNAL MEDICINE
Payer: COMMERCIAL

## 2020-05-18 VITALS — WEIGHT: 205 LBS | BODY MASS INDEX: 36.32 KG/M2 | HEIGHT: 63 IN

## 2020-05-18 DIAGNOSIS — Z17.0 MALIGNANT NEOPLASM OF UPPER-OUTER QUADRANT OF RIGHT BREAST IN FEMALE, ESTROGEN RECEPTOR POSITIVE (HCC): ICD-10-CM

## 2020-05-18 DIAGNOSIS — C50.411 MALIGNANT NEOPLASM OF UPPER-OUTER QUADRANT OF RIGHT BREAST IN FEMALE, ESTROGEN RECEPTOR POSITIVE (HCC): ICD-10-CM

## 2020-05-18 PROCEDURE — 70553 MRI BRAIN STEM W/O & W/DYE: CPT

## 2020-05-18 PROCEDURE — A9575 INJ GADOTERATE MEGLUMI 0.1ML: HCPCS | Performed by: RADIOLOGY

## 2020-05-18 PROCEDURE — 74011250636 HC RX REV CODE- 250/636: Performed by: RADIOLOGY

## 2020-05-18 RX ORDER — GADOTERATE MEGLUMINE 376.9 MG/ML
18 INJECTION INTRAVENOUS
Status: COMPLETED | OUTPATIENT
Start: 2020-05-18 | End: 2020-05-18

## 2020-05-18 RX ADMIN — GADOTERATE MEGLUMINE 18 ML: 376.9 INJECTION INTRAVENOUS at 12:54

## 2020-05-18 NOTE — PROGRESS NOTES
I called and spoke with the Patient and let her know that per Lauren Meyers her Brain MRI was Normal. She verbalized understanding and had no further question's at that time.

## 2020-05-29 ENCOUNTER — OFFICE VISIT (OUTPATIENT)
Dept: SURGERY | Age: 60
End: 2020-05-29

## 2020-05-29 VITALS
WEIGHT: 209.4 LBS | SYSTOLIC BLOOD PRESSURE: 116 MMHG | BODY MASS INDEX: 37.1 KG/M2 | RESPIRATION RATE: 20 BRPM | TEMPERATURE: 98.8 F | OXYGEN SATURATION: 97 % | HEIGHT: 63 IN | HEART RATE: 98 BPM | DIASTOLIC BLOOD PRESSURE: 70 MMHG

## 2020-05-29 DIAGNOSIS — Z85.3 HX: BREAST CANCER: ICD-10-CM

## 2020-05-29 DIAGNOSIS — R22.2 CHEST WALL MASS: Primary | ICD-10-CM

## 2020-05-29 RX ORDER — NYSTATIN 100000 U/G
30 CREAM TOPICAL 2 TIMES DAILY
Qty: 120 G | Refills: 11 | Status: SHIPPED | OUTPATIENT
Start: 2020-05-29 | End: 2021-05-29

## 2020-05-29 NOTE — PROGRESS NOTES
HISTORY OF PRESENT ILLNESS  Yoshi Montes is a 63 y.o. female who comes in for follow up of her breast cancer and fullness and tenderness over the right chest wall  Breast Cancer   Associated symptoms include chest pain. Pertinent negatives include no abdominal pain, no headaches and no shortness of breath. Follow-up   Associated symptoms include chest pain. Pertinent negatives include no abdominal pain, no headaches and no shortness of breath. Breast Problem   Associated symptoms include chest pain. Pertinent negatives include no abdominal pain, no headaches and no shortness of breath. Procedure   Associated symptoms include chest pain. Pertinent negatives include no abdominal pain, no headaches and no shortness of breath. Chest Pain (Angina)    Associated symptoms include back pain. Pertinent negatives include no abdominal pain, no claudication, no cough, no diaphoresis, no dizziness, no fever, no headaches, no hemoptysis, no malaise/fatigue, no nausea, no orthopnea, no palpitations, no PND, no shortness of breath, no sputum production, no vomiting and no weakness. Surgical Follow-up   Associated symptoms include chest pain. Pertinent negatives include no abdominal pain, no headaches and no shortness of breath. Skin Problem   Associated symptoms include chest pain. Pertinent negatives include no abdominal pain, no headaches and no shortness of breath. She was diagnosed in March 2014 with invasive mammary carcinoma ER/NV positive and her2/carlos unamplified. Breast MRI demonstrated the lesion to be 5 x 7.5 cm and SLNB demonstrated 4/7 positive nodes. She has completed neoadjuvant chemotherapy with Dr Ke Alejo and repeat MRI 7/15/2014 demonstrated near complete response with a 6 x 6 x 4 mm focus of enhancement near the 9-10 o'clock region middle third. She had menarche at 16, first of two pregnancies at 26, menopause at 51 and did not take HRT. Her mother had breast cancer in her 46s.   No family hx of ovarian cancer. She underwent bilateral mastectomy and right ALND with reconstruction and had pCR. She developed infection in her expanders and they were removed Gilbert Orr). She completed radiation and is took letrozole and anastrozole but did not tolerated them. She recently started tamoxifen but she gained 8 pounds in one week and felt terrible and stopped it. She has 23a nodular area laterally on the right chest which I did an FNA on in Jan 2015 and came back with some lymphocytes and blood but no sign of malignancy. She has pain under her right arm/axillary area. She had a right sided headache in May 2015 and brain MRI was negative. US core bx on 7/31/2015 demonstrated fragments of mature fibroadipose tissue with areas of fat necrosis without atypia or malignancy. Dr Rachelle Hodge tried fatgrafting and modification of her cosmesis in Nov 2015 without success. She still has some pain and fullness along the right chest wall.     Past Medical History:   Diagnosis Date    Cancer [facility])     R breast cancer dx 3/2014; chemo    Depression     Diabetes ([facility] [address]     Dyspepsia and other specified disorders of function of stomach     Hypercholesterolemia     MARISA (obstructive sleep apnea)     wears cpap    Other and unspecified hyperlipidemia     Psychiatric disorder     Stroke [facility]) 1980's    ?slight stroke due to oral contraceptives    Thyroid disease     Type II or unspecified type diabetes mellitus without mention of complication, uncontrolled     Unspecified hypothyroidism      Past Surgical History:   Procedure Laterality Date    BREAST SURGERY PROCEDURE UNLISTED  2/26/14    excisionl bx of lymlh nodes    HX BREAST RECONSTRUCTION  8/14/2014    BILATERAL BREAST RECONSTRUCTION WITH TISSUE EXPANDERS AND ALLODERM performed by Drexel Curling, MD at [facility] MAIN OR    HX BREAST RECONSTRUCTION Bilateral 10/18/2014    BREAST TISSUE EXPANDER REMOVAL ( BILATERAL )  performed by Drexel Curling, MD at [facility] MAIN OR  HX CERVICAL FUSION      Full ROM per pt    HX TONSILLECTOMY      HX VASCULAR ACCESS  3/26/14    adrian cath insertion    NV EXCISE HAND/FOOT NEUROMA      bilateral feet    SHOULDER SURG PROC UNLISTED      right     Family History   Problem Relation Age of Onset    Diabetes Mother         type 2    Thyroid Disease Mother     Heart Disease Mother     Cancer Mother 61        breast & lung    Hypertension Mother     Diabetes Father         type 1     Diabetes Sister         borderline    Stroke Neg Hx      Social History     Tobacco Use    Smoking status: Former Smoker     Packs/day: 1.00     Years: 30.00     Pack years: 30.00    Smokeless tobacco: Never Used   Substance Use Topics    Alcohol use: No    Drug use: No     Current Outpatient Medications   Medication Sig    pregabalin (LYRICA) 50 mg capsule TAKE 1 CAPSULE BY MOUTH THREE TIMES A DAY    metoprolol succinate (TOPROL-XL) 50 mg XL tablet Take  by mouth daily.  aspirin delayed-release 81 mg tablet Take  by mouth daily.  clotrimazole (LOTRIMIN) 1 % topical cream Apply  to affected area two (2) times a day.  FARESTON 60 mg tab TAKE 1 TABLET BY MOUTH EVERY DAY    lidocaine (LIDODERM) 5 % APPLY PATCH EXTERNALLY TO AFFECTED AREA FOR 12 HOURS DAILY, THEN REMOVE FOR 12 HOURS    amphetamine-dextroamphetamine XR (ADDERALL XR) 20 mg XR capsule Take 15 mg by mouth daily.  ALPRAZolam (XANAX) 0.25 mg tablet TAKE 1 TABLET 3 TIMES A DAY AS NEEDED FOR ANXIETY    VICTOZA 2-DAKSHA 0.6 mg/0.1 mL (18 mg/3 mL) sub-q pen INJECT 1.2 MG ONCE A DAY SUBCUTANEOUSLY    levothyroxine (SYNTHROID) 200 mcg tablet Take 200 mcg by mouth Daily (before breakfast).  OTHER Breast Prosthesis  Mastectomy Bra    Dx: C50.919    ACCU-CHEK COMPACT TEST strip     HUMULIN R U-500, CONC, KWIKPEN 500 unit/mL (3 mL) inpn     insulin CONCENTRATED regular (U-500 CONCENTRATED) 500 unit/mL soln by SubCUTAneous route.     FLUoxetine (PROZAC) 20 mg capsule Take 20 mg by mouth nightly.  rosuvastatin (CRESTOR) 10 mg tablet Take 10 mg by mouth nightly.  Insulin Needles, Disposable, (BD INSULIN PEN NEEDLE UF SHORT) 31 X 5/16 \" Ndle by Does Not Apply route two (2) times a day.  diphenhydrAMINE (BENADRYL) 25 mg capsule Take 25 mg by mouth nightly as needed. No current facility-administered medications for this visit. Allergies   Allergen Reactions    Ceftin [Cefuroxime Axetil] Hives       Review of Systems   Constitutional: Negative for chills, diaphoresis, fever, malaise/fatigue and weight loss. HENT: Negative for congestion, ear pain and sore throat. Eyes: Negative for blurred vision and pain. Respiratory: Negative for cough, hemoptysis, sputum production, shortness of breath, wheezing and stridor. Cardiovascular: Positive for chest pain. Negative for palpitations, orthopnea, claudication, leg swelling and PND. Gastrointestinal: Negative for abdominal pain, blood in stool, constipation, diarrhea, heartburn, melena, nausea and vomiting. Genitourinary: Negative for dysuria, flank pain, frequency, hematuria and urgency. Musculoskeletal: Positive for back pain, joint pain and myalgias. Negative for neck pain. Skin: Negative for itching and rash. Neurological: Negative for dizziness, tremors, focal weakness, seizures, weakness and headaches. Reduced cognition. Improved on Adderral   Endo/Heme/Allergies: Negative for polydipsia. Psychiatric/Behavioral: Positive for depression. Negative for memory loss. The patient is nervous/anxious. Visit Vitals  /70 (BP 1 Location: Left arm, BP Patient Position: Sitting)   Pulse 98   Temp 98.8 °F (37.1 °C) (Oral)   Resp 20   Ht 5' 3\" (1.6 m)   Wt 95 kg (209 lb 6.4 oz)   SpO2 97%   BMI 37.09 kg/m²       Physical Exam  Exam conducted with a chaperone present. Constitutional:       General: She is not in acute distress. Appearance: She is well-developed. She is not diaphoretic.    HENT: Head: Normocephalic and atraumatic. Mouth/Throat:      Pharynx: No oropharyngeal exudate. Eyes:      General: No scleral icterus. Conjunctiva/sclera: Conjunctivae normal.      Pupils: Pupils are equal, round, and reactive to light. Neck:      Musculoskeletal: Normal range of motion and neck supple. Thyroid: No thyromegaly. Vascular: No JVD. Trachea: No tracheal deviation. Cardiovascular:      Rate and Rhythm: Normal rate and regular rhythm. Heart sounds: No murmur. No friction rub. No gallop. Pulmonary:      Effort: Pulmonary effort is normal. No respiratory distress. Breath sounds: Normal breath sounds. No wheezing or rales. Chest:      Breasts: Breasts are symmetrical.         Right: Skin change (a lot of redundant skin and subcutaneous tissue with ??nodular ridge near axilla) present. No mass (fullness and nodularity at 0600) or tenderness. Left: No mass, skin change or tenderness. Abdominal:      General: Bowel sounds are normal. There is no distension. Palpations: Abdomen is soft. There is no mass. Tenderness: There is no abdominal tenderness. There is no guarding or rebound. Musculoskeletal: Normal range of motion. Lymphadenopathy:      Cervical: No cervical adenopathy. Upper Body:      Right upper body: No supraclavicular, axillary or pectoral adenopathy. Left upper body: No supraclavicular, axillary or pectoral adenopathy. Skin:     General: Skin is warm and dry. Coloration: Skin is not pale. Findings: Rash (fungal rash under both axillae) present. No erythema. Neurological:      Mental Status: She is alert and oriented to person, place, and time. Cranial Nerves: No cranial nerve deficit. Psychiatric:         Behavior: Behavior normal.         Thought Content: Thought content normal.         Judgment: Judgment normal.                            ASSESSMENT and PLAN  1.   Right breast cancer T3N2M0:  Dx 3/2014 JOSE at 6 years and 6 months  She is now on Ferestan  She will discuss alternative therapies with Dr Federica Whitlock at next visit    2. Right axillary/breast nodule is ? ?scar vs recurrence (doubtful)  It is unchanged in 12 months and FNA was negative. 3.  Right chest wall pain and fullness near inferior mammary fold    4. Lymphedema of RUE. Mild. Has seen lymphedema clinic and is currently not wearing compression garment  5. Poor cosmesis since implant loss. S/p fat grafting by Dr Devika Chow and prostheses prn  6. ?left axillary nodule is likely dermal cyst  7. Chemobrain.   Now on adderal      RTC 1 year    Chago Giordano MD FACS        Med Onc:  Tonny Fan MD  PCP:  Jarad Angel MD

## 2020-05-29 NOTE — PROGRESS NOTES
Chief Complaint   Patient presents with    Skin Problem     PT feel spot on right chest wall/ S/p right breast cancer 2014. 1. Have you been to the ER, urgent care clinic since your last visit?no  Hospitalized since your last visit?no    2. Have you seen or consulted any other health care providers outside of the [address] [place] [address] since your last visit? yes  Include any pap smears or colon screening.

## 2020-06-04 ENCOUNTER — TELEPHONE (OUTPATIENT)
Dept: SURGERY | Age: 60
End: 2020-06-04

## 2020-06-04 NOTE — TELEPHONE ENCOUNTER
Patient's ct scan was cancelled due to no authorization. She was told to call and speak to the nurse of Dr Abbi De Souza regarding getting insurance authorization for the ct.

## 2020-06-05 NOTE — TELEPHONE ENCOUNTER
Informed patient sometimes it takes # days for authorizations pending type insurance & scheduling obtain authorizations. Express understanding.

## 2020-06-09 ENCOUNTER — HOSPITAL ENCOUNTER (OUTPATIENT)
Dept: CT IMAGING | Age: 60
Discharge: HOME | End: 2020-06-09
Attending: SURGERY
Payer: COMMERCIAL

## 2020-06-09 DIAGNOSIS — Z85.3 HX: BREAST CANCER: ICD-10-CM

## 2020-06-09 DIAGNOSIS — R22.2 CHEST WALL MASS: ICD-10-CM

## 2020-06-09 LAB — CREAT BLD-MCNC: 0.5 MG/DL (ref 0.6–1.3)

## 2020-06-09 PROCEDURE — 82565 ASSAY OF CREATININE: CPT

## 2020-06-09 PROCEDURE — 71260 CT THORAX DX C+: CPT

## 2020-06-09 PROCEDURE — 74011636320 HC RX REV CODE- 636/320: Performed by: RADIOLOGY

## 2020-06-09 RX ADMIN — IOPAMIDOL 100 ML: 612 INJECTION, SOLUTION INTRAVENOUS at 08:49

## 2020-06-10 ENCOUNTER — TELEPHONE (OUTPATIENT)
Dept: SURGERY | Age: 60
End: 2020-06-10

## 2020-07-19 DIAGNOSIS — G62.9 NEUROPATHY: ICD-10-CM

## 2020-07-20 RX ORDER — PREGABALIN 50 MG/1
CAPSULE ORAL
Qty: 90 CAP | Refills: 1 | Status: SHIPPED | OUTPATIENT
Start: 2020-07-20 | End: 2020-09-28

## 2020-09-21 DIAGNOSIS — G62.9 NEUROPATHY: ICD-10-CM

## 2020-09-28 RX ORDER — PREGABALIN 50 MG/1
CAPSULE ORAL
Qty: 90 CAP | Refills: 1 | Status: SHIPPED | OUTPATIENT
Start: 2020-09-28 | End: 2020-12-04

## 2020-12-04 DIAGNOSIS — G62.9 NEUROPATHY: ICD-10-CM

## 2020-12-04 RX ORDER — PREGABALIN 50 MG/1
CAPSULE ORAL
Qty: 90 CAP | Refills: 1 | Status: SHIPPED | OUTPATIENT
Start: 2020-12-04 | End: 2022-06-24 | Stop reason: DRUGHIGH

## 2021-03-05 ENCOUNTER — OFFICE VISIT (OUTPATIENT)
Dept: NEUROLOGY | Age: 61
End: 2021-03-05
Payer: COMMERCIAL

## 2021-03-05 VITALS
HEIGHT: 63 IN | SYSTOLIC BLOOD PRESSURE: 115 MMHG | WEIGHT: 213 LBS | OXYGEN SATURATION: 90 % | DIASTOLIC BLOOD PRESSURE: 80 MMHG | HEART RATE: 92 BPM | BODY MASS INDEX: 37.74 KG/M2

## 2021-03-05 DIAGNOSIS — R41.89 COGNITIVE IMPAIRMENT: Primary | ICD-10-CM

## 2021-03-05 PROCEDURE — 99204 OFFICE O/P NEW MOD 45 MIN: CPT | Performed by: PSYCHIATRY & NEUROLOGY

## 2021-03-05 NOTE — PROGRESS NOTES
NEUROLOGY NEW PATIENT OFFICE CONSULTATION      3/5/2021    RE: Lena Nelson         1960      REFERRED BY:  Ian Peterson NP        CHIEF COMPLAINT:  This is Lena Nelson is a 62 y.o. female  for Gisele Denton who had concerns including Memory Loss (confussion ). HPI:     Since 2015, after having chemotherapy for breast CA, patient noted memory issues described as forgetting that she charged her credit card. Patient  1 1/2 yrs ago had psychological testing from outside showed \"lower level abnormal\" as per patient. Taking Adderall which helps. Getting Vit B12 shots.    (+) R chronic tinnitus - saw ENT. ROS   (-) fever  (-) rash  All other systems reviewed and are negative    Past Medical Hx  Past Medical History:   Diagnosis Date    Cancer ([facility] [address]     R breast cancer dx 3/2014; chemo    Depression     Diabetes ([facility] [address]     Dyspepsia and other specified disorders of function of stomach     Hypercholesterolemia     MARISA (obstructive sleep apnea)     wears cpap    Other and unspecified hyperlipidemia     Psychiatric disorder     Stroke [facility]) 1980's    ?slight stroke due to oral contraceptives    Thyroid disease     Type II or unspecified type diabetes mellitus without mention of complication, uncontrolled     Unspecified hypothyroidism        Social Hx  Social History     Socioeconomic History    Marital status: SINGLE     Spouse name: Not on file    Number of children: Not on file    Years of education: Not on file    Highest education level: Not on file   Tobacco Use    Smoking status: Former Smoker     Packs/day: 1.00     Years: 30.00     Pack years: 30.00    Smokeless tobacco: Never Used   Substance and Sexual Activity    Alcohol use: No    Drug use: No   Social History Narrative    Lives in [place] with 24 yo son and son's girlfriend. Has a 33 yo daughter.  for many years. Works as a  at an accounting firm. Likes to amado. Family Hx  Family History   Problem Relation Age of Onset    Diabetes Mother         type 2    Thyroid Disease Mother     Heart Disease Mother     Cancer Mother 61        breast & lung    Hypertension Mother     Diabetes Father         type 1     Diabetes Sister         borderline    Stroke Neg Hx    Aunt - dementia    ALLERGIES  Allergies   Allergen Reactions    Ceftin [Cefuroxime Axetil] Hives       CURRENT MEDS  Current Outpatient Medications   Medication Sig Dispense Refill    pregabalin (LYRICA) 50 mg capsule TAKE 1 CAPSULE BY MOUTH THREE TIMES A DAY 90 Cap 1    aspirin delayed-release 81 mg tablet Take  by mouth daily.  amphetamine-dextroamphetamine XR (ADDERALL XR) 20 mg XR capsule Take 15 mg by mouth daily.  ALPRAZolam (XANAX) 0.25 mg tablet TAKE 1 TABLET 3 TIMES A DAY AS NEEDED FOR ANXIETY  1    levothyroxine (SYNTHROID) 200 mcg tablet Take 200 mcg by mouth Daily (before breakfast).  ACCU-CHEK COMPACT TEST strip   0    HUMULIN R U-500, CONC, KWIKPEN 500 unit/mL (3 mL) inpn   3    insulin CONCENTRATED regular (U-500 CONCENTRATED) 500 unit/mL soln by SubCUTAneous route.  FLUoxetine (PROZAC) 20 mg capsule Take 20 mg by mouth nightly.  rosuvastatin (CRESTOR) 10 mg tablet Take 10 mg by mouth nightly.  Insulin Needles, Disposable, (BD INSULIN PEN NEEDLE UF SHORT) 31 X 5/16 \" Ndle by Does Not Apply route two (2) times a day. 1 Package 11    diphenhydrAMINE (BENADRYL) 25 mg capsule Take 25 mg by mouth nightly as needed.  nystatin (MYCOSTATIN) topical cream Apply 30 g to affected area two (2) times a day. 120 g 11    metoprolol succinate (TOPROL-XL) 50 mg XL tablet Take  by mouth daily.  clotrimazole (LOTRIMIN) 1 % topical cream Apply  to affected area two (2) times a day.  15 g 0    FARESTON 60 mg tab TAKE 1 TABLET BY MOUTH EVERY DAY 30 Tab 5    lidocaine (LIDODERM) 5 % APPLY PATCH EXTERNALLY TO AFFECTED AREA FOR 12 HOURS DAILY, THEN REMOVE FOR 12 HOURS 15 Patch 3    VICTOZA 2-DAKSHA 0.6 mg/0.1 mL (18 mg/3 mL) sub-q pen INJECT 1.2 MG ONCE A DAY SUBCUTANEOUSLY  3    OTHER Breast Prosthesis  Mastectomy Bra    Dx: C50.919 1 Device 1           PREVIOUS WORKUP: (reviewed)  IMAGING:    CT Results (recent):  Results from [place] encounter on 06/09/20   CT CHEST W CONT    Narrative INDICATION: right chest wall fullness    COMPARISON: 10/14/2015    TECHNIQUE:  Following the uneventful intravenous administration of 100 cc  Isovue-300, 5 mm axial images were obtained through the chest. Coronal and  sagittal reformats were generated. CT dose reduction was achieved through use  of a standardized protocol tailored for this examination and automatic exposure  control for dose modulation. FINDINGS:    CHEST WALL: No mass or axillary lymphadenopathy. Prior right lumpectomy and  right axillary dissection. THYROID: No nodule. MEDIASTINUM: No mass or lymphadenopathy. MARY: No mass or lymphadenopathy. THORACIC AORTA: No dissection or aneurysm. MAIN PULMONARY ARTERY: Normal in caliber. TRACHEA/BRONCHI: Patent. ESOPHAGUS: No wall thickening or dilatation. HEART: Normal in size. Heavy coronary artery calcification. PLEURA: No effusion or pneumothorax. LUNGS: No nodule, mass, or airspace disease. Small amount of pleural parenchymal  nodularity along the anterior aspect of the right middle lobe. INCIDENTALLY IMAGED UPPER ABDOMEN: Diffuse hypodensity of the liver. Pancreatic  head fat-containing 16 mm lipoma (coronal image 67). BONES: No destructive bone lesion. Nonunion of the sternum. Impression IMPRESSION:  No evidence for metastatic disease to the chest.   Post operative and post treatment changes as described.    Hepatic steatosis           MRI Results (recent):  Results from Hospital Encounter encounter on 05/18/20   MRI BRAIN W WO CONT    Narrative INDICATION:  eval for mets    COMPARISON:  December 27, 2016    TECHNIQUE:  Multiplanar multisequence acquisition without and with IV contrast  of the brain. FINDINGS:       Ventricles:  Midline, no hydrocephalus. Brain Parenchyma/Brainstem:  Mild chronic T2 hyperintensities in the  supratentorial brain. No acute infarction. Intracranial Hemorrhage:  None. Basal Cisterns:  Normal.   Flow Voids:  Normal.  Post Contrast:  No abnormal parenchymal or meningeal enhancement. Additional Comments:  N/A. Impression IMPRESSION:    1. No evidence of intracranial metastatic disease. 2. Several small white matter signal abnormalities may be due to chronic small  vessel ischemic change. No acute process. IR Results (recent):  No results found for this or any previous visit. VAS/US Results (recent):  Results from [place] encounter on 03/16/15   DUPLEX LOWER EXT VENOUS LEFT           LABS (reviewed)  Results for orders placed or performed during the hospital encounter of 06/09/20   CREATININE, POC   Result Value Ref Range    Creatinine (POC) 0.5 (L) 0.6 - 1.3 mg/dL    GFRAA, POC >60 >60 ml/min/1.73m2    GFRNA, POC >60 >60 ml/min/1.73m2       Physical Exam:     Visit Vitals  /80   Pulse 92   Ht 5' 3\" (1.6 m)   Wt 96.6 kg (213 lb)   SpO2 90%   BMI 37.73 kg/m²     General:  Alert, cooperative, no distress. Head:  Normocephalic, without obvious abnormality, atraumatic. Eyes:  Conjunctivae/corneas clear. Lungs:  Heart:   Non labored breathing  Regular rate and rhythm, no carotid bruits   Abdomen:   Soft, non-distended   Extremities: Extremities normal, atraumatic, no cyanosis or edema. Pulses: 2+ and symmetric all extremities. Skin: Skin color, texture, turgor normal. No rashes or lesions.   Neurologic Exam     Gen: Attention normal             Language: naming, repetition, fluency normal             Memory: intact recent and remote memory  Cranial Nerves:  I: smell Not tested   II: visual fields Full to confrontation   II: pupils Equal, round, reactive to light II: optic disc No papilledema   III,VII: ptosis none   III,IV,VI: extraocular muscles  Full ROM   V: mastication normal   V: facial light touch sensation  normal   VII: facial muscle function   symmetric   VIII: hearing symmetric   IX: soft palate elevation  normal   XI: trapezius strength  5/5   XI: sternocleidomastoid strength 5/5   XI: neck flexion strength  5/5   XII: tongue  midline     Motor: normal bulk and tone, no tremor              Strength: 5/5 all four extremities  Sensory: intact to LT, PP, vibration, and JPS  Reflexes: 2+ throughout; Down going toes  Coordination: Good FTN and HTS  Gait: normal gait including tandem            Impression:     Suly Zaman is a 62 y.o. female who  has a past medical history of Cancer ([facility] [address] Depression, Diabetes ([address] Dyspepsia and other specified disorders of function of stomach, Hypercholesterolemia, MARISA (obstructive sleep apnea), Other and unspecified hyperlipidemia, Psychiatric disorder, Stroke ([facility] [address] (1980's), Thyroid disease, Type II or unspecified type diabetes mellitus without mention of complication, uncontrolled, and Unspecified hypothyroidism who since 2015, after having chemotherapy for breast CA,  noted memory issues described as forgetting that she charged her credit card and having issues at work. Patient  1 1/2 yrs ago had psychological testing from outside showed \"lower level abnormal\" as per patient. Taking Adderall which helps. Getting Vit B12 shots. Considerations include mild cognitive impairment related to chemo (chemo brain fog) and vitamin B12 deficiency. Evaluate for dementia. RECOMMENDATIONS  1. I had a long discussion with patient . Discussed diagnosis, prognosis, pathophysiology and available treatment. All questions were answered. 2. Patient to get previous neuropsych testing for my review  3. Will refer to Dr Carter Barrios for neuropsychological testing to look for interval worsening  4.  MRI Brain to look for evidence of stroke and brain mass  5. Depending on above, will consider trial of Aricept  6. Patient getting Vit B12 shots c/o PCP      Follow-up and Dispositions    · Return for review of results.             Thank you for the consultation      Marry Escobar MD  Diplomate, American Board of Psychiatry and Neurology  Diplomate, Neuromuscular Medicine  Diplomate, American Board of Electrodiagnostic Medicine        CC: Apurva Francisco NP  Fax: [phone]

## 2021-03-05 NOTE — LETTER
3/5/2021 Patient: Ashwin Horan YOB: 1960 Date of Visit: 3/5/2021 Siria Kee NP 
[address] [address] [address] Fax: [phone] Dear Siria Kee NP, Thank you for referring Ms. Lincoln Plunkett to [facility] for evaluation. My notes for this consultation are attached. If you have questions, please do not hesitate to call me. I look forward to following your patient along with you. Sincerely, Qian Gray MD

## 2021-03-10 ENCOUNTER — HOSPITAL ENCOUNTER (OUTPATIENT)
Dept: MRI IMAGING | Age: 61
Discharge: HOME | End: 2021-03-10
Attending: PSYCHIATRY & NEUROLOGY
Payer: COMMERCIAL

## 2021-03-10 DIAGNOSIS — R41.89 COGNITIVE IMPAIRMENT: ICD-10-CM

## 2021-03-10 PROCEDURE — 70551 MRI BRAIN STEM W/O DYE: CPT

## 2021-04-12 ENCOUNTER — DOCUMENTATION ONLY (OUTPATIENT)
Dept: NEUROLOGY | Age: 61
End: 2021-04-12

## 2021-04-12 NOTE — PROGRESS NOTES
Reviewed neuropsychological evaluation done care of [facility] on August 19, 2019. Summary/impression:  Ms. Rehana Will is a 60-year-old right-handed,  woman who presented for neuropsychological reevaluation in order to monitor cognitive and emotional functioning following treatment of breast cancer. In addition to previously receiving B12 injections due to low counts, she has also maintain her stimulant medication (Adderall). Ms. Anyi Ellis continues to report memory concerns. She also has fluctuating emotional distress for which she has undergone psychotherapy. Cognitive results are thought to be valid. Ms. Mena Francisco baseline functioning was estimated to be average. Recent results suggest low average frontal lobe functioning. Ms. Anyi Ellis continues to demonstrate some slow processing however, her working memory and divided attention were all nicely within the average range. Inattention during lengthy test remains a weakness. With respect to visual-spatial functioning, scores have remained in the low average to average. Language function functioning demonstrated intact communication and word finding yet slow fluency at times. Memory continues to be most concerning. When looking across her previous evaluations, she appears to have variable learning and recall performance. Despite improvements of the last evaluation, her scores once again dropped to the impaired below average range. Ms. Anyi Ellis has difficulty starting large amount of information as her brain quickly becomes overwhelming. Repetition Is Beneficial and Assist with Storage. Ms. Louis Fletcher to Meet the Diagnostic Criteria for Cognitive Impairment. Although Many of Her Scores Hold Were in the Low Average Range, Her Memory Performance Is Again Dropped. This Parallels Her Ongoing Concern Reports of Variable Capability with Recall.   Ms. Anyi Ellis Has Difficulty Storing Large Amount of Information As Her Brain Quickly Becomes Overwhelming Cups. Repetition Is Beneficial and Assists with Storage This Should Continue to Be Her Go to Strategy. Ms. Shae Bradley Has Requested a Small Increase in Stimulant Medication. This May Be Beneficial and Was Discussed with Her Medical Team.    Diagnostic Impressions:  Breast Cancer  Cognitive Impairment  Depressive Disorder with Anxious Distress, Mild, Recurrent          Patient went for psychotherapy sessions.

## 2021-04-15 ENCOUNTER — TELEPHONE (OUTPATIENT)
Dept: ONCOLOGY | Age: 61
End: 2021-04-15

## 2021-04-16 ENCOUNTER — TELEPHONE (OUTPATIENT)
Dept: ONCOLOGY | Age: 61
End: 2021-04-16

## 2021-04-16 NOTE — TELEPHONE ENCOUNTER
Patient called and stated she would like to know how often she needs to get the bone density test done. Please advise and call patient back.      # [phone]

## 2021-04-16 NOTE — TELEPHONE ENCOUNTER
04/16/2021 at 11:36 am--This user called and spoke with the patient and verified patient ID [id number], I let her know our recommendation's and that she does need a follow up with us and that it can be virtually. I transferred patient up front to a PSR so that appointment can be made. Patient had no further question's or concerns at that time.

## 2021-04-20 ENCOUNTER — VIRTUAL VISIT (OUTPATIENT)
Dept: ONCOLOGY | Age: 61
End: 2021-04-20
Payer: COMMERCIAL

## 2021-04-20 DIAGNOSIS — Z85.3 HISTORY OF BREAST CANCER: Primary | ICD-10-CM

## 2021-04-20 DIAGNOSIS — Z79.4 TYPE 2 DIABETES MELLITUS WITH DIABETIC NEUROPATHY, WITH LONG-TERM CURRENT USE OF INSULIN (HCC): ICD-10-CM

## 2021-04-20 DIAGNOSIS — I25.10 CORONARY ARTERY DISEASE INVOLVING NATIVE HEART WITHOUT ANGINA PECTORIS, UNSPECIFIED VESSEL OR LESION TYPE: ICD-10-CM

## 2021-04-20 DIAGNOSIS — Z78.0 POSTMENOPAUSAL: ICD-10-CM

## 2021-04-20 DIAGNOSIS — E11.40 TYPE 2 DIABETES MELLITUS WITH DIABETIC NEUROPATHY, WITH LONG-TERM CURRENT USE OF INSULIN (HCC): ICD-10-CM

## 2021-04-20 PROCEDURE — 99213 OFFICE O/P EST LOW 20 MIN: CPT | Performed by: INTERNAL MEDICINE

## 2021-04-20 NOTE — PROGRESS NOTES
[address]  [address] [facility], [address] [place] [address] [address]  W: [phone]  F: [phone]     f/u HEME/ONC CONSULT      Reason for Visit:   Miguel Nicole is a 62 y.o. female who is seen by synchronous (real-time) audio-video technology for follow up of breast cancer      Referring physician: Dr. Dequan Hayes physician: Dr. Hiram Hutchinson, Dr. Rebecca [place]    HPI: Miguel Nicole is a 62 y.o.  female who I am seeing in f/u for management of breast cancer. She states she had a negative mammogram in 11/2013 and then felt a mass in her right breast in late 2/2014. Menarche at 16, first of 2 pregnancies at [address] [place] [address], menopause at 51, no history of HRT. FH + for her mother having breast cancer in her late 46s. No FH of ovarian cancer. Biopsy on 3/6/14 shows invasive mammary carcinoma, 0.7 cm, gr 3, ER + > 90%, SC + > 90%, HER 2 negative (ratio 1.13; sig/nuc 3.7). 3/26/14 SLN bx 4/7, 1.5 cm largest met, + CARLOS. S/p DD AC-T q 2 weeks x 4 for each (60/600/175 mg/m2), started 4/2/14. Completed AC 5/14/14, taxol 7/9/14. S/p bilateral mastectomies on 8/14/14, left breast negative, right breast no residual disease, 0/11 LN involved, pCR, RCB-0, [id number].    10/18/14 expanders removed due to infected left breast expander. XRT 11/17/14-12/24/14    Started letrozole- 1/1/2015-1/20/16 (joint pain)  Anastrozole 2/12/16- 4/6/16  Tamoxifen 4/25/2016-5/8/16   Exemestane 5/24/16-8/4/16 (joint pain)  toremefine 9/28/16-12/23/19    1/16/15 biopsy of right axilla was negative. 2v CABG on 1/3/20 with Dr. Katherine Cooler s/p MI    Interval history:  Still complains of ringing in her ears; complains of gaining weight      DX   Encounter Diagnoses   Name Primary?     History of breast cancer Yes    Type 2 diabetes mellitus with diabetic neuropathy, with long-term current use of insulin ([facility] [address]     Coronary artery disease involving native heart without angina pectoris, unspecified vessel or lesion type         Past Medical History:   Diagnosis Date    Cancer [facility])     R breast cancer dx 3/2014; chemo    Depression     Diabetes ([facility] [address]     Dyspepsia and other specified disorders of function of stomach     Hypercholesterolemia     MARISA (obstructive sleep apnea)     wears cpap    Other and unspecified hyperlipidemia     Psychiatric disorder     Stroke [facility]) 1980's    ?slight stroke due to oral contraceptives    Thyroid disease     Type II or unspecified type diabetes mellitus without mention of complication, uncontrolled     Unspecified hypothyroidism      Past Surgical History:   Procedure Laterality Date    HX BREAST RECONSTRUCTION  8/14/2014    BILATERAL BREAST RECONSTRUCTION WITH TISSUE EXPANDERS AND ALLODERM performed by Graciela Guerrero MD at [facility] MAIN OR    HX BREAST RECONSTRUCTION Bilateral 10/18/2014    BREAST TISSUE EXPANDER REMOVAL ( BILATERAL )  performed by [facility]iela Guerrero MD at [facility] MAIN OR    HX CERVICAL FUSION      Full ROM per pt    HX TONSILLECTOMY      HX VASCULAR ACCESS  3/26/14    adrian cath insertion    ME BREAST SURGERY PROCEDURE UNLISTED  2/26/14    excisionl bx of lymlh nodes    ME EXCISE HAND/FOOT NEUROMA      bilateral feet    ME SHOULDER SURG PROC UNLISTED      right     Social History     Socioeconomic History    Marital status: SINGLE     Spouse name: Not on file    Number of children: Not on file    Years of education: Not on file    Highest education level: Not on file   Tobacco Use    Smoking status: Former Smoker     Packs/day: 1.00     Years: 30.00     Pack years: 30.00    Smokeless tobacco: Never Used   Substance and Sexual Activity    Alcohol use: No    Drug use: No   Social History Narrative    Lives in [place] with 26 yo son and son's girlfriend. Has a 31 yo daughter.  for many years. Works as a  at an [employer] firm. Likes to amado.      Family History   Problem Relation Age of Onset    Diabetes Mother         type 2    Thyroid Disease Mother     Heart Disease Mother     Cancer Mother 61        breast & lung    Hypertension Mother     Diabetes Father         type 1     Diabetes Sister         borderline    Stroke Neg Hx        Current Outpatient Medications   Medication Sig Dispense Refill    pregabalin (LYRICA) 50 mg capsule TAKE 1 CAPSULE BY MOUTH THREE TIMES A DAY 90 Cap 1    nystatin (MYCOSTATIN) topical cream Apply 30 g to affected area two (2) times a day. 120 g 11    metoprolol succinate (TOPROL-XL) 50 mg XL tablet Take  by mouth daily.  aspirin delayed-release 81 mg tablet Take  by mouth daily.  clotrimazole (LOTRIMIN) 1 % topical cream Apply  to affected area two (2) times a day. 15 g 0    FARESTON 60 mg tab TAKE 1 TABLET BY MOUTH EVERY DAY 30 Tab 5    lidocaine (LIDODERM) 5 % APPLY PATCH EXTERNALLY TO AFFECTED AREA FOR 12 HOURS DAILY, THEN REMOVE FOR 12 HOURS 15 Patch 3    amphetamine-dextroamphetamine XR (ADDERALL XR) 20 mg XR capsule Take 15 mg by mouth daily.  ALPRAZolam (XANAX) 0.25 mg tablet TAKE 1 TABLET 3 TIMES A DAY AS NEEDED FOR ANXIETY  1    VICTOZA 2-DAKSHA 0.6 mg/0.1 mL (18 mg/3 mL) sub-q pen INJECT 1.2 MG ONCE A DAY SUBCUTANEOUSLY  3    levothyroxine (SYNTHROID) 200 mcg tablet Take 200 mcg by mouth Daily (before breakfast).  OTHER Breast Prosthesis  Mastectomy Bra    Dx: C50.919 1 Device 1    ACCU-CHEK COMPACT TEST strip   0    HUMULIN R U-500, CONC, KWIKPEN 500 unit/mL (3 mL) inpn   3    insulin CONCENTRATED regular (U-500 CONCENTRATED) 500 unit/mL soln by SubCUTAneous route.  FLUoxetine (PROZAC) 20 mg capsule Take 20 mg by mouth nightly.  rosuvastatin (CRESTOR) 10 mg tablet Take 10 mg by mouth nightly.  Insulin Needles, Disposable, (BD INSULIN PEN NEEDLE UF SHORT) 31 X 5/16 \" Ndle by Does Not Apply route two (2) times a day. 1 Package 11    diphenhydrAMINE (BENADRYL) 25 mg capsule Take 25 mg by mouth nightly as needed.          Allergies Allergen Reactions    Ceftin [Cefuroxime Axetil] Hives       Review of Systems    A comprehensive review of systems was performed and all systems were negative except for HPI     Objective:  Physical Exam:  There were no vitals taken for this visit. General: alert, cooperative, no distress   Mental  status: normal mood, behavior, speech, dress, motor activity, and thought processes, able to follow commands   HENT: NCAT   Neck: no visualized mass   Resp: no respiratory distress   Neuro: no gross deficits   Skin: no discoloration or lesions of concern on visible areas   Psychiatric: normal affect, consistent with stated mood, no evidence of hallucinations       Due to this being a TeleHealth evaluation (During XGURX-31 public health emergency), many elements of the physical examination are unable to be assessed. Evaluation of the following organ systems was limited: Vitals/Constitutional/EENT/Resp/CV/GI//MS/Neuro/Skin/Heme-Lymph-Imm. Diagnostic Imaging        Narrative   5/14/14 MAMMO  IMPRESSION: Stable mammogram. Next bilateral diagnostic Mammogram is   recommended in November, 2014. The patient was notified of these results. BIRADS 6: Known breast cancer. 3/20/14 CT c/a/p  IMPRESSION:   1. No evidence of metastatic disease. 2. Right breast mass with prominent but not pathologically enlarged right   axillary lymph nodes. 3. Hepatic steatosis.    4. Left renal cyst.       Bone scan:  Negative    3/24/14 dexa  Lumbar spine: L1-4   Bone mineral density (gm/cm2): 0.943   % of peak bone mass: 79   % for age matched controls: 78   T score: -2   Z score: -2.1   Hip: Right femoral neck   Bone mineral density (gm/cm2): 0.772   % of peak bone mass: 74   % for age matched controls: 78   T score: -1.9   Z score: -1.5   IMPRESSION:   This patient is osteopenic using the World Health Organization criteria   10 year probability of major osteoporotic fracture: 6.3%   10 year probability of hip fracture: 1.2%    3/16/15 LLE negative    5/8/15 brain MRI:  Normal    5/27/16 DEXA  Findings:     Femoral Neck:  Right  Bone mineral density (gm/cm2):? 0.831  % of peak bone mass: 80  % for age matched controls:? 80  T-score: -1.5  Z-score: -1.0     Total Hip: right  Bone mineral density (gm/cm2):  0.930  % of peak bone mass:   92  % for age matched controls:  80  T-score:   -0.6  Z-score:  -0.5     Lumbar Spine:  L1-4  Bone mineral density (gm/cm2):  0.984  % of peak bone mass:  83    % for age matched controls:  80  T-score:  -1.7  Z-score:  -1.7        IMPRESSION:     This patient is osteopenic using the World Health Organization criteria  10 year probability of major osteoporotic fracture:  5.8%  10 year probability of hip fracture:  0.4%    3/17/19 US chest  IMPRESSION: No acute abnormality is seen in the left neck or clavicular area of  palpable concern.          Lab Results  Lab Results   Component Value Date/Time    WBC 6.1 05/19/2017 01:22 PM    Hemoglobin (POC) 12.9 12/19/2016 02:46 PM    HGB 13.5 05/19/2017 01:22 PM    Hematocrit (POC) 38 12/19/2016 02:46 PM    HCT 38.7 05/19/2017 01:22 PM    PLATELET 049 64/09/9457 01:22 PM    MCV 86.6 05/19/2017 01:22 PM     Lab Results   Component Value Date/Time    Sodium 141 06/19/2017 02:51 PM    Potassium 3.8 06/19/2017 02:51 PM    Chloride 105 06/19/2017 02:51 PM    CO2 26 06/19/2017 02:51 PM    Anion gap 10 06/19/2017 02:51 PM    Glucose 210 (H) 06/19/2017 02:51 PM    BUN 10 06/19/2017 02:51 PM    Creatinine 0.85 06/19/2017 02:51 PM    BUN/Creatinine ratio 12 06/19/2017 02:51 PM    GFR est AA >60 06/19/2017 02:51 PM    GFR est non-AA >60 06/19/2017 02:51 PM    Calcium 8.5 06/19/2017 02:51 PM    Alk.  phosphatase 58 05/19/2017 01:22 PM    Protein, total 7.1 05/19/2017 01:22 PM    Albumin 3.6 06/19/2017 02:51 PM    Globulin 3.0 05/19/2017 01:22 PM    A-G Ratio 1.4 05/19/2017 01:22 PM    ALT (SGPT) 49 05/19/2017 01:22 PM     Assessment/Plan:  62 y.o. female with IMC gr 3, ER+, MO +, HER 2 negative. [id number]. 4/7 LN involved. pCR s/p neoadjuvant chemo  PS 0    1. Breast cancer stage: IIIA clinically    Hormonal therapy: administered    Based on her mother's history of breast cancer, it was reasonable to genetic test this patient. BRCA 1/2 negative, jero 2014. Discussed re-testing with NGS technology, she is agreeable    She was unable to tolerate letrozole or anastrozole or exemestane due to joint pain. Tamoxifen started on 4/25/16 and stopped on 5/8/16 due to labile mood and swelling. Last eye exam: 2020  Last gyn exam: 2020    No evidence of recurrence. Taking toremefine 60 mg daily. Recommend 10 years of endocrine therapy since she was LN +. She has been off since Dec 2019, and would like to remain off. Will graduate her to care with her PCP. 2. Depression/anxiety: stable, no SI/HI; on Prozac 30 mg. Pt is currently attending breast cancer support group. Sees Dr. Aguilar Pickering at [facility]; on adderall    3. Osteopenia:  We previously discussed the data from [address] [place] [address], [facility] 2013, for the meta-analysis for adjuvant bisphosphonate use in breast cancer. We discussed that in postmenopausal women, it appears that the bisphosphate zolendronic acid, given as in ABCSG 12 at 4 mg IV q 6 months x 3 years, is beneficial in improving bone DFS and OS. We discussed side effects such as ONJ and the need to avoid invasive dental procedures while on these medications, renal damage, and hypocalcemia. Last dose, #6, 6/19/17    dexa ordered. Recommend continuing this every 2 years at this time, pending her genetic re-testing above    4. Neuropathy: ongoing, followed by Dr. Harriet Purcell at [facility], Lyrica to 50 mg TID. She is diabetic. At this point neuropathy worsening is not due to chemotherapy, she is following with her Endocrinologist. Also, has carpel tunnel, advise to wear splints    5. B12 def: B12 injections.        6. Severe obesity:  She is exercising; discussed counting calories using an mikayla    7. DM2, on insulin:  Insulin; with neuropathy; now on insulin pump    8. CAD:  S/p CABG    9. Buzzing in ears bilaterally:  Brain MRI negative on 5/18/20; saw ENT     The patient was evaluated through a synchronous (real-time) audio-video encounter. The patient (or guardian if applicable) is aware that this is a billable service. Verbal consent to proceed has been obtained within the past 12 months. The visit was conducted pursuant to the emergency declaration under the [address] [place] [address] authority and the [employer] General Act. Patient identification was verified, and a caregiver was present when appropriate. The patient was located in a state where the provider was credentialed to provide care.           Nuvia Hanna MD

## 2021-04-30 ENCOUNTER — TELEPHONE (OUTPATIENT)
Dept: ONCOLOGY | Age: 61
End: 2021-04-30

## 2021-04-30 NOTE — TELEPHONE ENCOUNTER
04/30/2021 at 3:45 pm--This user received a message from one of the nurses downstairs in the infusion center stating that patient did not show up for her 1:00 lab appointment. I called and spoke with patient and verified patient ID [id number] and inquired if patient knew she was supposed to come in today for blood work for [employer] Patient stated yes she did and that she had called early this morning to actually cancel the appointment because she could not come in at 1:00 pm and wanted to move it out a couple hours and stated that she did not remember who she spoke to but stated that person typed the message up while she was on the phone with her in regards to canceling the appointment. I let patient know I was sorry for the confusion but that we were not aware of this and I did not see any messages from anyone. Patient wanted to know if someone could reschedule It for next week and I let her know that I will have someone call her to do so. Patient had no further question's or concerns at that time.

## 2021-05-06 ENCOUNTER — HOSPITAL ENCOUNTER (OUTPATIENT)
Dept: INFUSION THERAPY | Age: 61
Discharge: HOME | End: 2021-05-06

## 2021-05-06 VITALS
TEMPERATURE: 96.2 F | SYSTOLIC BLOOD PRESSURE: 122 MMHG | RESPIRATION RATE: 16 BRPM | HEART RATE: 94 BPM | DIASTOLIC BLOOD PRESSURE: 57 MMHG

## 2021-05-06 NOTE — PROGRESS NOTES
Outpatient Infusion Center Short Visit Progress Note    Patient admitted to [facility] for genetic testing labs ambulatory in stable condition. Vital Signs:  Visit Vitals  BP (!) 122/57   Pulse 94   Temp (!) 96.2 °F (35.7 °C)   Resp 16       Left arm with positive blood return. Labs  Picked up by Patti Cruz NP. Patient tolerated treatment well. Patient discharged from [place] [address] ambulatory in no distress. Patient aware of next appointment.     Creed Heimlich, RN    Future Appointments   Date Time Provider Vielka Ly   5/12/2021  8:30 AM [facility] [id number]    5/21/2021  1:40 PM Peng CRUM PsyD NEUROWTC BS AMB   6/4/2021 10:00 AM WTC TANYA/NAPOLEON [facility]

## 2021-05-07 ENCOUNTER — DOCUMENTATION ONLY (OUTPATIENT)
Dept: ONCOLOGY | Age: 61
End: 2021-05-07

## 2021-05-07 NOTE — PROGRESS NOTES
5/7/21 4:47 PM: Janna Mcguire blood specimen prepared for shipment. [facility][note 3] tracking number [id number].

## 2021-05-12 ENCOUNTER — HOSPITAL ENCOUNTER (OUTPATIENT)
Dept: CARDIAC REHAB | Age: 61
Discharge: HOME | End: 2021-05-12
Payer: COMMERCIAL

## 2021-05-12 PROCEDURE — 93798 PHYS/QHP OP CAR RHAB W/ECG: CPT

## 2021-05-14 ENCOUNTER — HOSPITAL ENCOUNTER (OUTPATIENT)
Dept: CARDIAC REHAB | Age: 61
Discharge: HOME | End: 2021-05-14
Payer: COMMERCIAL

## 2021-05-14 VITALS — WEIGHT: 223.2 LBS | BODY MASS INDEX: 39.54 KG/M2

## 2021-05-14 VITALS — HEIGHT: 63 IN | BODY MASS INDEX: 39.19 KG/M2 | WEIGHT: 221.2 LBS

## 2021-05-14 PROCEDURE — 93798 PHYS/QHP OP CAR RHAB W/ECG: CPT

## 2021-05-14 NOTE — CARDIO/PULMONARY
[address] Cardiac Rehab Bill James  1960 presented for cardiac rehab orientation and exercise tolerance test today with a primary diagnosis of CABG in 2020. Pt's surgery took place at [facility].  Pt attended some sessions of Cardiac Rehab at [facility], however, due to [facility] [address] rehab closure, pt did not complete. Pt now presents to [facility] to participate. Pt has no previous cardiac history. LVEF is 55% via ECHO in 2019 (prior to her 202 CABG) . Cardiac risk factors include: HTN, HLD, DM,  obesity, and sedentary lifestyle. CAD risk factors were reviewed with patient. Pt is single and lives with her son. Works f/t as an  Depression score PHQ9 is 14 and this is considered high. The result was discussed with patient who affirms score to be accurate. Pt is under the care of a physician for anxiety and depression. Pt also states she is looking forward to participating in cardiac rehab and feels she will have more energy and sleep better. ) Patient denied chest pain or SOB during 6 minute walk on treadmill at 1.7 mph, with RPE 12. Cardiac rhythm was SR/ST without ectopy. Exercise plan was developed. Pt will attend cardiac rehab 2-3 times per week. Cardiac Rehab book reviewed and given to patient.   
Terrell Bliss RN

## 2021-05-17 ENCOUNTER — HOSPITAL ENCOUNTER (OUTPATIENT)
Dept: CARDIAC REHAB | Age: 61
Discharge: HOME | End: 2021-05-17
Payer: COMMERCIAL

## 2021-05-17 VITALS — BODY MASS INDEX: 39.36 KG/M2 | WEIGHT: 222.2 LBS

## 2021-05-17 PROCEDURE — 93798 PHYS/QHP OP CAR RHAB W/ECG: CPT

## 2021-05-19 ENCOUNTER — HOSPITAL ENCOUNTER (OUTPATIENT)
Dept: CARDIAC REHAB | Age: 61
Discharge: HOME | End: 2021-05-19
Payer: COMMERCIAL

## 2021-05-19 ENCOUNTER — TELEPHONE (OUTPATIENT)
Dept: ONCOLOGY | Age: 61
End: 2021-05-19

## 2021-05-19 VITALS — WEIGHT: 222 LBS | BODY MASS INDEX: 39.33 KG/M2

## 2021-05-19 PROCEDURE — 93798 PHYS/QHP OP CAR RHAB W/ECG: CPT

## 2021-05-19 NOTE — TELEPHONE ENCOUNTER
5/19/21 4:40 PM: Called patient and advised that her Hawa Achilles genetic testing was negative. Patient verbalized understanding.

## 2021-05-20 ENCOUNTER — APPOINTMENT (OUTPATIENT)
Dept: CARDIAC REHAB | Age: 61
End: 2021-05-20
Payer: COMMERCIAL

## 2021-05-21 ENCOUNTER — OFFICE VISIT (OUTPATIENT)
Dept: NEUROLOGY | Age: 61
End: 2021-05-21
Payer: COMMERCIAL

## 2021-05-21 DIAGNOSIS — R41.840 ATTENTION DEFICIT: ICD-10-CM

## 2021-05-21 DIAGNOSIS — R41.89 COGNITIVE DECLINE: ICD-10-CM

## 2021-05-21 DIAGNOSIS — R47.89 WORD FINDING DIFFICULTY: ICD-10-CM

## 2021-05-21 DIAGNOSIS — R41.3 MEMORY LOSS: ICD-10-CM

## 2021-05-21 DIAGNOSIS — Z85.3 HISTORY OF BREAST CANCER: ICD-10-CM

## 2021-05-21 DIAGNOSIS — F41.8 ANXIETY ABOUT HEALTH: ICD-10-CM

## 2021-05-21 DIAGNOSIS — G31.84 MILD COGNITIVE IMPAIRMENT: Primary | ICD-10-CM

## 2021-05-21 DIAGNOSIS — F32.A CHRONIC DEPRESSION: ICD-10-CM

## 2021-05-21 PROCEDURE — 90791 PSYCH DIAGNOSTIC EVALUATION: CPT | Performed by: CLINICAL NEUROPSYCHOLOGIST

## 2021-05-21 NOTE — PROGRESS NOTES
[address] [facility],[address]  [address]   [place] [address] [facility] [address]   [phone] Office   [phone] Fax      Neuropsychology    Initial Diagnostic Interview Note      Referral:  Nohemi Hanks NP    Carrington Oppenheim is a 62 y.o. right handed single  female who was unaccompanied to the initial clinical interview on 5/21/21 . Please refer to her medical records for details pertaining to her history. At the start of the appointment, I reviewed the patient's [facility] Epic Chart (including Media scanned in from previous providers) for the active Problem List, all pertinent Past Medical Hx, medications, recent radiologic and laboratory findings. In addition, I reviewed pt's documented Immunization Record and Encounter History. She  has a past medical history of Cancer ([facility] [address] Depression, Diabetes ([address] Dyspepsia and other specified disorders of function of stomach, Hypercholesterolemia, MARISA (obstructive sleep apnea), Other and unspecified hyperlipidemia, Psychiatric disorder, Stroke ([address] (1980's), Thyroid disease, Type II or unspecified type diabetes mellitus without mention of complication, uncontrolled, and Unspecified hypothyroidism who since 2015, after having chemotherapy for breast CA,  noted memory issues. She is a high school graduate and completed some college level courses without history of learning disability to formal diagnosed issues. She works as an  for a [address] [place] [address]. She works a lot. She has noticed a progressive decline in short. In 2014, she had breast cancer. She has had chemo, double mastectomy, and expanders went in which got infected, and radiation. After the chemo is when she started noticing problems and decline. Oncologist sent her to [facility] and she had her first eval followed by second in 2019 (see below). She does monthly B12 shots.   She was put on Adderall which continues to be helpful. However, memory and speech issues are worsening. Maybe even 6-8 months ago started noticing more and more issues. January 2020 had double bypass done open heart. Forgot she didn't let the dogs out. Making a lot of mistakes at work - even when working from home. Today left home with two different shoes on. Worried she has dementia. Wants to laugh it off but cries. Uses CPAP         EXAM: MRI BRAIN WO CONT     INDICATION: memory issue and gait disturbance; evaluate for stroke     COMPARISON: MRI brain 5/18/2020.     CONTRAST: None.     TECHNIQUE:    Multiplanar multisequence acquisition without contrast of the brain.     FINDINGS:  The ventricles are normal in size and position. Stable minimal scattered  nonspecific T2/FLAIR white matter hyperintensities, predominantly in the frontal  lobes. The brain parenchyma otherwise has normal signal characteristics. There  is no acute infarct, hemorrhage, extra-axial fluid collection, or mass effect. There is no cerebellar tonsillar herniation. Expected arterial flow-voids are  present.     Minimal mucosal thickening in the left maxillary sinus. The mastoid air cells  and middle ears are clear. The orbital contents are within normal limits. No  significant osseous or scalp lesions are identified.     IMPRESSION     1. No acute or significant intracranial abnormality. Stable minimal scattered  nonspecific T2/FLAIR white matter hyperintensities. Previous neuropsych by another provider in 2019:  Recent results suggest low average frontal lobe functioning. Ms. Andrade Herrera continues to demonstrate some slow processing however, her working memory and divided attention were all nicely within the average range.   Inattention during lengthy test remains a weakness.     With respect to visual-spatial functioning, scores have remained in the low average to average.     Language function functioning demonstrated intact communication and word finding yet slow fluency at times.     Memory continues to be most concerning. When looking across her previous evaluations, she appears to have variable learning and recall performance. Despite improvements of the last evaluation, her scores once again dropped to the impaired below average range. Ms. Karol Wisdom has difficulty starting large amount of information as her brain quickly becomes overwhelming. Repetition Is Beneficial and Assist with Storage.     Ms. Ramos Crumble to Meet the Diagnostic Criteria for Cognitive Impairment. Although Many of Her Scores Hold Were in the Low Average Range, Her Memory Performance Is Again Dropped. This Parallels Her Ongoing Concern Reports of Variable Capability with Recall. Ms. Karol Wisdom Has Difficulty Storing Large Amount of Information As Her Brain Quickly Becomes Overwhelming Cups. Repetition Is Beneficial and Assists with Storage This Should Continue to Be Her Go to Strategy. Ms. Karol Wisdom Has Requested a Small Increase in Stimulant Medication. This May Be Beneficial and Was Discussed with Her Medical Team.     Diagnostic Impressions:  Breast Cancer  Cognitive Impairment  Depressive Disorder with Anxious Distress, Mild, Recurrent          Neuropsychological Mental Status Exam (NMSE):      Historian: Good  Praxis: No UE apraxia  R/L Orientation: Intact to self and to other  Dress: within normal limits   Weight: Overweight  Appearance/Hygiene: within normal limits   Gait: within normal limits   Assistive Devices: Glasses  Mood: within normal limits   Affect: within normal limits   Comprehension: within normal limits   Thought Process: within normal limits   Expressive Language:  Word finding problems  Receptive Language: within normal limits   Motor:  No cognitive or motor perseveration  ETOH:  Tobacco:  Illicit:  SI/HI:  Psychosis:  Insight: Within normal limits  Judgment: Within normal limits  Other Psych:      Past Medical History:   Diagnosis Date    CAD (coronary artery disease)     CABG 1/3/2020    Cancer [facility])     R breast cancer dx 3/2014; chemo    Depression     Diabetes ([facility] [address]     Dyspepsia and other specified disorders of function of stomach     Hypercholesterolemia     MARISA (obstructive sleep apnea)     wears cpap    Other and unspecified hyperlipidemia     Psychiatric disorder     Stroke [facility]) 1980's    ?slight stroke due to oral contraceptives    Thyroid disease     Type II or unspecified type diabetes mellitus without mention of complication, uncontrolled     Unspecified hypothyroidism        Past Surgical History:   Procedure Laterality Date    HX BREAST RECONSTRUCTION  8/14/2014    BILATERAL BREAST RECONSTRUCTION WITH TISSUE EXPANDERS AND ALLODERM performed by Alexis Staples MD at [facility] MAIN OR    HX BREAST RECONSTRUCTION Bilateral 10/18/2014    BREAST TISSUE EXPANDER REMOVAL ( BILATERAL )  performed by Alexis Staples MD at [facility] MAIN OR    HX CERVICAL FUSION      Full ROM per pt    HX TONSILLECTOMY      HX VASCULAR ACCESS  3/26/14    adrian cath insertion    [place] BREAST SURGERY PROCEDURE UNLISTED  2/26/14    excisionl bx of lymlh nodes    [place] CARDIAC SURG PROCEDURE UNLIST      CABG 1/3/2020    [place] EXCISE HAND/FOOT NEUROMA      bilateral feet    [place] SHOULDER SURG PROC UNLISTED      right       Allergies   Allergen Reactions    Ceftin [Cefuroxime Axetil] Hives       Family History   Problem Relation Age of Onset    Diabetes Mother         type 2    Thyroid Disease Mother     Heart Disease Mother     Cancer Mother 61        breast & lung    Hypertension Mother     Diabetes Father         type 1     Diabetes Sister         borderline    Stroke Neg Hx        Social History     Tobacco Use    Smoking status: Former Smoker     Packs/day: 1.00     Years: 30.00     Pack years: 30.00    Smokeless tobacco: Never Used   Substance Use Topics    Alcohol use: No    Drug use: No       Current Outpatient Medications   Medication Sig Dispense Refill    pregabalin (LYRICA) 50 mg capsule TAKE 1 CAPSULE BY MOUTH THREE TIMES A DAY 90 Cap 1    nystatin (MYCOSTATIN) topical cream Apply 30 g to affected area two (2) times a day. 120 g 11    metoprolol succinate (TOPROL-XL) 50 mg XL tablet Take  by mouth daily.  aspirin delayed-release 81 mg tablet Take  by mouth daily.  clotrimazole (LOTRIMIN) 1 % topical cream Apply  to affected area two (2) times a day. 15 g 0    FARESTON 60 mg tab TAKE 1 TABLET BY MOUTH EVERY DAY 30 Tab 5    lidocaine (LIDODERM) 5 % APPLY PATCH EXTERNALLY TO AFFECTED AREA FOR 12 HOURS DAILY, THEN REMOVE FOR 12 HOURS 15 Patch 3    amphetamine-dextroamphetamine XR (ADDERALL XR) 20 mg XR capsule Take 15 mg by mouth daily.  ALPRAZolam (XANAX) 0.25 mg tablet TAKE 1 TABLET 3 TIMES A DAY AS NEEDED FOR ANXIETY  1    VICTOZA 2-DAKSHA 0.6 mg/0.1 mL (18 mg/3 mL) sub-q pen INJECT 1.2 MG ONCE A DAY SUBCUTANEOUSLY  3    levothyroxine (SYNTHROID) 200 mcg tablet Take 200 mcg by mouth Daily (before breakfast).  OTHER Breast Prosthesis  Mastectomy Bra    Dx: C50.919 1 Device 1    ACCU-CHEK COMPACT TEST strip   0    HUMULIN R U-500, CONC, KWIKPEN 500 unit/mL (3 mL) inpn   3    insulin CONCENTRATED regular (U-500 CONCENTRATED) 500 unit/mL soln by SubCUTAneous route.  FLUoxetine (PROZAC) 20 mg capsule Take 20 mg by mouth nightly.  rosuvastatin (CRESTOR) 10 mg tablet Take 10 mg by mouth nightly.  Insulin Needles, Disposable, (BD INSULIN PEN NEEDLE UF SHORT) 31 X 5/16 \" Ndle by Does Not Apply route two (2) times a day. 1 Package 11    diphenhydrAMINE (BENADRYL) 25 mg capsule Take 25 mg by mouth nightly as needed. Plan:  Obtain authorization for testing from insurance company. Report to follow once testing, scoring, and interpretation completed. ? Organic based neurocognitive issues versus mood disorder or combination of same. ? Problems organic, functional, or both?  This note will not be viewable in MyChart.

## 2021-05-24 ENCOUNTER — APPOINTMENT (OUTPATIENT)
Dept: CARDIAC REHAB | Age: 61
End: 2021-05-24
Payer: COMMERCIAL

## 2021-05-26 ENCOUNTER — APPOINTMENT (OUTPATIENT)
Dept: CARDIAC REHAB | Age: 61
End: 2021-05-26
Payer: COMMERCIAL

## 2021-05-28 ENCOUNTER — APPOINTMENT (OUTPATIENT)
Dept: CARDIAC REHAB | Age: 61
End: 2021-05-28
Payer: COMMERCIAL

## 2021-06-02 ENCOUNTER — APPOINTMENT (OUTPATIENT)
Dept: CARDIAC REHAB | Age: 61
End: 2021-06-02

## 2021-06-04 ENCOUNTER — APPOINTMENT (OUTPATIENT)
Dept: CARDIAC REHAB | Age: 61
End: 2021-06-04

## 2021-06-04 ENCOUNTER — HOSPITAL ENCOUNTER (OUTPATIENT)
Dept: MAMMOGRAPHY | Age: 61
Discharge: HOME | End: 2021-06-04
Attending: INTERNAL MEDICINE
Payer: COMMERCIAL

## 2021-06-04 DIAGNOSIS — Z78.0 POSTMENOPAUSAL: ICD-10-CM

## 2021-06-04 PROCEDURE — 77080 DXA BONE DENSITY AXIAL: CPT

## 2021-06-04 NOTE — CARDIO/PULMONARY
[address] Cardiac Rehab Chase Rodriguez 62 y.o. With diagnosis of CABG attended phase II cardiac rehab for Intake visit and 3 exercise sessions. Pt requested to be discharged. She stated that she has too much going on and attending is creating stress for her. Communication, as to the benefits of cardiac rehab, attempted, however, pt stated she wanted to be discharged. Sydnee Randolph RN 
6/4/2021

## 2021-06-07 ENCOUNTER — APPOINTMENT (OUTPATIENT)
Dept: CARDIAC REHAB | Age: 61
End: 2021-06-07

## 2021-06-09 ENCOUNTER — APPOINTMENT (OUTPATIENT)
Dept: CARDIAC REHAB | Age: 61
End: 2021-06-09

## 2021-06-10 ENCOUNTER — APPOINTMENT (OUTPATIENT)
Dept: CARDIAC REHAB | Age: 61
End: 2021-06-10

## 2021-06-18 NOTE — TELEPHONE ENCOUNTER
From: Will Rape  To: Martha Russell MD  Sent: 11/30/2017 1:23 PM EST  Subject: Non-Urgent Medical Question    Raghav,  The pain in my right side shoulder (front chest area, back and under) and arm that shoots down my arm and is very painful if I take a deep breathe, is still there. It got a bit better and back again. Still taking Aleve and Flexeril as needed. Anything to worry about that it keeps going?     Titi Orellanajosé miguel  [phone] Patient tolerated procedure well.

## 2021-08-04 ENCOUNTER — OFFICE VISIT (OUTPATIENT)
Dept: NEUROLOGY | Age: 61
End: 2021-08-04
Payer: COMMERCIAL

## 2021-08-04 DIAGNOSIS — F32.2 SEVERE MAJOR DEPRESSION (HCC): ICD-10-CM

## 2021-08-04 DIAGNOSIS — R41.840 ATTENTION DEFICIT: ICD-10-CM

## 2021-08-04 DIAGNOSIS — F41.8 ANXIETY WITH SOMATIC FEATURES: ICD-10-CM

## 2021-08-04 DIAGNOSIS — G31.84 MILD COGNITIVE IMPAIRMENT: Primary | ICD-10-CM

## 2021-08-04 DIAGNOSIS — Z85.3 HISTORY OF BREAST CANCER: ICD-10-CM

## 2021-08-04 PROCEDURE — 96133 NRPSYC TST EVAL PHYS/QHP EA: CPT | Performed by: CLINICAL NEUROPSYCHOLOGIST

## 2021-08-04 PROCEDURE — 96139 PSYCL/NRPSYC TST TECH EA: CPT | Performed by: CLINICAL NEUROPSYCHOLOGIST

## 2021-08-04 PROCEDURE — 96137 PSYCL/NRPSYC TST PHY/QHP EA: CPT | Performed by: CLINICAL NEUROPSYCHOLOGIST

## 2021-08-04 PROCEDURE — 96136 PSYCL/NRPSYC TST PHY/QHP 1ST: CPT | Performed by: CLINICAL NEUROPSYCHOLOGIST

## 2021-08-04 PROCEDURE — 96138 PSYCL/NRPSYC TECH 1ST: CPT | Performed by: CLINICAL NEUROPSYCHOLOGIST

## 2021-08-04 PROCEDURE — 96132 NRPSYC TST EVAL PHYS/QHP 1ST: CPT | Performed by: CLINICAL NEUROPSYCHOLOGIST

## 2021-08-04 NOTE — LETTER
8/9/2021    Patient: Lalitha aTlbot   YOB: 1960   Date of Visit: 8/4/2021     Sherin Hall NP  [facility] [id number]  Via Fax: [phone]    Dear hSerin Hall NP,      Thank you for referring Ms. Jeanna Flores to [facility] for evaluation. My notes for this consultation are attached. If you have questions, please do not hesitate to call me. I look forward to following your patient along with you.       Sincerely,    Michael Crespo PsyD

## 2021-08-09 NOTE — PROGRESS NOTES
[address] [facility],[address]Th [address]  [address]   [place] [address] [facility]   [address]   [phone] Office   [phone] Fax      Neuropsychological Evaluation Report  Referral:  Godfrey Alicea, CARLOS    Max Rocha is a 62 y.o. right handed single  female who was unaccompanied to the initial clinical interview on 5/21/21 . Please refer to her medical records for details pertaining to her history. At the start of the appointment, I reviewed the patient's [facility] Epic Chart (including Media scanned in from previous providers) for the active Problem List, all pertinent Past Medical Hx, medications, recent radiologic and laboratory findings. In addition, I reviewed pt's documented Immunization Record and Encounter History. She  has a past medical history of Cancer ([facility] [address] Depression, Diabetes ([facility] [address] Dyspepsia and other specified disorders of function of stomach, Hypercholesterolemia, MARISA (obstructive sleep apnea), Other and unspecified hyperlipidemia, Psychiatric disorder, Stroke ([address] (1980's), Thyroid disease, Type II or unspecified type diabetes mellitus without mention of complication, uncontrolled, and Unspecified hypothyroidism who since 2015, after having chemotherapy for breast CA,  noted memory issues. She is a high school graduate and completed some college level courses without history of learning disability to formal diagnosed issues. She works as an  for a [address] [place] [address]. She works a lot. She has noticed a progressive decline in short. In 2014, she had breast cancer. She has had chemo, double mastectomy, and expanders went in which got infected, and radiation. After the chemo is when she started noticing problems and decline. Oncologist sent her to [address] [place] [address] and she had her first eval followed by second in 2019 (see below). She does monthly B12 shots.   She was put on Adderall which continues to be helpful. However, memory and speech issues are worsening. Maybe even 6-8 months ago started noticing more and more issues. January 2020 had double bypass done open heart. Forgot she didn't let the dogs out. Making a lot of mistakes at work - even when working from home. Today left home with two different shoes on. Worried she has dementia. Wants to laugh it off but cries. Uses CPAP         EXAM: MRI BRAIN WO CONT     INDICATION: memory issue and gait disturbance; evaluate for stroke     COMPARISON: MRI brain 5/18/2020.     CONTRAST: None.     TECHNIQUE:    Multiplanar multisequence acquisition without contrast of the brain.     FINDINGS:  The ventricles are normal in size and position. Stable minimal scattered  nonspecific T2/FLAIR white matter hyperintensities, predominantly in the frontal  lobes. The brain parenchyma otherwise has normal signal characteristics. There  is no acute infarct, hemorrhage, extra-axial fluid collection, or mass effect. There is no cerebellar tonsillar herniation. Expected arterial flow-voids are  present.     Minimal mucosal thickening in the left maxillary sinus. The mastoid air cells  and middle ears are clear. The orbital contents are within normal limits. No  significant osseous or scalp lesions are identified.     IMPRESSION     1. No acute or significant intracranial abnormality. Stable minimal scattered  nonspecific T2/FLAIR white matter hyperintensities. Previous neuropsych by another provider in 2019:  Recent results suggest low average frontal lobe functioning. Ms. Abhijeet Bullock continues to demonstrate some slow processing however, her working memory and divided attention were all nicely within the average range.   Inattention during lengthy test remains a weakness.     With respect to visual-spatial functioning, scores have remained in the low average to average.     Language function functioning demonstrated intact communication and word finding yet slow fluency at times.     Memory continues to be most concerning. When looking across her previous evaluations, she appears to have variable learning and recall performance. Despite improvements of the last evaluation, her scores once again dropped to the impaired below average range. Ms. Martin Jacobo has difficulty starting large amount of information as her brain quickly becomes overwhelming. Repetition Is Beneficial and Assist with Storage.     Ms. Waller Laws to Meet the Diagnostic Criteria for Cognitive Impairment. Although Many of Her Scores Hold Were in the Low Average Range, Her Memory Performance Is Again Dropped. This Parallels Her Ongoing Concern Reports of Variable Capability with Recall. Ms. Martin Jacobo Has Difficulty Storing Large Amount of Information As Her Brain Quickly Becomes Overwhelming Cups. Repetition Is Beneficial and Assists with Storage This Should Continue to Be Her Go to Strategy. Ms. Martin Jacobo Has Requested a Small Increase in Stimulant Medication. This May Be Beneficial and Was Discussed with Her Medical Team.     Diagnostic Impressions:  Breast Cancer  Cognitive Impairment  Depressive Disorder with Anxious Distress, Mild, Recurrent          Neuropsychological Mental Status Exam (NMSE):      Historian: Good  Praxis: No UE apraxia  R/L Orientation: Intact to self and to other  Dress: within normal limits   Weight: Overweight  Appearance/Hygiene: within normal limits   Gait: within normal limits   Assistive Devices: Glasses  Mood: within normal limits   Affect: within normal limits   Comprehension: within normal limits   Thought Process: within normal limits   Expressive Language:  Word finding problems  Receptive Language: within normal limits   Motor:  No cognitive or motor perseveration  ETOH: Denied  Tobacco: Denied  Illicit: Denied  SI/HI: Denied  Psychosis: Denied  Insight: Within normal limits  Judgment: Within normal limits  Other Psych:      Past Medical History:   Diagnosis Date    CAD (coronary artery disease)     CABG 1/3/2020    Cancer [facility])     R breast cancer dx 3/2014; chemo    Depression     Diabetes ([facility] [address]     Dyspepsia and other specified disorders of function of stomach     Hypercholesterolemia     MARISA (obstructive sleep apnea)     wears cpap    Other and unspecified hyperlipidemia     Psychiatric disorder     Stroke [facility]) 1980's    ?slight stroke due to oral contraceptives    Thyroid disease     Type II or unspecified type diabetes mellitus without mention of complication, uncontrolled     Unspecified hypothyroidism        Past Surgical History:   Procedure Laterality Date    HX BREAST RECONSTRUCTION  8/14/2014    BILATERAL BREAST RECONSTRUCTION WITH TISSUE EXPANDERS AND ALLODERM performed by Tania Moser MD at [facility] MAIN OR    HX BREAST RECONSTRUCTION Bilateral 10/18/2014    BREAST TISSUE EXPANDER REMOVAL ( BILATERAL )  performed by Tania Moser MD at [facility] MAIN OR    HX CERVICAL FUSION      Full ROM per pt    HX TONSILLECTOMY      HX VASCULAR ACCESS  3/26/14    adrian cath insertion    CA BREAST SURGERY PROCEDURE UNLISTED  2/26/14    excisionl bx of lymlh nodes    CA CARDIAC SURG PROCEDURE UNLIST      CABG 1/3/2020    CA EXCISE HAND/FOOT NEUROMA      bilateral feet    CA SHOULDER SURG PROC UNLISTED      right       Allergies   Allergen Reactions    Ceftin [Cefuroxime Axetil] Hives       Family History   Problem Relation Age of Onset    Diabetes Mother         type 2    Thyroid Disease Mother     Heart Disease Mother     Cancer Mother 61        breast & lung    Hypertension Mother     Diabetes Father         type 1     Diabetes Sister         borderline    Stroke Neg Hx        Social History     Tobacco Use    Smoking status: Former Smoker     Packs/day: 1.00     Years: 30.00     Pack years: 30.00    Smokeless tobacco: Never Used   Substance Use Topics    Alcohol use: No    Drug use: No       Current Outpatient Medications   Medication Sig Dispense Refill    pregabalin (LYRICA) 50 mg capsule TAKE 1 CAPSULE BY MOUTH THREE TIMES A DAY 90 Cap 1    metoprolol succinate (TOPROL-XL) 50 mg XL tablet Take  by mouth daily.  aspirin delayed-release 81 mg tablet Take  by mouth daily.  clotrimazole (LOTRIMIN) 1 % topical cream Apply  to affected area two (2) times a day. 15 g 0    FARESTON 60 mg tab TAKE 1 TABLET BY MOUTH EVERY DAY 30 Tab 5    lidocaine (LIDODERM) 5 % APPLY PATCH EXTERNALLY TO AFFECTED AREA FOR 12 HOURS DAILY, THEN REMOVE FOR 12 HOURS 15 Patch 3    amphetamine-dextroamphetamine XR (ADDERALL XR) 20 mg XR capsule Take 15 mg by mouth daily.  ALPRAZolam (XANAX) 0.25 mg tablet TAKE 1 TABLET 3 TIMES A DAY AS NEEDED FOR ANXIETY  1    VICTOZA 2-DAKSHA 0.6 mg/0.1 mL (18 mg/3 mL) sub-q pen INJECT 1.2 MG ONCE A DAY SUBCUTANEOUSLY  3    levothyroxine (SYNTHROID) 200 mcg tablet Take 200 mcg by mouth Daily (before breakfast).  OTHER Breast Prosthesis  Mastectomy Bra    Dx: C50.919 1 Device 1    ACCU-CHEK COMPACT TEST strip   0    HUMULIN R U-500, CONC, KWIKPEN 500 unit/mL (3 mL) inpn   3    insulin CONCENTRATED regular (U-500 CONCENTRATED) 500 unit/mL soln by SubCUTAneous route.  FLUoxetine (PROZAC) 20 mg capsule Take 20 mg by mouth nightly.  rosuvastatin (CRESTOR) 10 mg tablet Take 10 mg by mouth nightly.  Insulin Needles, Disposable, (BD INSULIN PEN NEEDLE UF SHORT) 31 X 5/16 \" Ndle by Does Not Apply route two (2) times a day. 1 Package 11    diphenhydrAMINE (BENADRYL) 25 mg capsule Take 25 mg by mouth nightly as needed. Plan:  Obtain authorization for testing from insurance company. Report to follow once testing, scoring, and interpretation completed. ? Organic based neurocognitive issues versus mood disorder or combination of same. ? Problems organic, functional, or both? This note will not be viewable in [address]       Neuropsychological Evaluation  Patient Testing 8/4/ 21 Report Completed 8/9/21  A Psychometrist Assisted w/ portions of this evaluation while under my direct supervision    Please refer to the patient's initial interview progress note (copied above) and her medical records for details pertaining to her history. Today's neuropsychological test scores follow: The following assessment procedures were performed:      Neuropsychologist Performed, Interpreted, & Reported: Neuropsychological Mental Status Exam, Revised Memory & Behavior Checklist, Mini Mental State Exam, Clock Drawing Test, Test Of Premorbid Functioning, Jhoan-Melzack Pain Questionnaire,  History Taking  & Clinical Interview With The Patient, Additional History Taking w/ The Patient's Spouse, MICKEY, CPT-III, Review Of Available Records. Psychometrist Administered & Neuropsychologist Interpreted & Neuropsychologist Reported: Finger Tapping Test, Grooved Pegboard Test, Speech-Sounds Perception Test, [employer], Wechsler Adult Intelligence Scale - IV, Verbal Fluency Tests, Emmanuel & Emmanuel - Revised, Trailmaking Test Parts A & B, [place] Verbal Learning Test - 3, Jarvis Complex Figure Test, Law Depression Inventory - II, Law Anxiety Inventory,. Test Findings:  Note:  The patients raw data have been compared with currently available norms which include demographic corrections for age, gender, and/or education. Sometimes, the patients scores are compared to demographically similar individuals as close to the patients age, education level, etc., as possible. \"Average\" is viewed as being +/- 1 standard deviation (SD) from the stated mean for a particular test score. \"Low average\" is viewed as being between 1 and 2 SD below the mean, and above average is viewed as being 1 and 2 SD above the mean. Scores falling in the borderline range (between 1-1/2 and 2 SD below the mean) are viewed with particular attention as to whether they are normal or abnormal neurocognitive test scores.   Other methods of inference in analyzing the test data are also utilized, including the pattern and range of scores in the profile, bilateral motor functions, and the presence, if any, of pathognomonic signs. Behaviorally, the patient was friendly and cooperative and appeared motivated to perform well during this examination. Within this context, the results of this evaluation are viewed as a valid reflection of the patients actual neurocognitive and emotional status. Her structured word list fluency, as assessed by the FAS Test, was within the mildly impaired range with a T score of 38. Category fluency was within the moderately impaired range with a T score of 29. Confrontation naming ability, as assessed by the [facility] - Revised, was within the mildly to moderately impaired range at 49/60 correct (T = 32). This pattern of performance is indicative of a patient who is at increased risk for day-to-day problems with verbal fluency and confrontation naming ability. The patient was administered the [facility] Continuous Performance Test - III, a computer-administered test of sustained attention, and review of the subscales within this instrument revealed moderate to severe concern for inattentiveness without impulsivity. Verbal auditory attention and discrimination, as assessed by the Speech-Sounds Perception Test (T = 39) was within the mildly impaired  range. This pattern of performance is  indicative of a patient who is at increased risk for day-to-day problems with sustained visual attention/concentration and auditory attention. The patient was administered the Wechsler Adult Intelligence Scale - IV. See Appendix I for full breakdown of IQ test scores (scanned into media section of this EMR). As can be seen, there was no clinically significant difference between her low average range Working Memory Index score of 86 (18th %ile) and her low average range Processing Speed Index score of 89 (23rd %ile). Her Verbal Comprehension Index score of 81 (10th %ile) was low average. Her Perceptual Reasoning Index score of 84 (!4th %ile) was low average. This pattern of performance is not indicative of a patient who is at increased risk for day-to-day problems with working memory and/or processing speed. Scores are grossly commensurate with what would be expected based on her performance on a task estimating premorbid functioning levels. The patient was administered the [place] Verbal Learning Test  - 3 and generated a normal range and positive learning curve over five repeated auditory word list learning trials. An interference trial was within normal limits. Free and cued, short and long delayed recall were within the borderline range. Recognition recall was borderline (5th %ile). Forced choice recall was normal, suggesting good effort. This pattern of performance is not indicative of a patient who is at increased risk for day-to-day problems with auditory learning and/or memory. The patients performance on the copy portion of the Jarvis Complex Figure Test was within normal limits  (>16th %ile). Recall for the complex design was within the low average range after both short (12th %ile) and below average for a long delay (10th %ile) Recognition recall was normal. 24th %ile). This pattern of performance is indicative of a patient who is at increased risk for day-to-day problems with visual memory. That problem is quite mild. Simple timed visual motor sequencing (Trailmaking Test Part A) was within the average range with a T score of 51. Her performance on a similar, but more complex task of timed visual motor sequencing (Trailmaking Test Part B) was within the below average range with a T score of 44.   She made zero sequencing errors on this latter test.   Taken together, this pattern of performance is not indicative of a patient who is at increased risk for day-to-day problems with executive functioning.  strength was moderately impaired range for her dominant hand (T = 29) and mildly impaired for her nondominant hand (T = 35). Fine motor dexterity was mildly to moderately impaired for her dominant hand (T = 34) and mildly impaired for her nondominant hand (T = 39). This potentially reflects some lateralization and neurologic correlation is indicated. The patient rated her current level of pain as \"1/5 - Mild\"  on the Jhoan-Melzack Pain Questionnaire. She reported pain in her right shoulder, feet, neck, and chest.  She reported headache, dizziness, and drowsiness. Her Law Depression Inventory -II score of 31 was within the severely depressed range. Her Law Anxiety Inventory score of 16 reflected moderate anxiety. The patient was also administered the Personality Assessment Inventory and generated a valid profile for interpretation. Within this context, there are numerous clinical scale elevations. Marked depression and anxiety are present. There is depression in this anxiety tend to both manifest with somatic features. Self-concept is harsh and negative. She is distant and withdrawn in those relationships that are maintained. Her self-reported level of treatment motivation is very low. Impressions & Recommendations: This patient generated a mixed normal/abnormal range Neuropsychological Evaluation with respect to neurocognitive functioning. In this regard, impairments are noted for verbal fluency, confrontation naming, sustained visual attention, auditory attention, auditory memory, dominant handed motor skills, and nondominant handed motor skills. Dominant hand is worse than nondominant hand neurologic correlation is indicated here. IQ testing shows scores which are consistently low average. From an emotional standpoint, she reports severe levels of depression and moderate anxiety. Both the depression and the anxiety manifest somatically.   Visual memory is normal and she certainly benefits from cues. The patient has undergone 2 evaluations of neuropsychological/neurocognitive status in the past by another provider. Thus, I am not able to do a test by test comparison as I do not have those data before me. At the same time, I did review summaries of those neuropsychological test results and based on those results compared with the incident testing there is no evidence of a dementia type process at this time. Instead, she again is showing evidence of mild cognitive impairment predominantly related to tasks requiring sustained attention and speed which are significantly exacerbated by worsening depression and anxiety. In addition to continued treatment for attention problems, I do recommend review of her psychiatric medication management for severe levels of depression and moderate anxiety. Active engagement with intensive psychotherapy is also very strongly advised. Consult with OT as well as speech. I believe that, with a reduction in mood should come with it and improvement in day-to-day neurocognitive functioning. It is reassuring to note that her memory scores have not declined over time. The patient should be encouraged to remain as mentally, physically, and socially active as possible. I am not concerned about competency/capacity. Nor do I see the patient as being in need of day-to-day supervision due to marked memory deficits. We now have extensive baseline neurocognitive data on her here. Follow-up as needed. Clinical correlation is, course, indicated. I will discuss these findings with the patient when she follows up with me in the near future. A follow up Neuropsychological Evaluation is indicated on a prn basis, especially if there are any cognitive and/or emotional changes.       DIAGNOSES:   MCI Without Memory Loss, Stable      Major Depression - Severe (worse over time)      Anxiety Disorder - Moderate, With somatic Features (worse over time)           The above information is based upon information currently available to me. If there is any additional information of which I am currently unaware, I would be more than happy to review it upon having it made available to me. Thank you for the opportunity to see this interesting individual.     Sincerely,       Boaz Cintron. Heydi Snell, PsBetty, EdS    CC: Carlita Georges, NP     Time Documentation:      99053 x1 &  69188 x 1 Neuropsych testing/data gathering by Neuropsychologist (60 minutes)     [phone] x 1  96139 x 7 Test Administration/Data Gathering By Technician: (4 hours). 81402 x 1 (first 30 minutes), 30741 x 7 (each additional 30 minutes)    96132 x 1  96133 x 1 Testing Evaluation Services by Neuropsychologist (1 hour, 50 minutes) 96132 x 1 (first hour), 96133 x 1 (50 minutes)    Definitions:      51829/73507:  Neurobehavioral Status Exam, Clinical interview. Clinical assessment of thinking, reasoning and judgment, by neuropsychologist, both face to face time with patient and time interpreting those test results and reporting, first and subsequent hours)    47638/56254: Neuropsychological Test Administration by Technician/Psychometrist, first 30 minutes and each additional 30 minutes. The above includes: Record review. Review of history provided by patient. Review of collaborative information. Testing by Clinician. Review of raw data. Scoring. Report writing of individual tests administered by Clinician. Integration of individual tests administered by psychometrist with NSE/testing by clinician, review of records/history/collaborative information, case Conceptualization, treatment planning, clinical decision making, report writing, coordination Of Care.  Psychometry test codes as time spent by psychometrist administering and scoring neurocognitive/psychological tests under supervision of neuropsychologist.  Integral services including scoring of raw data, data interpretation, case conceptualization, report writing etcetera were initiated after the patient finished testing/raw data collected and was completed on the date the report was signed.

## 2021-08-17 ENCOUNTER — OFFICE VISIT (OUTPATIENT)
Dept: NEUROLOGY | Age: 61
End: 2021-08-17
Payer: COMMERCIAL

## 2021-08-17 DIAGNOSIS — R41.840 ATTENTION DEFICIT: ICD-10-CM

## 2021-08-17 DIAGNOSIS — F32.2 SEVERE MAJOR DEPRESSION (HCC): ICD-10-CM

## 2021-08-17 DIAGNOSIS — Z85.3 HISTORY OF BREAST CANCER: ICD-10-CM

## 2021-08-17 DIAGNOSIS — F41.8 ANXIETY WITH SOMATIC FEATURES: ICD-10-CM

## 2021-08-17 DIAGNOSIS — G31.84 MILD COGNITIVE IMPAIRMENT: Primary | ICD-10-CM

## 2021-08-17 PROCEDURE — 90832 PSYTX W PT 30 MINUTES: CPT | Performed by: CLINICAL NEUROPSYCHOLOGIST

## 2021-08-17 NOTE — LETTER
8/17/2021 8:46 AM    Ms. Lalitha Talbot  [address] [address] [place] [address]    To Whom It May Concern: The above named patient has been seen in our clinic and has undergone extensive neurocognitive and psychological testing. Results show some areas of impairment related to her neurocognitive functioning due to her chemotherapy treatment in the past.  While those issues do not preclude her from working, they do interfere with her ability to work in some specific settings, especially those involving distraction/other people. As such, I recommend that she be allowed to work primarily from home in her current capacity. I believe this accommodation will mitigate her neurocognitive deficits sufficiently and that she does not require any additional accommodations at this time. If there are questions or concerns please have the patient contact our office. Sincerely,        Micah Sapp.  ADILENE Hernández  Neuropsychology

## 2021-08-17 NOTE — PROGRESS NOTES
Prior to seeing the patient I reviewed the records, including the previously completed report, the records in [place], and any updated visits from other providers since I saw the patient last.      Today, I engaged in a psychoeducational and supportive and cognitive/behavioral psychotherapy session with the patient. I provided psychotherapy in the form of psychoeducation and support with respect to the results of the recent Neuropsychological Evaluation, including discussing individual tests as well as patient's areas of neurocognitive strength versus weakness. We discussed, in detail, the following: This patient generated a mixed normal/abnormal range Neuropsychological Evaluation with respect to neurocognitive functioning. In this regard, impairments are noted for verbal fluency, confrontation naming, sustained visual attention, auditory attention, auditory memory, dominant handed motor skills, and nondominant handed motor skills. Dominant hand is worse than nondominant hand neurologic correlation is indicated here. IQ testing shows scores which are consistently low average. From an emotional standpoint, she reports severe levels of depression and moderate anxiety. Both the depression and the anxiety manifest somatically. Visual memory is normal and she certainly benefits from cues.                   The patient has undergone 2 evaluations of neuropsychological/neurocognitive status in the past by another provider. Thus, I am not able to do a test by test comparison as I do not have those data before me. At the same time, I did review summaries of those neuropsychological test results and based on those results compared with the incident testing there is no evidence of a dementia type process at this time.   Instead, she again is showing evidence of mild cognitive impairment predominantly related to tasks requiring sustained attention and speed which are significantly exacerbated by worsening depression and anxiety. In addition to continued treatment for attention problems, I do recommend review of her psychiatric medication management for severe levels of depression and moderate anxiety. Active engagement with intensive psychotherapy is also very strongly advised. Consult with OT as well as speech. I believe that, with a reduction in mood should come with it and improvement in day-to-day neurocognitive functioning. It is reassuring to note that her memory scores have not declined over time. The patient should be encouraged to remain as mentally, physically, and socially active as possible. I am not concerned about competency/capacity. Nor do I see the patient as being in need of day-to-day supervision due to marked memory deficits. We now have extensive baseline neurocognitive data on her here. Follow-up as needed. Clinical correlation is, course, indicated.                 I will discuss these findings with the patient when she follows up with me in the near future. A follow up Neuropsychological Evaluation is indicated on a prn basis, especially if there are any cognitive and/or emotional changes.       DIAGNOSES:                         MCI Without Memory Loss, Stable                                                  Major Depression - Severe (worse over time)                                                  Anxiety Disorder - Moderate, With somatic Features (worse over time)                                                          Education was provided regarding my diagnostic impressions, and we discussed treatment plan/options. I also answered numerous questions related to the clinical findings, including discussing various methods to improve cognition and mood. Counseling provided regarding mood and cognition. CBT and supportive psychotherapy techniques were utilized.   Supportive/Cognitive Behavioral/Solution Focused psychotherapy provided  Discussed rational versus irrational thinking patterns and their consequences. Discussed healthy/adaptive and unhealthy/maladaptive coping. The patient needs to follow up with pcp and psychiatry and psychology for tx of anxiety, attention, depression issues which are worsening. Work issues also discussed. I think she should work from home.     The patient had the following concerns which I deferred to their referring provider: med for cognition meds for mood      Time spent today:  25

## 2021-08-23 ENCOUNTER — OFFICE VISIT (OUTPATIENT)
Dept: NEUROSURGERY | Age: 61
End: 2021-08-23
Payer: COMMERCIAL

## 2021-08-23 VITALS
HEIGHT: 63 IN | WEIGHT: 222.8 LBS | BODY MASS INDEX: 39.48 KG/M2 | SYSTOLIC BLOOD PRESSURE: 110 MMHG | DIASTOLIC BLOOD PRESSURE: 70 MMHG | TEMPERATURE: 97.3 F | OXYGEN SATURATION: 98 % | HEART RATE: 88 BPM

## 2021-08-23 DIAGNOSIS — H93.A3 PULSATILE TINNITUS, BILATERAL: Primary | ICD-10-CM

## 2021-08-23 PROCEDURE — 99214 OFFICE O/P EST MOD 30 MIN: CPT | Performed by: STUDENT IN AN ORGANIZED HEALTH CARE EDUCATION/TRAINING PROGRAM

## 2021-08-23 RX ORDER — INSULIN DEGLUDEC 200 U/ML
INJECTION, SOLUTION SUBCUTANEOUS
COMMUNITY
End: 2021-09-24

## 2021-08-23 RX ORDER — SEMAGLUTIDE 1.34 MG/ML
INJECTION, SOLUTION SUBCUTANEOUS
COMMUNITY
End: 2021-09-24

## 2021-08-23 NOTE — PATIENT INSTRUCTIONS
Have imaging done and provider will call you with the results. A Healthy Lifestyle: Care Instructions  Your Care Instructions     A healthy lifestyle can help you feel good, stay at a healthy weight, and have plenty of energy for both work and play. A healthy lifestyle is something you can share with your whole family. A healthy lifestyle also can lower your risk for serious health problems, such as high blood pressure, heart disease, and diabetes. You can follow a few steps listed below to improve your health and the health of your family. Follow-up care is a key part of your treatment and safety. Be sure to make and go to all appointments, and call your doctor if you are having problems. It's also a good idea to know your test results and keep a list of the medicines you take. How can you care for yourself at home? · Do not eat too much sugar, fat, or fast foods. You can still have dessert and treats now and then. The goal is moderation. · Start small to improve your eating habits. Pay attention to portion sizes, drink less juice and soda pop, and eat more fruits and vegetables. ? Eat a healthy amount of food. A 3-ounce serving of meat, for example, is about the size of a deck of cards. Fill the rest of your plate with vegetables and whole grains. ? Limit the amount of soda and sports drinks you have every day. Drink more water when you are thirsty. ? Eat plenty of fruits and vegetables every day. Have an apple or some carrot sticks as an afternoon snack instead of a candy bar. Try to have fruits and/or vegetables at every meal.  · Make exercise part of your daily routine. You may want to start with simple activities, such as walking, bicycling, or slow swimming. Try to be active 30 to 60 minutes every day. You do not need to do all 30 to 60 minutes all at once. For example, you can exercise 3 times a day for 10 or 20 minutes.  Moderate exercise is safe for most people, but it is always a good idea to talk to your doctor before starting an exercise program.  · Keep moving. [employer] the lawn, work in the garden, or [employer]. Take the stairs instead of the elevator at work. · If you smoke, quit. People who smoke have an increased risk for heart attack, stroke, cancer, and other lung illnesses. Quitting is hard, but there are ways to boost your chance of quitting tobacco for good. ? Use nicotine gum, patches, or lozenges. ? Ask your doctor about stop-smoking programs and medicines. ? Keep trying. In addition to reducing your risk of diseases in the future, you will notice some benefits soon after you stop using tobacco. If you have shortness of breath or asthma symptoms, they will likely get better within a few weeks after you quit. · Limit how much alcohol you drink. Moderate amounts of alcohol (up to 2 drinks a day for men, 1 drink a day for women) are okay. But drinking too much can lead to liver problems, high blood pressure, and other health problems. Family health  If you have a family, there are many things you can do together to improve your health. · Eat meals together as a family as often as possible. · Eat healthy foods. This includes fruits, vegetables, lean meats and dairy, and whole grains. · Include your family in your fitness plan. Most people think of activities such as jogging or tennis as the way to fitness, but there are many ways you and your family can be more active. Anything that makes you breathe hard and gets your heart pumping is exercise. Here are some tips:  ? Walk to do errands or to take your child to school or the bus.  ? Go for a family bike ride after dinner instead of watching TV. Where can you learn more? Go to http://www.gray.com/  Enter M173 in the search box to learn more about \"A Healthy Lifestyle: Care Instructions. \"  Current as of: September 23, 2020               Content Version: 12.8  © 7347-9619 Healthwise, [facility].    Care instructions adapted under license by [employer] (which disclaims liability or warranty for this information). If you have questions about a medical condition or this instruction, always ask your healthcare professional. [address] any warranty or liability for your use of this information.

## 2021-08-23 NOTE — PROGRESS NOTES
New patient Self referred presenting with Pulsatile Tinnitus Bilaterally. Patient reports she hears the sound of crickets constantly in both ears. Stated she fells as if the sound is more in her head. Reports symptoms started about 3 months after her open heart surgery in early 2020. Reports frequent headaches. Has seen ENT and was given hearing aids.

## 2021-08-24 NOTE — PROGRESS NOTES
NeuroInterventional Surgery Note  Evelin Shah MD    Patient: Cheryle Purpura MRN: [id number]  SSN: [id number]    YOB: 1960  Age: 62 y.o. Sex: female      Chief Complaint: pulsatile tinnitus     Subjective:      Cheryle Purpura is a 62 y.o. female who is being seen for pulsatile tinnitus     Past Medical History:   Diagnosis Date    Anxiety     CAD (coronary artery disease)     CABG 1/3/2020    Cancer [facility])     R breast cancer dx 3/2014; chemo    Depression     Diabetes ([facility] [address]     Dyspepsia and other specified disorders of function of stomach     Hypercholesterolemia     Neuropathy     MARISA (obstructive sleep apnea)     wears cpap    Other and unspecified hyperlipidemia     Psychiatric disorder     Snoring     Stroke ([facility] [address] 1980's    ?slight stroke due to oral contraceptives    Thyroid disease     Type II or unspecified type diabetes mellitus without mention of complication, uncontrolled     Unspecified hypothyroidism      Family History   Problem Relation Age of Onset    Diabetes Mother         type 2    Thyroid Disease Mother     Heart Disease Mother     Cancer Mother 61        breast & lung    Hypertension Mother     Diabetes Father         type 1     Diabetes Sister         borderline    Dementia Other     Stroke Neg Hx      Social History     Tobacco Use    Smoking status: Former Smoker     Packs/day: 1.00     Years: 30.00     Pack years: 30.00    Smokeless tobacco: Never Used   Substance Use Topics    Alcohol use: No      Cannot display prior to admission medications because the patient has not been admitted in this contact. Allergies   Allergen Reactions    Ceftin [Cefuroxime Axetil] Hives       Review of Systems:  A comprehensive review of systems was negative except for that written in the History of Present Illness. Denies numbness, tingling, chest pain, leg pain, nausea, vomiting, difficulty swallowing, headache, and dyspnea. Objective:     Vitals:    08/23/21 1142   BP: 110/70   Pulse: 88   Temp: 97.3 °F (36.3 °C)   TempSrc: Temporal   SpO2: 98%   Weight: 222 lb 12.8 oz (101.1 kg)   Height: 5' 3\" (1.6 m)      Physical Exam:  GENERAL: Calm, cooperative, NAD  SKIN: Warm, dry, color appropriate for ethnicity. Lungs: clear to auscultation bilaterally  Heart: regular rate and rhythm, S1, S2 normal, no murmur, click, rub or gallop    Neurologic Exam:  Mental Status:  Alert and oriented x 4. Appropriate affect, mood and behavior. Language:    Normal fluency, repetition, comprehension and naming. Cranial Nerves:   Pupils 3 mm, equal, round and reactive to light. Visual fields full to confrontation. Extraocular movements intact. Facial sensation intact. Full facial strength, no asymmetry. Hearing grossly intact bilaterally. No dysarthria. Tongue protrudes to midline, palate elevates symmetrically. Shoulder shrug 5/5 bilaterally. Motor:    No pronator drift. Bulk and tone normal.      5/5 power in all extremities proximally and distally. No involuntary movements. Sensation:    Sensation intact throughout to light touch, temperature, pinprick, vibration, proprioception     Reflexes:    Reflexes are 2+ at the biceps, triceps, patella and achilles bilaterally. Coordination & Gait: Normal. FTN and HTS intact with no ataxia present.     Labs:  Lab Results   Component Value Date/Time    WBC 6.1 05/19/2017 01:22 PM    Hemoglobin (POC) 12.9 12/19/2016 02:46 PM    HGB 13.5 05/19/2017 01:22 PM    Hematocrit (POC) 38 12/19/2016 02:46 PM    HCT 38.7 05/19/2017 01:22 PM    PLATELET 546 40/98/3185 01:22 PM    MCV 86.6 05/19/2017 01:22 PM      Lab Results   Component Value Date/Time    Sodium 141 06/19/2017 02:51 PM    Potassium 3.8 06/19/2017 02:51 PM    Chloride 105 06/19/2017 02:51 PM    CO2 26 06/19/2017 02:51 PM    Anion gap 10 06/19/2017 02:51 PM    Glucose 210 (H) 06/19/2017 02:51 PM BUN 10 06/19/2017 02:51 PM    Creatinine 0.85 06/19/2017 02:51 PM    BUN/Creatinine ratio 12 06/19/2017 02:51 PM    GFR est AA >60 06/19/2017 02:51 PM    GFR est non-AA >60 06/19/2017 02:51 PM    Calcium 8.5 06/19/2017 02:51 PM     No results found for: CPK, RCK1, RCK2, RCK3, RCK4, CKMB, CKNDX, CKND1, TROPT, TROIQ, BNPP, BNP    Imaging:  CT Results (maximum last 3): Results from [place] encounter on 06/09/20    CT CHEST W CONT    Narrative  INDICATION: right chest wall fullness    COMPARISON: 10/14/2015    TECHNIQUE:  Following the uneventful intravenous administration of 100 cc  Isovue-300, 5 mm axial images were obtained through the chest. Coronal and  sagittal reformats were generated. CT dose reduction was achieved through use  of a standardized protocol tailored for this examination and automatic exposure  control for dose modulation. FINDINGS:    CHEST WALL: No mass or axillary lymphadenopathy. Prior right lumpectomy and  right axillary dissection. THYROID: No nodule. MEDIASTINUM: No mass or lymphadenopathy. MARY: No mass or lymphadenopathy. THORACIC AORTA: No dissection or aneurysm. MAIN PULMONARY ARTERY: Normal in caliber. TRACHEA/BRONCHI: Patent. ESOPHAGUS: No wall thickening or dilatation. HEART: Normal in size. Heavy coronary artery calcification. PLEURA: No effusion or pneumothorax. LUNGS: No nodule, mass, or airspace disease. Small amount of pleural parenchymal  nodularity along the anterior aspect of the right middle lobe. INCIDENTALLY IMAGED UPPER ABDOMEN: Diffuse hypodensity of the liver. Pancreatic  head fat-containing 16 mm lipoma (coronal image 67). BONES: No destructive bone lesion. Nonunion of the sternum. Impression  IMPRESSION:  No evidence for metastatic disease to the chest.  Post operative and post treatment changes as described.   Hepatic steatosis      Results from Hospital Encounter encounter on 05/16/17    CTA ABD PELV W WO CONT    Narrative  INDICATION: lactic acidosis, evaluate for mesenteric ischemia    COMPARISON: CT abdomen 2/12/2016, CT chest 10/14/2015    TECHNIQUE:  Thin axial images were obtained through the abdomen and pelvis with and without  intravenous iodinated contrast administration. Coronal and sagittal  reconstructions were generated. Oral contrast was not administered. CT dose  reduction was achieved through use of a standardized protocol tailored for this  examination and automatic exposure control for dose modulation. FINDINGS:    Arteriogram:    Abdominal aorta is patent and normal in caliber. Mild infrarenal abdominal  aortic atherosclerotic change without significant stenosis. No dissection. The  celiac artery, SMA, and PARIS are widely patent. 2 right and single left renal  arteries are patent. Bilateral iliac arteries are patent without any significant  stenosis. Arterial phase of imaging limits evaluation of the solid abdominal organs. LIVER: No mass or biliary dilatation. GALLBLADDER: No calcified gallstone  SPLEEN: Unremarkable  PANCREAS: No mass or ductal dilatation. ADRENALS: Unremarkable. KIDNEYS/URETERS: Normal symmetric nephrograms with small left lower pole renal  cyst. Small bilateral nonobstructing calculi. No hydronephrosis. PERITONEUM: No abdominal lymphadenopathy or ascites. COLON: No dilatation or wall thickening. APPENDIX: Unremarkable. SMALL BOWEL: No dilatation or wall thickening. STOMACH: Unremarkable. PELVIS: No pelvic lymphadenopathy. Trace pelvic free fluid. BONES: No destructive bone lesion. VISUALIZED THORAX: Minimal right middle lobe and lingular atelectasis and/or  scarring  ADDITIONAL COMMENTS: N/A    Impression  IMPRESSION:    Widely patent mesenteric arteries. No evidence for acute abdominal abnormality. Trace pelvic free fluid. Please refer to above findings for complete details.       Results from [place] encounter on 02/12/16    CT ABD W CONT    Narrative  **Final Report**      ICD Codes / Adm. Diagnosis: 174.9  C50.919 / Malignant neoplasm of breast (  Malignant neoplasm of unspec  Examination:  CT ABDOMEN W CON  - [id number] - Feb 12 2016  8:36AM  Accession No:  [id number]  Reason:  eval knot below sternum; history of breast cancer      REPORT:  EXAM:  CT ABDOMEN W CON    INDICATION:   eval knot below sternum; history of breast cancer    COMPARISON: CT 10/5/2015. CONTRAST:  95 mL of Isovue-370. TECHNIQUE:  Multislice helical CT was performed from the diaphragm to the iliac crest  following uneventful rapid bolus intravenous contrast administration. Oral  contrast was also administered. Contiguous 5 mm axial images were  reconstructed and lung and soft tissue windows were generated. Coronal and  sagittal reformations were generated. FINDINGS:  Small areas of subpleural fibrosis are seen in the right middle lobe with to  prior radiation therapy. The lung bases are otherwise clear. The patient is status post bilateral mastectomy. Postsurgical changes are  seen in the anterior inferior abdominal wall, greater on the right. A  metallic marker was placed over the sternum. There is unremarkable  subcutaneous fat between the marker and the sternum. The xiphoid process has  an incidental hooked appearance extending anteriorly as seen on sagittal  series 602B image 90. This may be what the patient feels. The liver is mildly hypodense likely related to mild hepatic steatosis. No  evidence of a focal liver lesion. The spleen, bilateral adrenal glands, and  pancreas are unremarkable. The gallbladder is unremarkable. No evidence of  biliary dilatation. There is an unchanged 1.6 m cyst in the inferior pole  the left kidney. Several small nonobstructive stones are seen in both  kidneys. No evidence of hydronephrosis. The abdominal aorta contains atherosclerotic calcifications, but no  aneurysm. No retroperitoneal or mesenteric lymphadenopathy.     The visualized bowel appears unremarkable. No evidence of a destructive osseous lesion. Impression  :  1. The xiphoid process has a hooked appearance extending anteriorly. This  may be what the patient feels. No evidence of a soft tissue mass. 2. Status post bilateral mastectomy with postsurgical changes seen in the  anterior inferior chest wall, right greater than left. 3. Mild hepatic steatosis. 4. Unchanged left renal cyst.  5. Bilateral nonobstructive nephrolithiasis. Signing/Reading Doctor: Meryle Harlem ([id number])  Approved: Meryle Harlem ([id number])  Feb 12 2016  1:08PM    Assessment and Plan:   A 71-year-old right-handed female with past medical history of breast cancer, depression, DM, HLP, MARISA on CPAP, thyroid disease, bypass surgery for CAD, presenting for evaluation of pulsatile tinnitus (whole head). She refers hearing as cricket sounds 24/7, louder sometimes especially at bedtime. She does not know if it started on the right or left ear. She hears it on her whole head. Patient refers seeing an ENT and told her that she has slight hearing loss and required hearing aids. The tinnitus is more pronounced in a quiet environment. Nothing makes it worse or better. No blurry vision or double vision. Mild headaches that assumes to be related to stress and goes away with Tylenol. She takes 3 Benadryl at night to sleep for many years. She refers that her anxiety and depression is worse with the tinnitus. Sometimes she is forgetful. No smoking, alcohol or drugs. No family history of aneurysms or brain bleed. MRI brain w/o contrast 3/10/21: Normal. THI: 71. Plan:  CT head of the temporal bones to rule out semicircular canal dehiscence. CTA head and neck followed by diagnostic cerebral angiogram to rule out arteriovenous fistula      Thank you for this consult and participating in the care of this patient.   Signed By: Gala Escobar MD     August 24, 2021

## 2021-08-26 ENCOUNTER — HOSPITAL ENCOUNTER (OUTPATIENT)
Dept: CT IMAGING | Age: 61
Discharge: HOME | End: 2021-08-26
Attending: STUDENT IN AN ORGANIZED HEALTH CARE EDUCATION/TRAINING PROGRAM
Payer: COMMERCIAL

## 2021-08-26 DIAGNOSIS — H93.A3 PULSATILE TINNITUS, BILATERAL: ICD-10-CM

## 2021-08-26 PROCEDURE — 74011000636 HC RX REV CODE- 636: Performed by: STUDENT IN AN ORGANIZED HEALTH CARE EDUCATION/TRAINING PROGRAM

## 2021-08-26 PROCEDURE — 70480 CT ORBIT/EAR/FOSSA W/O DYE: CPT

## 2021-08-26 PROCEDURE — 70498 CT ANGIOGRAPHY NECK: CPT

## 2021-08-26 RX ADMIN — IOPAMIDOL 100 ML: 755 INJECTION, SOLUTION INTRAVENOUS at 15:52

## 2021-09-02 ENCOUNTER — TELEPHONE (OUTPATIENT)
Dept: NEUROSURGERY | Age: 61
End: 2021-09-02

## 2021-09-07 ENCOUNTER — DOCUMENTATION ONLY (OUTPATIENT)
Dept: NEUROSURGERY | Age: 61
End: 2021-09-07

## 2021-09-07 NOTE — PROGRESS NOTES
Progress Note  I discussed with patient that her CT temporal bones and CTA head and neck were negative. She was explained that in a small proportion of patients CTA cant not show fistulas that are small or supplied by small arteries. She was explained that the risk of cerebral angiography is less than 0.5% of causing stroke, cerebral hemorrhage, dissection or groin hematoma. Patient said she can not live with the tinnitus anymore and would like to have the test done.  Will schedule her for cerebral angiogram.    Kat Randall MD  [facility]

## 2021-09-08 ENCOUNTER — TELEPHONE (OUTPATIENT)
Dept: NEUROSURGERY | Age: 61
End: 2021-09-08

## 2021-09-08 NOTE — TELEPHONE ENCOUNTER
Spoke to patient to confirm 9/15/2021 for Diagnostic angiogram/angiography at [facility]. Arrival time 7:00 am.  See providers note dated 9/7/2021. Patent will return the call as she has to check with her son for his availability.

## 2021-09-08 NOTE — TELEPHONE ENCOUNTER
Return call from patient. Patient declined date of 9/15/2021 for her Diagnostic angiogram.  Requested 9/29/2021. Date confirmed with patient.

## 2021-09-20 ENCOUNTER — DOCUMENTATION ONLY (OUTPATIENT)
Dept: NEUROSURGERY | Age: 61
End: 2021-09-20

## 2021-09-20 NOTE — PROGRESS NOTES
Spoke with Al from [facility], he stated that patient has coverage effective 9/1/2021 with no term date.  Reference number [id number]

## 2021-09-22 DIAGNOSIS — H93.A3 PULSATILE TINNITUS, BILATERAL: Primary | ICD-10-CM

## 2021-09-23 ENCOUNTER — TELEPHONE (OUTPATIENT)
Dept: NEUROSURGERY | Age: 61
End: 2021-09-23

## 2021-09-23 DIAGNOSIS — H93.A3 PULSATILE TINNITUS, BILATERAL: Primary | ICD-10-CM

## 2021-09-23 NOTE — TELEPHONE ENCOUNTER
Spoke to patient to reschedule Diagnostic angiogram for 10/14/2021 at [facility]. Arrival time 7:00am.  Informed patient she will need to have labs drawn prior to angiogram.  Patient stated understanding.

## 2021-09-24 ENCOUNTER — OFFICE VISIT (OUTPATIENT)
Dept: FAMILY MEDICINE CLINIC | Age: 61
End: 2021-09-24
Payer: COMMERCIAL

## 2021-09-24 VITALS
BODY MASS INDEX: 39.23 KG/M2 | WEIGHT: 221.4 LBS | HEIGHT: 63 IN | OXYGEN SATURATION: 97 % | SYSTOLIC BLOOD PRESSURE: 104 MMHG | TEMPERATURE: 97.9 F | RESPIRATION RATE: 16 BRPM | DIASTOLIC BLOOD PRESSURE: 57 MMHG | HEART RATE: 85 BPM

## 2021-09-24 DIAGNOSIS — E78.2 MIXED HYPERLIPIDEMIA: ICD-10-CM

## 2021-09-24 DIAGNOSIS — R60.0 BILATERAL LEG EDEMA: ICD-10-CM

## 2021-09-24 DIAGNOSIS — R16.0 ENLARGED LIVER: ICD-10-CM

## 2021-09-24 DIAGNOSIS — Z11.59 ENCOUNTER FOR HEPATITIS C SCREENING TEST FOR LOW RISK PATIENT: ICD-10-CM

## 2021-09-24 DIAGNOSIS — R06.02 SHORTNESS OF BREATH: ICD-10-CM

## 2021-09-24 DIAGNOSIS — E78.2 MIXED HYPERLIPIDEMIA: Primary | ICD-10-CM

## 2021-09-24 DIAGNOSIS — E53.8 B12 DEFICIENCY: ICD-10-CM

## 2021-09-24 DIAGNOSIS — F06.31 DEPRESSION DUE TO PHYSICAL ILLNESS: ICD-10-CM

## 2021-09-24 DIAGNOSIS — Z13.31 POSITIVE DEPRESSION SCREENING: ICD-10-CM

## 2021-09-24 PROCEDURE — 99204 OFFICE O/P NEW MOD 45 MIN: CPT | Performed by: STUDENT IN AN ORGANIZED HEALTH CARE EDUCATION/TRAINING PROGRAM

## 2021-09-24 PROCEDURE — 96372 THER/PROPH/DIAG INJ SC/IM: CPT | Performed by: STUDENT IN AN ORGANIZED HEALTH CARE EDUCATION/TRAINING PROGRAM

## 2021-09-24 RX ORDER — METOPROLOL SUCCINATE 100 MG/1
TABLET, EXTENDED RELEASE ORAL
COMMUNITY
Start: 2021-04-22

## 2021-09-24 RX ORDER — INSULIN LISPRO 200 [IU]/ML
INJECTION, SOLUTION SUBCUTANEOUS
COMMUNITY
End: 2022-04-12

## 2021-09-24 RX ORDER — FUROSEMIDE 20 MG/1
TABLET ORAL
COMMUNITY
Start: 2021-04-22

## 2021-09-24 RX ORDER — FLUOXETINE HYDROCHLORIDE 20 MG/1
20 CAPSULE ORAL
Qty: 90 CAPSULE | Refills: 1 | Status: SHIPPED | OUTPATIENT
Start: 2021-09-24 | End: 2021-09-28 | Stop reason: DRUGHIGH

## 2021-09-24 RX ORDER — SEMAGLUTIDE 1.34 MG/ML
INJECTION, SOLUTION SUBCUTANEOUS
COMMUNITY

## 2021-09-24 RX ORDER — CYANOCOBALAMIN 1000 UG/ML
INJECTION, SOLUTION INTRAMUSCULAR; SUBCUTANEOUS
COMMUNITY
Start: 2021-09-12 | End: 2021-10-27 | Stop reason: DRUGHIGH

## 2021-09-24 RX ORDER — FLUOXETINE 10 MG/1
10 CAPSULE ORAL DAILY
Qty: 90 CAPSULE | Refills: 1 | Status: SHIPPED | OUTPATIENT
Start: 2021-09-24 | End: 2021-09-28 | Stop reason: DRUGHIGH

## 2021-09-24 RX ORDER — CYANOCOBALAMIN 1000 UG/ML
1000 INJECTION, SOLUTION INTRAMUSCULAR; SUBCUTANEOUS
Status: SHIPPED | OUTPATIENT
Start: 2021-09-24

## 2021-09-24 RX ORDER — DEXTROAMPHETAMINE SULFATE, DEXTROAMPHETAMINE SACCHARATE, AMPHETAMINE SULFATE AND AMPHETAMINE ASPARTATE 6.25; 6.25; 6.25; 6.25 MG/1; MG/1; MG/1; MG/1
CAPSULE, EXTENDED RELEASE ORAL
COMMUNITY
Start: 2021-09-16 | End: 2021-12-10 | Stop reason: SDUPTHER

## 2021-09-24 RX ORDER — INSULIN GLARGINE 300 U/ML
INJECTION, SOLUTION SUBCUTANEOUS
COMMUNITY
Start: 2021-04-21 | End: 2022-04-12

## 2021-09-24 RX ADMIN — CYANOCOBALAMIN 1000 MCG: 1000 INJECTION, SOLUTION INTRAMUSCULAR; SUBCUTANEOUS at 15:47

## 2021-09-24 NOTE — PROGRESS NOTES
Chief Complaint   Patient presents with   [address]     Would like to discuss depression. She is scheduled to speak to a counselor already. She was diagnosed with \"chemo brain\" and has tinnitus all day \"and going to drive me crazy\"  Had neuropsych testing with Dr. Tariq Fernández. Visit Vitals  BP (!) 104/57 (BP 1 Location: Left arm, BP Patient Position: Sitting, BP Cuff Size: Adult long)   Pulse 85   Temp 97.9 °F (36.6 °C) (Temporal)   Resp 16   Ht 5' 3\" (1.6 m)   Wt 221 lb 6.4 oz (100.4 kg)   SpO2 97%   BMI 39.22 kg/m²     After obtaining consent, and per orders of Dr. Janice Sal, injection of B12 given to patient in R deltoid by Kristi Ny LPN. Patient brought her medication in with her today.

## 2021-09-24 NOTE — PATIENT INSTRUCTIONS
Please call [facility] [phone] and ask to be transferred to central radiology scheduling and arrange for your Chest X ray and Utrasound . The order is already placed in the computer         [address] [phone] 500 [facility]-  [address] [place] [address]  1000 [facility]  [phone]         Depression and Chronic Disease: Care Instructions  Your Care Instructions     A chronic disease is one that you have for a long time. Some chronic diseases can be controlled, but they usually cannot be cured. Depression is common in people with chronic diseases, but it often goes unnoticed. Many people have concerns about seeking treatment for a mental health problem. You may think it's a sign of weakness, or you don't want people to know about it. It's important to overcome these reasons for not seeking treatment. Treating depression or anxiety is good for your health. Follow-up care is a key part of your treatment and safety. Be sure to make and go to all appointments, and call your doctor if you are having problems. It's also a good idea to know your test results and keep a list of the medicines you take. How can you care for yourself at home? Watch for symptoms of depression  The symptoms of depression are often subtle at first. You may think they are caused by your disease rather than depression. Or you may think it is normal to be depressed when you have a chronic disease. If you are depressed you may:  · Feel sad or hopeless. · Feel guilty or worthless. · Not enjoy the things you used to enjoy. · Feel hopeless, as though life is not worth living. · Have trouble thinking or remembering. · Have low energy, and you may not eat or sleep well. · Pull away from others. · Think often about death or killing yourself.  (Keep the numbers for these national suicide hotlines: 0-645-176-TALK [1-930.913.6284] and 8-163-HMHYXSS [1-885.656.2408]. )  Get treatment  By treating your depression, you can feel more hopeful and have more energy. If you feel better, you may take better care of yourself, so your health may improve. · Talk to your doctor if you have any changes in mood during treatment for your disease. · Ask your doctor for help. Counseling, antidepressant medicine, or a combination of the two can help most people with depression. Often a combination works best. Counseling can also help you cope with having a chronic disease. When should you call for help? Call 911 anytime you think you may need emergency care. For example, call if:    · You feel like hurting yourself or someone else.     · Someone you know has depression and is about to attempt or is attempting suicide. Call your doctor now or seek immediate medical care if:    · You hear voices.     · Someone you know has depression and:  ? Starts to give away his or her possessions. ? Uses illegal drugs or drinks alcohol heavily. ? Talks or writes about death, including writing suicide notes or talking about guns, knives, or pills. ? Starts to spend a lot of time alone. ? Acts very aggressively or suddenly appears calm. Watch closely for changes in your health, and be sure to contact your doctor if:    · You do not get better as expected. Where can you learn more? Go to http://www.gray.com/  Enter A548 in the search box to learn more about \"Depression and Chronic Disease: Care Instructions. \"  Current as of: June 16, 2021               Content Version: 13.0  © 9958-5695 [employer]. Care instructions adapted under license by [employer] (which disclaims liability or warranty for this information).  If you have questions about a medical condition or this instruction, always ask your healthcare professional. Hipolito Cormier disclaims any warranty or liability for your use of this information.

## 2021-09-25 LAB
CHOLEST SERPL-MCNC: 172 MG/DL (ref 100–199)
FOLATE SERPL-MCNC: 11.3 NG/ML
FOLATE SERPL-MCNC: 12.4 NG/ML
HCV AB S/CO SERPL IA: <0.1 S/CO RATIO (ref 0–0.9)
HDLC SERPL-MCNC: 33 MG/DL
IMP & REVIEW OF LAB RESULTS: NORMAL
LDLC SERPL CALC-MCNC: 88 MG/DL (ref 0–99)
NT-PROBNP SERPL-MCNC: 102 PG/ML (ref 0–287)
TRIGL SERPL-MCNC: 306 MG/DL (ref 0–149)
VIT B12 SERPL-MCNC: >2000 PG/ML (ref 232–1245)
VIT B12 SERPL-MCNC: >2000 PG/ML (ref 232–1245)
VLDLC SERPL CALC-MCNC: 51 MG/DL (ref 5–40)

## 2021-09-28 DIAGNOSIS — F06.31 DEPRESSION DUE TO PHYSICAL ILLNESS: Primary | ICD-10-CM

## 2021-09-28 DIAGNOSIS — E78.2 MIXED HYPERLIPIDEMIA: Primary | ICD-10-CM

## 2021-09-28 RX ORDER — FLUOXETINE HYDROCHLORIDE 40 MG/1
40 CAPSULE ORAL DAILY
Qty: 90 CAPSULE | Refills: 1 | Status: SHIPPED | OUTPATIENT
Start: 2021-09-28 | End: 2021-10-27 | Stop reason: DRUGHIGH

## 2021-09-28 RX ORDER — ROSUVASTATIN CALCIUM 20 MG/1
20 TABLET, COATED ORAL
Qty: 90 TABLET | Refills: 3 | Status: SHIPPED | OUTPATIENT
Start: 2021-09-28 | End: 2021-12-27

## 2021-09-28 NOTE — PROGRESS NOTES
BNP wnl   Hep C screen neg   TG high an dLDL not quite at goal of <70 for diabetic.   Will increase crestor to 20mg   B12/Folate wnl     Mychart message sent to patient

## 2021-09-28 NOTE — PROGRESS NOTES
Patient messaged me and informed me that she had been alternating 20mg and 40mg daily. We had gone up on her dose to 30mg daily at her last visit as I was under the impression she was only taking 20mg daily. We will increase her daily dose to 40mg every day for the time being given her reported increased depression.       Plan   STOP Prozac 10mg and Prozav 20mg   START Prozac 40mg     Follow up as scheduled

## 2021-10-01 ENCOUNTER — HOSPITAL ENCOUNTER (OUTPATIENT)
Dept: GENERAL RADIOLOGY | Age: 61
Discharge: HOME | End: 2021-10-01
Attending: STUDENT IN AN ORGANIZED HEALTH CARE EDUCATION/TRAINING PROGRAM
Payer: COMMERCIAL

## 2021-10-01 ENCOUNTER — HOSPITAL ENCOUNTER (OUTPATIENT)
Dept: ULTRASOUND IMAGING | Age: 61
Discharge: HOME | End: 2021-10-01
Attending: STUDENT IN AN ORGANIZED HEALTH CARE EDUCATION/TRAINING PROGRAM
Payer: COMMERCIAL

## 2021-10-01 ENCOUNTER — TELEPHONE (OUTPATIENT)
Dept: FAMILY MEDICINE CLINIC | Age: 61
End: 2021-10-01

## 2021-10-01 DIAGNOSIS — R60.0 BILATERAL LEG EDEMA: ICD-10-CM

## 2021-10-01 DIAGNOSIS — R16.0 ENLARGED LIVER: ICD-10-CM

## 2021-10-01 DIAGNOSIS — R06.02 SHORTNESS OF BREATH: ICD-10-CM

## 2021-10-01 DIAGNOSIS — R16.0 HEPATOMEGALY: Primary | ICD-10-CM

## 2021-10-01 PROCEDURE — 71046 X-RAY EXAM CHEST 2 VIEWS: CPT

## 2021-10-01 PROCEDURE — 76705 ECHO EXAM OF ABDOMEN: CPT

## 2021-10-01 NOTE — PROGRESS NOTES
CVICU History and Physical      Patient Name: Kecia Blue MRN# [id number]   Age: 56 year old YOB: 1961     Date of Transfer:2/27/2018  Primary care provider: Rosy Vu  Date of Service: 2/27/2018  Admitting Team: CV ICU         Assessment and Plan:   Kecia Blue is a 56 year old female with history of dermatomyositis on immunosuppression, eronegative RA, ILD, and pulmonary hypertension, who was admitted for emergent lung transplant work-up on 2/21 after being admitted for increasing shortness of breath and hypoxia. S/p emergent V-A ECMO on 2/27 for right hear failure.      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 Infectious work-up has been negative, RVP pending. Cath done prior to admission, clean coronaries and normal left-sided pressures. Severe pulmonary HTN at baseline. R heart failure prior to ECmo  - Ventilator: CMV/AC  - Flolan 20 ng/kg/min  - Inhaled NO at 20ppm wean to 10 overnight and if continues to do well wean to 5 by AM  - No benefit in proning patient, keep supine  - ECMO. Q4H ABG    ===CARDIAC===   # Right heart failure  # Pulmonary HTN  Most likely 2/2 pulmonary hypertension from ILD, but did not rule out PE - unlikely given hypotension occurred after intubation and did not occur at the same time as her worsening hypoxia.     ===RENAL===  # RADHA, resolved  On presentation, patient had elevated BUN:Cr suggestive of prerenal, resolved with fluids.  - Monitor with labs daily    ===INFECTIOUS DISEASE===  # Respiratory failure  Infectious causes have been worked up and found negative so far.  - Respiratory Viral Panel  - Sputum gram stain,  CXR wnl and normal BNP. Do not think fluid is coming from heart.      Called Pt and discussed results culture  - prophylactc vanc and zosyn on ECMO    Antimicrobials:   Bactrim, chronic medication MWF  Cultures/ID workup:  - 2/27 Sputum stain/culture pending  - 2/27 RVP pending  - 2/22 negative influenza  - 2/21 Blood cultures x 2 final result no growth    ===GASTROINTESTINAL/GENITOURINARY===  No acute issues    Nutrition: Feeding tube placed, nutrition consulted for tube feeding management    ===ENDOCRINE===  No acute issues    ===HEMATOLOGIC===  No acute issues    ===SKIN/MSK===  # Dermatomyositis  - Continue home medications: tacrolimus, azathioprine  - D/C home prednisone (5mg daily) --> giving stress dose steroids of hydrocortisone 50mg Q6H    # Immunocompromised 2/2 medication  - Continue home medicaitons: nystatin, bactrim    FEN: Nutrition consulted for tube feeding  PPX: Famotidine BID  Family: Updated at bedside  Code status: Full code  Dispo: Critically ill, CV ICU    Patient discussed with Dr. Hernandez, CVTS Staff    [facility]           Chief Complaint:   Increased work of breathing  Transfer on ECMO         HPI:   Kecia Blue is a 56 year old female with history of dermatomyositis, seronegative RA, ILD, and pulmonary hypertension, who was undergoing lung transplant work-up, and was admitted for increasing shortness of breath and hypoxia. She was admitted on 2/21/2018 to Medicine for increasing shortness of breath. She was on 3-5L O2, and then has slowly been increasing her oxygen use, up to 10-15L. While admitted to medicine, she underwent work-up for her worsening SOB. Patient required HFNC with BiPAP at night to keep SaO2 88-92%. ON 2/26 patient had increased FIO2 requirements and was subsequently intubated. She was found to have pneumomediastinum on chest CT and then had increased pressor requirements with severe Right heart failure on ECHP/ S/p emergent V-A ECMO on 2/27 for right hear failure.      Patient was diagnosed with dermatomyositis in 2014, and then ILD in 2015.           Past Medical History:   Reviewed in EMR, unable to confirm with patient since intubated and sedated.    Past Medical History:   Diagnosis Date     Antisynthetase syndrome (H) 2014     Chronic cough      Dermatomyositis (H)      Dysplasia of cervix, low grade (ESTRADA 1)      ILD (interstitial lung disease) (H)      Osteopenia      Pulmonary hypertension      Raynaud's disease      Seronegative rheumatoid arthritis (H)             Past Surgical History:   Reviewed in EMR, unable to confirm with patient since intubated and sedated.    Past Surgical History:   Procedure Laterality Date     ENT SURGERY      tonsillectomy as a child     no prior surgery              Social History:   Reviewed in EMR, unable to confirm with patient since intubated and sedated.    Social History     Social History     Marital status:      Spouse name: N/A     Number of children: N/A     Years of education: N/A     Occupational History     Not on file.     Social History Main Topics     Smoking status: Never Smoker     Smokeless tobacco: Never Used     Alcohol use 0.6 oz/week     1 Glasses of wine per week      Comment: 1 glass 3 times weekly     Drug use: No     Sexual activity: Not on file     Other Topics Concern     Parent/Sibling W/ Cabg, Mi Or Angioplasty Before 65f 55m? No     Social History Narrative            Family History:   Reviewed in EMR, unable to confirm with patient since intubated and sedated.    Family History   Problem Relation Age of Onset     Hypertension Mother      Arthritis Mother      Pancreatic Cancer Father      DIABETES Father             Allergies:    No Known Allergies         Medications:     Prior to Admission Medications   Prescriptions Last Dose Informant Patient Reported? Taking?   AZATHIOPRINE PO 2/20/2018 at 2200  Yes Yes   Sig: Take 50 mg by mouth 2 times daily 50 mg tablet, 1.5 tablets by mouth 2 times a day   PREDNISONE PO 2/20/2018 at 0800  Yes Yes   Sig: Take 5 mg by mouth daily  "  TACROLIMUS PO 2/20/2018 at 2200  Yes Yes   Sig: Take 0.1 mg by mouth 2 times daily 0.1 mg capsule, take 4 capsules by mouth 2 times a day.   calcium carbonate (OS-CHELSEY 500 MG [place]. [place]) 1250 MG tablet 2/20/2018 at 2200  Yes Yes   Sig: Take 1 tablet by mouth 2 times daily   multivitamin, therapeutic with minerals (THERA-VIT-M) TABS tablet 2/20/2018 at 0800  Yes Yes   Sig: Take 1 tablet by mouth daily   nystatin (MYCOSTATIN) 610149 UNIT/ML suspension More than a month at Unknown time  Yes No   Sig: Take 100,000 Units by mouth 4 times daily 100,000 unit/ ml, take 4- 6 ml by mouth 4 times a day.   order for DME 2/21/2018 at Unknown time  No Yes   Sig: Equipment being ordered: Oxygen 5 liters at rest, 15 liters with exertion.  Needs portability.  Please provide 2 10-liter concentrators for in the home   sulfamethoxazole-trimethoprim (BACTRIM DS/SEPTRA DS) 800-160 MG per tablet Past Week at Unknown time  Yes Yes   Sig: Take 1 tablet by mouth Every Mon, Wed, Fri Morning      Facility-Administered Medications: None             Review of Systems:   Unable to obtain as patient intubated and sedated.         Physical Exam:   Blood pressure 99/74, pulse 88, temperature 94.5  F (34.7  C), temperature source Esophageal, resp. rate 28, height 1.6 m (5' 3\"), weight 51.3 kg (113 lb 1.5 oz), SpO2 100 %.  Ventilation Mode: CMV/AC  (Continuous Mandatory Ventilation/ Assist Control)  FiO2 (%): 50 %  Rate Set (breaths/minute): 28 breaths/min  Tidal Volume Set (mL): 350 mL  PEEP (cm H2O): 12 cmH2O  Oxygen Concentration (%): 100 %  Resp: 28    General: Intubated, sedated, NAD  Neck: no cervical lymphadenopathy  Chest/Resp: intubated, coarse mechanical breath sounds with bibasilar crackles, no wheezes. Right subclavian cannula  Heart/CV: RRR, normal S1 and S2, no murmurs, 1+ right radial, unable to palpate right radial  Abdomen/GI: soft, NTND, bowel sounds present, no masses  Extremities/MSK: no LE edema, WWP. Right femoral cannula  Neuro: " sedated    Tubes/Lines/Devices: CVC triple lumen, PIV, urinary catheter           Data:   ROUTINE ICU LABS (Last four results)  CMP    Recent Labs  Lab 02/27/18  1500 02/27/18  1013 02/27/18  0537 02/26/18  0506 02/24/18  0606 02/23/18  1559 02/23/18  0655 02/22/18  0503 02/21/18  1439   NA  --   --   --  142 141 138 140 139 142   POTASSIUM  --  4.5  --  4.4 4.2 4.2 4.2 4.5 4.3   CHLORIDE  --   --   --  108 107 106 106 105 107   CO2  --   --   --  29 28 26 28 26 28   ANIONGAP  --   --   --  5 6 7 7 8 7   GLC  --   --   --  76 87 220* 88 110* 96   BUN  --   --   --  25 20 19 25 23 16   CR 0.95  --  0.97 0.93 0.84 0.92 1.15* 1.05* 0.93   GFRESTIMATED 61  --  60* 62 71 63 49* 54* 63   GFRESTBLACK 74  --  72 75 86 77 59* 66 76   CHELSEY  --   --   --  8.5 8.2* 8.4* 8.9 8.9 8.6   MAG  --   --   --   --  1.8  --  1.6 1.6  --    PROTTOTAL  --   --   --   --   --   --   --   --  7.8   ALBUMIN  --   --   --   --   --   --   --   --  3.2*   BILITOTAL  --   --   --   --   --   --   --   --  0.7   ALKPHOS  --   --   --   --   --   --   --   --  58   AST  --   --   --   --   --   --   --   --  26   ALT  --   --   --   --   --   --   --   --  20     CBC    Recent Labs  Lab 02/27/18  2332 02/27/18  1500 02/26/18  0506 02/24/18  0606 02/23/18  0655   WBC 19.4*  --  12.2* 13.3* 10.2   RBC 3.31*  --  3.91 4.02 4.05   HGB 11.0*  --  13.0 13.4 13.6   HCT 34.5*  --  41.5 41.9 41.6   *  --  106* 104* 103*   MCH 33.2*  --  33.2* 33.3* 33.6*   MCHC 31.9  --  31.3* 32.0 32.7   RDW 14.9  --  15.2* 15.1* 15.3*    349 305 339 349     INRNo lab results found in last 7 days.  Arterial Blood Gas    Recent Labs  Lab 02/27/18 2332 02/27/18 2212 02/27/18  1808 02/27/18  1619 02/27/18  1500   PH 7.41 7.38 7.34*  --  7.31*   PCO2 37 39 46*  --  51*   PO2 354* 403* 106*  --  58*   HCO3 24 23 25  --  26   O2PER 40 100 100% AVH5=834% 100     Venous Blood Gas     Recent Labs  Lab 02/27/18  2332 02/27/18 2212 02/27/18  1808 02/27/18  1619    PHV  --   --   --  7.27*   PCO2V  --   --   --  61*   PO2V  --   --   --  34   HCO3V  --   --   --  28   O2PER 40 100 100% ALI6=958%         Results for orders placed or performed during the hospital encounter of 02/21/18   XR Chest Port 1 View    Narrative    Exam:  Chest X-ray 2/21/2018 2:12 PM    History: Acute on chronic hypoxic respiratory failure in pt w/ hx of  ILD;     Comparison: Chest x-ray 2/19/2018    Findings: Portable AP radiographs of the chest. The cardiac silhouette  is normal in size. The pulmonary vasculature is indistinct. Bilateral,  basilar predominant reticulonodular opacities which appear unchanged  from prior exam. No pleural effusion or pneumothorax. Air distention  of loops of bowel in the visualized upper abdomen.      Impression    Impression:  1. Bilateral, basilar predominant reticulonodular opacities,  consistent with known history of interstitial lung disease. Findings  appear similar to the prior exam on 2/19/2018.  2. No definite superimposed acute airspace disease.    I have personally reviewed the examination and initial interpretation  and I agree with the findings.    ANNE MANZANO MD   CT Chest w/o Contrast     Value    Radiologist flags Pneumomediastinum (Urgent)    Narrative    EXAMINATION: Chest CT       CLINICAL HISTORY: Interstitial lung disease.  history of dermatomyositis with associated ILD, pulmonary  hypertension, and seronegative RA . Never smoker per medical chart.    COMPARISON: CT 9/19/2017, 2/18/2015.    TECHNIQUE: CT imaging obtained through the chest without intravenous  contrast. Coronal and axial MIP reformatted images obtained.    FINDINGS:  Heart size is prominent. Trace pericardial effusion. Unchanged mildly  enlarged main pulmonary artery. Enlarged mediastinal lymph nodes,  similar to prior, likely reactive. Calcified right hilar lymph nodes.  Central tracheobronchial tree is patent. No pleural effusion or  pneumothorax. Bilateral interstitial  changes, with lower lobe  predominance. However there is some upper lobe reticulation at the  subpleural level. Mild bibasilar bronchiectasis. No honeycombing.  Interlobular septal thickening. Scattered groundglass changes.  Findings are largely unchanged since prior, however there has been  some decrease in scattered areas of consolidation in the upper lobes.    Bones and soft tissues: No suspicious bone findings. Mild anterior  wedging of upper thoracic vertebral bodies, unchanged. Mild  degenerative changes of thoracic spine. Subcutaneous emphysema along  the left neck, extending exam mediastinum, with pneumomediastinum.    Partially imaged upper abdomen: Limited.      Impression    IMPRESSION:   1. Unchanged findings consistent with known interstitial lung disease.  Findings are inconsistent with UIP pattern. Differential includes  NSIP.  2. Pneumomediastinum extending into the subcutaneous soft tissue of  the left neck. These findings may be seen with interstitial lung  disease, although differential includes infection and barotrauma.  Correlate with any recent procedures.        [Access Center: Pneumomediastinum]    This report will be copied to the [facility] to ensure a  provider acknowledges the finding. Access Center is available Monday  through Friday 8am-3:30 pm.        I have personally reviewed the examination and initial interpretation  and I agree with the findings.    ADAM GONZALEZ MD   XR Chest Port 1 View    Narrative    Exam:  Chest X-ray 2/27/2018 10:28 AM    History: Worsening hypoxia;     Comparison: CT 2/22/2018, chest x-ray 2/21/2018    Findings: Portable AP upright radiograph of the chest. The trachea is  midline. The cardiac silhouette is normal in size. The pulmonary  vasculature is indistinct. Bilateral diffuse reticulonodular opacities  that are basilar predominant, better characterized on CT 2/21/2018. No  pleural effusion or pneumothorax. The upper abdomen  appears  unremarkable.       Impression    Impression:   Bilateral diffuse reticulonodular opacities, better characterized on  CT on 2/20/2019, that are suggestive of interstitial lung disease.  Superimposed infection cannot be excluded.    I have personally reviewed the examination and initial interpretation  and I agree with the findings.    ADAM GONZALEZ MD   XR Chest Port 1 View    Narrative    EXAM: XR CHEST PORT 1 VW  2/27/2018 2:22 PM      HISTORY: check placement of ET tube and central line;     COMPARISON: Chest x-ray from earlier today 2/27/2018    FINDINGS: Portable AP view of the chest obtained. New endotracheal  tube tip projects 4.2 cm from the gee. New right IJ central line  tip projects over the cavoatrial junction. The trachea is midline. The  cardiac silhouette is within normal limits. Low lung volumes. The  costophrenic angles are clear. No pneumothorax. Unchanged diffuse  interstitial fibrotic changes. Slightly increased patchy airspace  opacities in the midlungs.       Impression    IMPRESSION:   1. Appropriate positions of the new endotracheal tube and right IJ  central line.  2. Unchanged diffuse interstitial fibrotic changes bilaterally.  Increased patchy airspace opacities in the midlungs may represent  developing superimposed atelectasis or infection.    I have personally reviewed the examination and initial interpretation  and I agree with the findings.    ADAM GONZALEZ MD   XR Abdomen 1 View    Narrative    Exam: XR ABDOMEN 1 VW, 2/27/2018 2:20 PM    Indication: check placement of OG tube;     Comparison: Chest CT 2/20/2018.    Findings:   No OG tube is visualized. Central line tip at the low SVC. Partially  visualized endotracheal tube tip at the level of the clavicles.    Redemonstrated dilatation of the large bowel with mildly prominent  loops of small bowel in the left upper quadrant. No pneumatosis or  portal venous gas.    Diffuse fibrotic changes in the lungs.      Impression     Impression:   1. No OG tube is visualized on this study.  2. Redemonstrated dilated large bowel, otherwise nonobstructive bowel  gas pattern.    I have personally reviewed the examination and initial interpretation  and I agree with the findings.    CHAUNCEY HUNTER MD ([place])   XR Abdomen Port 1 View    Narrative    Exam: XR ABDOMEN PORT 1 VW, 2/27/2018 3:13 PM    Indication: Check OG placement;     Comparison: Same-day abdominal x-ray    Findings:   OG side hole and tip advanced into the stomach.    Redemonstrated dilatation of the large bowel and stomach. Otherwise  nonobstructive bowel gas pattern. No pneumatosis.    Partially visualized central venous catheter tip at the low SVC.  Findings of interstitial lung disease.      Impression    Impression:   OG tube sidehole and tip in the stomach.    I have personally reviewed the examination and initial interpretation  and I agree with the findings.    CHAUNCEY HUNTER MD ([place])

## 2021-10-01 NOTE — PROGRESS NOTES
US shows  Hepatic steatosis and hepatomegaly to 20.4cm. Normal AST/ALT from labs 1 month ago AST 31, ALT 31, ALK 84. Per chart review does not appear that these have been elevated in past.      CT chest from 5/29/2020 incidentally imaged the liver and showed Diffuse hypodensity of the liver    CTA Ab/Pelv 5/12/2017 liver was normal.        Pt does not drink and does not have any prior h/o liver disease that she is aware of.  Given her h/o Breast CA she and I would both prefer that she see a Hepatologist for further evaluation. Concern for PSC and PBC, LO, has elevated TG so there is also concern for lipid metabolism. DM could also be playing a role as her last A1c with her endocrinologist was 8.5.       Called Pt and discussed this with her

## 2021-10-01 NOTE — TELEPHONE ENCOUNTER
Called pt and discussed referral to Hepatology. All questions answered.   See US results from 10/1/21

## 2021-10-04 ENCOUNTER — TELEPHONE (OUTPATIENT)
Dept: ONCOLOGY | Age: 61
End: 2021-10-04

## 2021-10-04 NOTE — TELEPHONE ENCOUNTER
Patient called and stated that she has a ultrasound of her liver that is very concerning to her. Please advise.     CB# [phone]

## 2021-10-04 NOTE — TELEPHONE ENCOUNTER
Called and spoke to [id number] and scheduled patient for 1st available---which was 12/21/2021 at 3pm with Kristi Pate NP.

## 2021-10-05 DIAGNOSIS — C50.411 MALIGNANT NEOPLASM OF UPPER-OUTER QUADRANT OF RIGHT BREAST IN FEMALE, ESTROGEN RECEPTOR POSITIVE (HCC): Primary | ICD-10-CM

## 2021-10-05 DIAGNOSIS — K76.0 HEPATIC STEATOSIS: ICD-10-CM

## 2021-10-05 DIAGNOSIS — Z17.0 MALIGNANT NEOPLASM OF UPPER-OUTER QUADRANT OF RIGHT BREAST IN FEMALE, ESTROGEN RECEPTOR POSITIVE (HCC): Primary | ICD-10-CM

## 2021-10-05 DIAGNOSIS — R16.0 HEPATOMEGALY: ICD-10-CM

## 2021-10-05 NOTE — TELEPHONE ENCOUNTER
10/5/21 12:25 PM: Received call from patient and advised that Guy Saucedo NP, reviewed her ultrasound results and agrees with PCP's recommendation for the patient to see a liver specialist. Patient stated that the specialist that her PCP recommended cannot see her until December and she does not want to wait that long \"to see if this is cancer. \" Patient stated she is very anxious about this and \"will have a mental breakdown\" if she has to wait that long. Patient wanted to know if Dr. Vidhya Piedra can order further imaging to see if it is cancer-related or if he can refer her to a doctor who can perform imaging. Advised that Dr. Vidhya Piedra and NP will be consulted and the patient will receive a call back. 10/5/21 5:11 PM: Called patient and advised that per Dr. Vidhya Piedra, her ultrasound results showed fatty liver related to diabetes and did not show cancer. Advised that per Dr. Vidhya Piedra, this office can order an abdominal MRI and this is not looking for cancer but rather to help with her appointment with the liver specialist in December. Advised that someone from central scheduling should call the patient to schedule the MRI but if she does not hear from anyone, she can call [phone] to schedule. Patient verbalized understanding and did not have any questions at this time.

## 2021-10-08 ENCOUNTER — TELEPHONE (OUTPATIENT)
Dept: NEUROSURGERY | Age: 61
End: 2021-10-08

## 2021-10-12 ENCOUNTER — HOSPITAL ENCOUNTER (OUTPATIENT)
Dept: MRI IMAGING | Age: 61
Discharge: HOME | End: 2021-10-12
Attending: NURSE PRACTITIONER
Payer: COMMERCIAL

## 2021-10-12 VITALS — WEIGHT: 200 LBS | BODY MASS INDEX: 35.43 KG/M2

## 2021-10-12 DIAGNOSIS — R16.0 HEPATOMEGALY: ICD-10-CM

## 2021-10-12 DIAGNOSIS — K76.0 HEPATIC STEATOSIS: ICD-10-CM

## 2021-10-12 DIAGNOSIS — C50.411 MALIGNANT NEOPLASM OF UPPER-OUTER QUADRANT OF RIGHT BREAST IN FEMALE, ESTROGEN RECEPTOR POSITIVE (HCC): ICD-10-CM

## 2021-10-12 DIAGNOSIS — Z17.0 MALIGNANT NEOPLASM OF UPPER-OUTER QUADRANT OF RIGHT BREAST IN FEMALE, ESTROGEN RECEPTOR POSITIVE (HCC): ICD-10-CM

## 2021-10-12 LAB
ALBUMIN SERPL-MCNC: 4.2 G/DL (ref 3.8–4.9)
ALBUMIN/GLOB SERPL: 2 {RATIO} (ref 1.2–2.2)
ALP SERPL-CCNC: 94 IU/L (ref 44–121)
ALT SERPL-CCNC: 33 IU/L (ref 0–32)
AST SERPL-CCNC: 28 IU/L (ref 0–40)
BILIRUB SERPL-MCNC: 0.5 MG/DL (ref 0–1.2)
BUN SERPL-MCNC: 8 MG/DL (ref 8–27)
BUN/CREAT SERPL: 13 (ref 12–28)
CALCIUM SERPL-MCNC: 9.3 MG/DL (ref 8.7–10.3)
CHLORIDE SERPL-SCNC: 105 MMOL/L (ref 96–106)
CO2 SERPL-SCNC: 25 MMOL/L (ref 20–29)
CREAT SERPL-MCNC: 0.63 MG/DL (ref 0.57–1)
ERYTHROCYTE [DISTWIDTH] IN BLOOD BY AUTOMATED COUNT: 12.9 % (ref 11.7–15.4)
GLOBULIN SER CALC-MCNC: 2.1 G/DL (ref 1.5–4.5)
GLUCOSE SERPL-MCNC: 187 MG/DL (ref 65–99)
HCT VFR BLD AUTO: 44.2 % (ref 34–46.6)
HGB BLD-MCNC: 14.7 G/DL (ref 11.1–15.9)
MCH RBC QN AUTO: 29.8 PG (ref 26.6–33)
MCHC RBC AUTO-ENTMCNC: 33.3 G/DL (ref 31.5–35.7)
MCV RBC AUTO: 90 FL (ref 79–97)
PLATELET # BLD AUTO: 207 X10E3/UL (ref 150–450)
POTASSIUM SERPL-SCNC: 4.2 MMOL/L (ref 3.5–5.2)
PROT SERPL-MCNC: 6.3 G/DL (ref 6–8.5)
RBC # BLD AUTO: 4.93 X10E6/UL (ref 3.77–5.28)
SODIUM SERPL-SCNC: 143 MMOL/L (ref 134–144)
WBC # BLD AUTO: 6.6 X10E3/UL (ref 3.4–10.8)

## 2021-10-12 PROCEDURE — A9585 GADOBUTROL INJECTION: HCPCS | Performed by: NURSE PRACTITIONER

## 2021-10-12 PROCEDURE — 74011250636 HC RX REV CODE- 250/636: Performed by: NURSE PRACTITIONER

## 2021-10-12 PROCEDURE — 74183 MRI ABD W/O CNTR FLWD CNTR: CPT

## 2021-10-12 RX ADMIN — GADOBUTROL 9 ML: 604.72 INJECTION INTRAVENOUS at 09:00

## 2021-10-13 ENCOUNTER — TELEPHONE (OUTPATIENT)
Dept: ONCOLOGY | Age: 61
End: 2021-10-13

## 2021-10-13 ENCOUNTER — TELEPHONE (OUTPATIENT)
Dept: NEUROSURGERY | Age: 61
End: 2021-10-13

## 2021-10-13 NOTE — PROGRESS NOTES
Can you please let her know abdominal MRI confirms no cancer to her liver. She should keep follow up with liver doctor.

## 2021-10-13 NOTE — TELEPHONE ENCOUNTER
[address] at [place]  [phone]    10/13/2021 at 4:52 pm--This user called and spoke with the patient and verified patient ID [id number], I let patient know that her ABD MRI confirms no cancer to her liver and that she should follow up with her liver Doctor. Patient verbalized understanding and did not have any question's or concerns at that time. ----- Message from Mo Corey NP sent at 10/13/2021  3:56 PM EDT -----  Can you please let her know abdominal MRI confirms no cancer to her liver. She should keep follow up with liver doctor.

## 2021-10-13 NOTE — TELEPHONE ENCOUNTER
Spoke to patient to confirm Diagnostic angiogram tomorrow at [facility]. Arrival time 7:00 am at Patient registration. No eating or drinking after midnight and take morning medication with sips of water. Patient stated understanding.

## 2021-10-14 ENCOUNTER — HOSPITAL ENCOUNTER (OUTPATIENT)
Dept: INTERVENTIONAL RADIOLOGY/VASCULAR | Age: 61
Discharge: HOME | End: 2021-10-14
Attending: RADIOLOGY | Admitting: RADIOLOGY
Payer: COMMERCIAL

## 2021-10-14 VITALS
TEMPERATURE: 98.8 F | RESPIRATION RATE: 16 BRPM | SYSTOLIC BLOOD PRESSURE: 107 MMHG | HEART RATE: 70 BPM | HEIGHT: 63 IN | WEIGHT: 200 LBS | OXYGEN SATURATION: 95 % | BODY MASS INDEX: 35.44 KG/M2 | DIASTOLIC BLOOD PRESSURE: 70 MMHG

## 2021-10-14 DIAGNOSIS — H93.A9 PULSATILE TINNITUS: ICD-10-CM

## 2021-10-14 LAB
GLUCOSE BLD STRIP.AUTO-MCNC: 297 MG/DL (ref 65–117)
SERVICE CMNT-IMP: ABNORMAL

## 2021-10-14 PROCEDURE — 74011000636 HC RX REV CODE- 636: Performed by: RADIOLOGY

## 2021-10-14 PROCEDURE — 77030021533 HC CATH ANGI DX PRFMA MRTM -A

## 2021-10-14 PROCEDURE — 77030012468 HC VLV BLEEDBK CNTRL ABBT -B

## 2021-10-14 PROCEDURE — C1769 GUIDE WIRE: HCPCS

## 2021-10-14 PROCEDURE — C1760 CLOSURE DEV, VASC: HCPCS

## 2021-10-14 PROCEDURE — 74011000250 HC RX REV CODE- 250: Performed by: RADIOLOGY

## 2021-10-14 PROCEDURE — 82962 GLUCOSE BLOOD TEST: CPT

## 2021-10-14 PROCEDURE — 2709999900 HC NON-CHARGEABLE SUPPLY

## 2021-10-14 PROCEDURE — 77030008584 HC TOOL GDWRE DEV TERU -A

## 2021-10-14 PROCEDURE — C1894 INTRO/SHEATH, NON-LASER: HCPCS

## 2021-10-14 PROCEDURE — 36200 PLACE CATHETER IN AORTA: CPT

## 2021-10-14 PROCEDURE — 74011250636 HC RX REV CODE- 250/636: Performed by: RADIOLOGY

## 2021-10-14 RX ORDER — FENTANYL CITRATE 50 UG/ML
25-100 INJECTION, SOLUTION INTRAMUSCULAR; INTRAVENOUS
Status: DISCONTINUED | OUTPATIENT
Start: 2021-10-14 | End: 2021-10-14 | Stop reason: HOSPADM

## 2021-10-14 RX ORDER — LIDOCAINE HYDROCHLORIDE 20 MG/ML
400 INJECTION, SOLUTION INFILTRATION; PERINEURAL
Status: COMPLETED | OUTPATIENT
Start: 2021-10-14 | End: 2021-10-14

## 2021-10-14 RX ORDER — MIDAZOLAM HYDROCHLORIDE 1 MG/ML
5 INJECTION, SOLUTION INTRAMUSCULAR; INTRAVENOUS
Status: DISCONTINUED | OUTPATIENT
Start: 2021-10-14 | End: 2021-10-14 | Stop reason: HOSPADM

## 2021-10-14 RX ADMIN — LIDOCAINE HYDROCHLORIDE 20 MG: 20 INJECTION, SOLUTION INFILTRATION; PERINEURAL at 09:08

## 2021-10-14 RX ADMIN — FENTANYL CITRATE 50 MCG: 50 INJECTION INTRAMUSCULAR; INTRAVENOUS at 08:53

## 2021-10-14 RX ADMIN — IOPAMIDOL 112 ML: 612 INJECTION, SOLUTION INTRAVENOUS at 09:29

## 2021-10-14 RX ADMIN — LIDOCAINE HYDROCHLORIDE 15 MG: 20 INJECTION, SOLUTION INFILTRATION; PERINEURAL at 08:56

## 2021-10-14 RX ADMIN — HEPARIN SODIUM 4000 UNITS: 5000 INJECTION, SOLUTION INTRAVENOUS; SUBCUTANEOUS at 08:53

## 2021-10-14 RX ADMIN — MIDAZOLAM 1 MG: 1 INJECTION INTRAMUSCULAR; INTRAVENOUS at 08:52

## 2021-10-14 RX ADMIN — HEPARIN SODIUM 4000 UNITS: 5000 INJECTION, SOLUTION INTRAVENOUS; SUBCUTANEOUS at 08:54

## 2021-10-14 RX ADMIN — HEPARIN SODIUM 4000 UNITS: 5000 INJECTION, SOLUTION INTRAVENOUS; SUBCUTANEOUS at 08:50

## 2021-10-14 NOTE — PROGRESS NOTES
Pt discharge instructions were given to pt by RN. Opportunities for questions and concerns were provided. Pt verbalized understanding of medication use and follow-up. IV dc'd, pt wheeled off unit in no signs of distress, steady gait noted.

## 2021-10-14 NOTE — DISCHARGE INSTRUCTIONS
Patient Education        Brain Angiogram: What to Expect at Home  Your Recovery  A brain angiogram (cerebral angiogram) is a test (also called a procedure) that looks for problems with blood vessels and blood flow in the brain. The doctor inserted a thin, flexible tube (catheter) into a blood vessel in your groin. Or the doctor may have put the catheter in a blood vessel in your arm. Then he or she injected a dye into the catheter. The dye flows into the blood vessel. The dye made the blood vessels show up on a video screen. You may have had this test to see if a blood vessel in the brain is bulging, narrowed, or blocked. The test may also be used to check other symptoms, such as unusual headaches, or to check problems found during a different test.  You may have a bruise where the catheter was put in, and you may feel sore for a day or two. You can do light activities around the house. But don't do anything strenuous for several days. This care sheet gives you a general idea about how long it will take for you to recover. But each person recovers at a different pace. Follow the steps below to feel better as quickly as possible. How can you care for yourself at home? Activity    · Do not do strenuous exercise and do not lift, pull, or push anything heavy until your doctor says it is okay. This may be for a day or two. You can walk around the house and do light activity, such as cooking.     · If the catheter was placed in your groin, try not to walk up stairs for the first couple of days.     · If the catheter was placed in your arm near your wrist, do not bend your wrist deeply for the first couple of days. Be careful using your hand to get into and out of a chair or bed.     · If your doctor recommends it, get more exercise. Walking is a good choice. Bit by bit, increase the amount you walk every day. Try for at least 30 minutes on most days of the week.    Diet    · Drink plenty of fluids to help your body flush out the dye. If you have kidney, heart, or liver disease and have to limit fluids, talk with your doctor before you increase the amount of fluids you drink.     · Keep eating a heart-healthy diet that has lots of fruits, vegetables, and whole grains. If you need help with your diet, talk to your doctor. You also may want to talk to a dietitian. This expert can help you to learn about healthy foods and plan meals. Medicines    · Your doctor will tell you if and when you can restart your medicines. He or she will also give you instructions about taking any new medicines.     · If you take aspirin or some other blood thinner, ask your doctor if and when to start taking it again. Make sure that you understand exactly what your doctor wants you to do.     · Be safe with medicines. Read and follow all instructions on the label. ? If the doctor gave you a prescription medicine for pain, take it as prescribed. ? If you are not taking a prescription pain medicine, ask your doctor if you can take an over-the-counter medicine. Care of the catheter site    · For the first 3 days, keep a bandage over the spot where the catheter was put in.     · Put ice or a cold pack on the area for 10 to 20 minutes at a time. Try to do this every 1 to 2 hours for the next 3 days (when you are awake) or until the swelling goes down. Put a thin cloth between the ice and your skin.     · You may shower 24 to 48 hours after the procedure, if your doctor okays it. Pat the incision dry.     · Do not soak the catheter site until it is healed. Don't take a bath for 1 week, or until your doctor tells you it is okay.     · Watch for bleeding from the site. A small amount of blood (up to the size of a quarter) on the bandage can be normal.     · If you are bleeding, lie down and press on the area for 15 minutes to try to make it stop. If the bleeding does not stop, call your doctor or seek immediate medical care.    Follow-up care is a key part of your treatment and safety. Be sure to make and go to all appointments, and call your doctor if you are having problems. It's also a good idea to know your test results and keep a list of the medicines you take. When should you call for help? Call 911 anytime you think you may need emergency care. For example, call if:    · You passed out (lost consciousness).     · You have severe trouble breathing.     · You have sudden chest pain and shortness of breath, or you cough up blood.     · You have symptoms of a stroke. These may include:  ? Sudden numbness, tingling, weakness, or loss of movement in your face, arm, or leg, especially on only one side of your body. ? Sudden vision changes. ? Sudden trouble speaking. ? Sudden confusion or trouble understanding simple statements. ? Sudden problems with walking or balance. ? A sudden, severe headache that is different from past headaches. Call your doctor now or seek immediate medical care if:    · You are bleeding from the area where the catheter was put in your artery.     · You have a fast-growing, painful lump at the catheter site.     · You have symptoms of infection, such as:  ? Increased pain, swelling, warmth, or redness. ? Red streaks leading from the area. ? Pus draining from the area. ? A fever.     · Your leg, arm, or hand is painful, looks blue, or feels cold, numb, or tingly. Watch closely for any changes in your health, and be sure to contact your doctor if:    · You are not getting better as expected. Where can you learn more? Go to http://www.gray.com/  Enter O249 in the search box to learn more about \"Brain Angiogram: What to Expect at Home. \"  Current as of: July 6, 2021               Content Version: 13.0  © 2456-6210 [employer], [employer]. Care instructions adapted under license by [employer] (which disclaims liability or warranty for this information).  If you have questions about a medical condition or this instruction, always ask your healthcare professional. [place] [address] any warranty or liability for your use of this information.

## 2021-10-14 NOTE — PROGRESS NOTES
Verbal report received and assumed care from Marco A Zhang RN , off-going nurse. Report included SBAR, Kardex, MAR, recent results and pt status. Pt resting comfortably in bed. Groin site CDI, VSS, pulses palpable.

## 2021-10-14 NOTE — BRIEF OP NOTE
NEUROINTERVENTIONAL SURGERY POST-PROCEDURE NOTE    PROCEDURE:  Diagnostic cerebral angiogram     VESSEL(S) STUDIED:  1. Right subclavian artery  2. Right common carotid artery  3. Right internal carotid artery  4. Right external carotid artery  5. Right vertebral artery  6. Left common carotid artery  7. Left internal carotid artery  8. Left external carotid artery  9. Left subclavian artery  10. Left vertebral artery  11. Right common femoral artery VESSEL(S) TREATED:  1. none      PRELIMINARY REPORT & DISPOSITION:   No evidence of aneurysm, intracranial aneurysm or arteriovenous fistula    EBL: 5 cc    COMPLICATIONS:  None    FOLLOW-UP:  SBP less than 140 DATE OF SERVICE:  10/14/2021 10:08 AM     ATTENDING SURGEON(S):  Anupam Mattson MD    ANESTHESIA:   Mild sedation    MEDICATIONS:   See nursing record    PUNCTURE SITE:  Right common femoral artery. Arteriotomy closed with 5F Mynx. Flat x 2 hours. Right leg straight for 4h.           Anupam Mattson MD  [address] [facility]

## 2021-10-14 NOTE — PROGRESS NOTES
Pt arrives ambulated unassisted to angio department accompanied by son for diagnostic cerebral angiogram procedure. All assessments completed and consent was reviewed. Education given was regarding procedure, modeate sedation, post-procedure care and  management/follow-up. Opportunity for questions was provided and all questions and concerns were addressed. Pt self injected 30u insulin, OK per Dr Briceño   21 :  Poet procedure, pt tolerated well without complaint. Resting quietly in holding area. Will continue to monitor.

## 2021-10-26 NOTE — PROGRESS NOTES
[address] [place] [address] [address] Sau[address]o [address]   Office [phone], Fax [phone]    Subjective:     Chief Complaint   Patient presents with    Follow-up     Patient is here to follow up on her depression. States she feels emotionless on the 40mg Prozac, so she would rather not take that dose every day. As far as depression-\"I have good days and bad days\". Also, discussed Dr. Luna Doe taking over her Adderall rx and wonders if she will do that. Agrees to get flu shot today. History provided by patient     HPI:  Judy Núñez is a 62 y.o. WHITE/NON- female with past medical history of  Breast cancer s/p BL mastectomy, hypothyroid, DM follow by endocrine, HLD, Depression presents for   Chief Complaint   Patient presents with    Follow-up     Patient is here to follow up on her depression. States she feels emotionless on the 40mg Prozac, so she would rather not take that dose every day. As far as depression-\"I have good days and bad days\". Also, discussed Dr. Luna Doe taking over her Adderall rx and wonders if she will do that. Agrees to get flu shot today. 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 Domingo Badillo 8/17/21. No thoughts SI/HI. Been worse recently in the last year. On Prozac 20mg for many years, increased to Prozac 40mg last visit but she states this has been too much and mkes her feel numb. Been on wellbutrin in the past but this didn't work and made her depression worse. States she has scheduled an appt with counselor at [address] [place] [address] and had 3 visits thus far.        Psychiatric disorder dx: depression   History of psych admission:none  Prior Medications: Wellbutrin (made depression worse), Prozac   History of manic or hypomanic episode:no  History of Suicide: no  History of post partum depression: no  Substance use: no  SI: no  HI: no  AVH: no      B12 deficiency   B12 on check >2000. On B12 injections.   Can likely stop these and switch to daily PO B12.       Hepatomegaly   MRI wnl.  US showed hepatomegaly. She saw Dr. Cassidy Saavedra. Who is planning on getting Liver elastography. He thinks it could be 2/2 LO. Has follow up scheduled. Social History     Socioeconomic History    Marital status: SINGLE     Spouse name: Not on file    Number of children: 2    Years of education: 15    Highest education level: Not on file   Occupational History    Occupation: Book Keeper/   Tobacco Use    Smoking status: Former Smoker     Packs/day: 1.00     Years: 30.00     Pack years: 30.00     Quit date:      Years since quittin.8    Smokeless tobacco: Never Used   Vaping Use    Vaping Use: Never used   Substance and Sexual Activity    Alcohol use: No    Drug use: No    Sexual activity: Not on file   Other Topics Concern     Service No    Blood Transfusions No    Caffeine Concern No    Occupational Exposure No    Hobby Hazards No    Sleep Concern No    Stress Concern No    Weight Concern No    Special Diet No    Back Care No    Exercise No    Bike Helmet No    Seat Belt No    Self-Exams No   Social History Narrative    Lives in [place] with 24 yo son and son's girlfriend. Has a 31 yo daughter.  for many years. Works as a  at an accounting firm. Likes to too.me. Social Determinants of Health     Financial Resource Strain: Low Risk     Difficulty of Paying Living Expenses: Not very hard   Food Insecurity: No Food Insecurity    Worried About Running Out of Food in the Last Year: Never true    Adalid of Food in the Last Year: Never true   Transportation Needs: No Transportation Needs    Lack of Transportation (Medical): No    Lack of Transportation (Non-Medical): No   Physical Activity: Inactive    Days of Exercise per Week: 0 days    Minutes of Exercise per Session: 0 min   Stress: No Stress Concern Present    Feeling of Stress : Only a little   Social Connections:  Moderately Isolated    Frequency of Communication with Friends and Family: Twice a week    Frequency of Social Gatherings with Friends and Family: Once a week    Attends [employer] Services: 1 to 4 times per year    Active Member of [address] [place] [address] Andel or Organizations: No    Attends Club or Organization Meetings: Never    Marital Status:    Intimate Partner Violence: Unknown    Fear of Current or Ex-Partner: Patient refused    Emotionally Abused: Patient refused    Physically Abused: Patient refused    Sexually Abused: Patient refused         Review of Systems   Constitutional: Negative for chills, fever and malaise/fatigue. Eyes: Negative for blurred vision and double vision. Respiratory: Negative for shortness of breath. Cardiovascular: Negative for chest pain and palpitations. Gastrointestinal: Negative for abdominal pain, diarrhea, nausea and vomiting. Musculoskeletal: Negative for myalgias. Neurological: Negative for dizziness, tremors and headaches. Psychiatric/Behavioral: Negative for depression, hallucinations, substance abuse and suicidal ideas. The patient is nervous/anxious and has insomnia. Objective:     Visit Vitals  /67 (BP 1 Location: Left arm, BP Patient Position: Sitting, BP Cuff Size: Adult long)   Pulse 85   Resp 16   Ht 5' 3\" (1.6 m)   Wt 219 lb 9.6 oz (99.6 kg)   SpO2 100%   BMI 38.90 kg/m²          Physical Exam  Vitals and nursing note reviewed. Constitutional:       General: She is not in acute distress. Appearance: She is well-developed. HENT:      Head: Normocephalic and atraumatic. Eyes:      Conjunctiva/sclera: Conjunctivae normal.   Cardiovascular:      Rate and Rhythm: Normal rate and regular rhythm. Heart sounds: Normal heart sounds. No murmur heard. No friction rub. No gallop. Pulmonary:      Effort: Pulmonary effort is normal.      Breath sounds: Normal breath sounds. No wheezing. Musculoskeletal:         General: Normal range of motion. Skin:     General: Skin is warm and dry. Findings: No rash. Neurological:      Mental Status: She is alert. Pertinent Labs/Studies:     No flowsheet data found. PHQ 9 Score: 16 (9/24/2021  2:07 PM)  Thoughts of being better off dead, or hurting yourself in some way: 0 (9/24/2021  2:07 PM)       Assessment and orders:       ICD-10-CM ICD-9-CM    1. Encounter for immunization  Z23 V03.89 INFLUENZA VIRUS VAC QUAD,SPLIT,PRESV FREE SYRINGE IM      ID IMMUNIZ ADMIN,1 SINGLE/COMB VAC/TOXOID   2. Insomnia due to anxiety and fear  F51.05 300.20 traZODone (DESYREL) 50 mg tablet    F40.9 327.02    3. Depression due to physical illness  F06.31 293.83 traZODone (DESYREL) 50 mg tablet      FLUoxetine (PROzac) 10 mg capsule      FLUoxetine (PROzac) 20 mg capsule     1. Encounter for immunization  - INFLUENZA VIRUS VAC QUAD,SPLIT,PRESV FREE SYRINGE IM  - ID IMMUNIZ ADMIN,1 SINGLE/COMB VAC/TOXOID    2. Insomnia due to anxiety and fear  Significant difficulty sleeping. Taking 75mg Benadryl each night without much help. Likely due to depression uncontrolled. - advised to stop Benadryl   - Start traZODone (DESYREL) 50 mg tablet; Take 1 Tablet by mouth nightly for 90 doses. Dispense: 90 Tablet; Refill: 0    3. Depression due to physical illness  Still with severe depression. Would like to stay on Prozac but wants to go down to 30mg to see if this helps. -STOP Prozac 40mg. - traZODone (DESYREL) 50 mg tablet; Take 1 Tablet by mouth nightly for 90 doses. Dispense: 90 Tablet; Refill: 0  - FLUoxetine (PROzac) 10 mg capsule; Take 1 Capsule by mouth daily for 90 days. Dispense: 90 Capsule; Refill: 1  - FLUoxetine (PROzac) 20 mg capsule; Take 1 Capsule by mouth daily for 90 days. Dispense: 90 Capsule; Refill: 1  - no SI/HI   -continue meeting with counselor.       4. B12 deficiency  B12 >2000.    -Stop B12 injections   -Start PO b12 daily   -recheck in 6 months     Follow Up: 2 weeks- discuss depression & insomnia. Pt was discussed with Dr. Isabella Jaimes (attending physician). I have reviewed patient medical and social history and medications. I have reviewed pertinent labs results and other data. I have discussed the diagnosis with the patient and the intended plan as seen in the above orders. The patient has received an after-visit summary and questions were answered concerning future plans. I have discussed medication side effects and warnings with the patient as well.     Rahel Thorpe, DO  Resident [address] [place] [address]  10/27/21

## 2021-10-27 ENCOUNTER — OFFICE VISIT (OUTPATIENT)
Dept: FAMILY MEDICINE CLINIC | Age: 61
End: 2021-10-27
Payer: COMMERCIAL

## 2021-10-27 VITALS
HEIGHT: 63 IN | RESPIRATION RATE: 16 BRPM | BODY MASS INDEX: 38.91 KG/M2 | SYSTOLIC BLOOD PRESSURE: 128 MMHG | HEART RATE: 85 BPM | DIASTOLIC BLOOD PRESSURE: 67 MMHG | WEIGHT: 219.6 LBS | OXYGEN SATURATION: 100 %

## 2021-10-27 DIAGNOSIS — E53.8 B12 DEFICIENCY: ICD-10-CM

## 2021-10-27 DIAGNOSIS — Z23 ENCOUNTER FOR IMMUNIZATION: Primary | ICD-10-CM

## 2021-10-27 DIAGNOSIS — F40.9 INSOMNIA DUE TO ANXIETY AND FEAR: ICD-10-CM

## 2021-10-27 DIAGNOSIS — F06.31 DEPRESSION DUE TO PHYSICAL ILLNESS: ICD-10-CM

## 2021-10-27 DIAGNOSIS — F51.05 INSOMNIA DUE TO ANXIETY AND FEAR: ICD-10-CM

## 2021-10-27 PROCEDURE — 99214 OFFICE O/P EST MOD 30 MIN: CPT

## 2021-10-27 PROCEDURE — 90686 IIV4 VACC NO PRSV 0.5 ML IM: CPT

## 2021-10-27 RX ORDER — SYRINGE WITH NEEDLE, 1 ML 25GX5/8"
SYRINGE, EMPTY DISPOSABLE MISCELLANEOUS
COMMUNITY
Start: 2021-10-12 | End: 2021-12-21

## 2021-10-27 RX ORDER — TRAZODONE HYDROCHLORIDE 50 MG/1
50 TABLET ORAL
Qty: 90 TABLET | Refills: 0 | Status: SHIPPED | OUTPATIENT
Start: 2021-10-27 | End: 2021-12-21

## 2021-10-27 RX ORDER — FLUOXETINE 10 MG/1
10 CAPSULE ORAL DAILY
Qty: 90 CAPSULE | Refills: 1 | Status: SHIPPED | OUTPATIENT
Start: 2021-10-27 | End: 2022-01-25

## 2021-10-27 RX ORDER — FLUOXETINE HYDROCHLORIDE 20 MG/1
20 CAPSULE ORAL DAILY
Qty: 90 CAPSULE | Refills: 1 | Status: SHIPPED | OUTPATIENT
Start: 2021-10-27 | End: 2022-01-25

## 2021-10-27 NOTE — PATIENT INSTRUCTIONS
1. Stop prozac 40mg     2. Start Prozac 30mg (20mg and 10mg tablet) once daily     3. Start Trazodone 50mg at bedtime to help with sleep     4. Stop B12 injections. Start Over the counter B12 daily supplement.

## 2021-10-28 NOTE — PROGRESS NOTES
[address] Medicine Residency Attending Addendum:  I saw and evaluated the patient on the day of the encounter with Waldemar Primrose, DO  , performing the key elements of the service. I discussed the findings, assessment and plan with the resident and agree with the resident's findings and plan as documented in the resident's note.       Abby Howell MD, CAQSM, RMSK

## 2021-11-25 NOTE — PROGRESS NOTES
[address] [facility] [address] [place] [address] Sena Viramontes    Office [phone], Fax [phone]    Subjective:     Chief Complaint   Patient presents with    Follow Up Chronic Condition     states she feels like she's doing better on prozac 30 mg. takes her trazodone around 9pm, but she doesn't fall asleep until 1-2am    Dizziness     states she has been having issues with her balance for the past few weeks. denies falls, but she sometimes she has to grab onto the wall to steady herself. happens randomly and has been increasing in frequency     History provided by patient     HPI:  Mike Brown is a 62 y.o. WHITE/NON- female with past medical history of  Breast cancer s/p BL mastectomy, hypothyroid, DM follow by endocrine, HLD, Depression presents for   Chief Complaint   Patient presents with    Follow Up Chronic Condition     states she feels like she's doing better on prozac 30 mg. takes her trazodone around 9pm, but she doesn't fall asleep until 1-2am    Dizziness     states she has been having issues with her balance for the past few weeks. denies falls, but she sometimes she has to grab onto the wall to steady herself. 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 Danitza Dawson 8/17/21. No thoughts SI/HI. Been worse recently in the last year. On Prozac 20mg for many years, increased to Prozac 40mg but last visit but she states this has been too much and mkes her feel numb so went back down to 30mg. States she feels better on this dose. Continues to follow with counselor        Psychiatric disorder dx: depression   History of psych admission:none  Prior Medications: Wellbutrin (made depression worse), Prozac   History of manic or hypomanic episode:no  History of Suicide: no  History of post partum depression: no  Substance use: no  SI: no  HI: no  AVH: no    Insomnia:   Started her on Trazodone 50mg last visit.   Takes this at 9:00PM but still having issues falling asleep and often doesn't fall asleep until 1AM.        Dizziness  For the past 2 weeks she has been having intermittent episodes where she feels dizzy and off balance and has to hold on to something. Yesterday this happened about 5 times. She gets dizzy going from sitting to standing but mainly it occurs when she is walking or moving around. No syncope and no LOC. Never fallen or hit her head. She just saw her cardiologist last Wednesday and states there were no concerning findings. Denies chest pain, SOB, vision changes, hearing changes, n/v/d, n/t/w in arms or legs. Social History     Socioeconomic History    Marital status: SINGLE     Spouse name: Not on file    Number of children: 2    Years of education: 15    Highest education level: Not on file   Occupational History    Occupation: Book Keeper/   Tobacco Use    Smoking status: Former Smoker     Packs/day: 1.00     Years: 30.00     Pack years: 30.00     Quit date:      Years since quittin.9    Smokeless tobacco: Never Used   Vaping Use    Vaping Use: Never used   Substance and Sexual Activity    Alcohol use: No    Drug use: No    Sexual activity: Not on file   Other Topics Concern     Service No    Blood Transfusions No    Caffeine Concern No    Occupational Exposure No    Hobby Hazards No    Sleep Concern No    Stress Concern No    Weight Concern No    Special Diet No    Back Care No    Exercise No    Bike Helmet No    Seat Belt No    Self-Exams No   Social History Narrative    Lives in [place] with 26 yo son and son's girlfriend. Has a 31 yo daughter.  for many years. Works as a  at an accounting firm. Likes to amado.      Social Determinants of Health     Financial Resource Strain: Low Risk     Difficulty of Paying Living Expenses: Not very hard   Food Insecurity: No Food Insecurity    Worried About Running Out of Food in the Last Year: Never true    Ran Out of Food in the Last Year: Never true   Transportation Needs: No Transportation Needs    Lack of Transportation (Medical): No    Lack of Transportation (Non-Medical): No   Physical Activity: Inactive    Days of Exercise per Week: 0 days    Minutes of Exercise per Session: 0 min   Stress: No Stress Concern Present    Feeling of Stress : Only a little   Social Connections: Moderately Isolated    Frequency of Communication with Friends and Family: Twice a week    Frequency of Social Gatherings with Friends and Family: Once a week    Attends Christian Services: 1 to 4 times per year    Active Member of [address] [place] [address] [employer] Apps or Organizations: No    Attends Club or Organization Meetings: Never    Marital Status:    Intimate Partner Violence: Unknown    Fear of Current or Ex-Partner: Patient refused    Emotionally Abused: Patient refused    Physically Abused: Patient refused    Sexually Abused: Patient refused   Housing Stability:     Unable to Pay for Housing in the Last Year: Not on file    Number of [place] in the Last Year: Not on file    Unstable Housing in the Last Year: Not on file         Review of Systems   Constitutional: Negative for chills, fever and malaise/fatigue. Eyes: Negative for blurred vision and double vision. Respiratory: Negative for shortness of breath. Cardiovascular: Negative for chest pain and palpitations. Gastrointestinal: Negative for abdominal pain, diarrhea, nausea and vomiting. Musculoskeletal: Negative for myalgias. Neurological: Positive for dizziness. Negative for tremors and headaches. Psychiatric/Behavioral: Negative for depression, hallucinations, substance abuse and suicidal ideas. The patient is nervous/anxious and has insomnia. Objective:     Visit Vitals  /68   Pulse 88   Wt 221 lb 6.4 oz (100.4 kg)   SpO2 94%   BMI 39.22 kg/m²          Physical Exam  Vitals and nursing note reviewed.    Constitutional: General: She is not in acute distress. Appearance: She is well-developed. HENT:      Head: Normocephalic and atraumatic. Right Ear: Tympanic membrane normal.      Left Ear: Tympanic membrane normal.      Mouth/Throat:      Mouth: Mucous membranes are moist.      Pharynx: Oropharynx is clear. Eyes:      Conjunctiva/sclera: Conjunctivae normal.   Cardiovascular:      Rate and Rhythm: Normal rate and regular rhythm. Heart sounds: Normal heart sounds. No murmur heard. No friction rub. No gallop. Pulmonary:      Effort: Pulmonary effort is normal.      Breath sounds: Normal breath sounds. No wheezing. Musculoskeletal:         General: Normal range of motion. Cervical back: Normal range of motion. Skin:     General: Skin is warm and dry. Findings: No rash. Neurological:      General: No focal deficit present. Mental Status: She is alert and oriented to person, place, and time. Cranial Nerves: No cranial nerve deficit. Sensory: No sensory deficit. Comments: Unable to tolerate henry hallpike however became dizzy when laying flat for orthostatic blood pressures. EKG- NSR rate 90. QTc 428. No ST elevation or T wave inversion. Compared to 2014 no significant change. Assessment and orders:         1. Insomnia due to anxiety and fear  -cut trazodone in half and start taking it earlier in the evening.   -see #3       2. Depression due to physical illness- Improved. -continue Prozac 30mg   -continue meeting with counselor. 3. Dizziness   No abnormal EKG findings. Orthostatic Bps are negative. Saw cardiology last week without concerns. Do not suspect this is cardiac related. Suspect this new dizziness could be vertigo vs side effect of the Trazodone I started her on 2 weeks ago as the timeline corresponds.    -hand out on BPPV exercises   -advised cutting trazodone in half.     - if still having sx's advised to cut out trazodone completely and see if this resolves. Follow Up: 3 months - CPE and labs         Pt was discussed with Dr. Mely Olsen  (attending physician). I have reviewed patient medical and social history and medications. I have reviewed pertinent labs results and other data. I have discussed the diagnosis with the patient and the intended plan as seen in the above orders. The patient has received an after-visit summary and questions were answered concerning future plans. I have discussed medication side effects and warnings with the patient as well.     Sarah Farmer, DO  Resident Rene Mulligan [facility]  11/26/21

## 2021-11-26 ENCOUNTER — OFFICE VISIT (OUTPATIENT)
Dept: FAMILY MEDICINE CLINIC | Age: 61
End: 2021-11-26
Payer: COMMERCIAL

## 2021-11-26 VITALS
OXYGEN SATURATION: 94 % | HEART RATE: 88 BPM | WEIGHT: 221.4 LBS | DIASTOLIC BLOOD PRESSURE: 68 MMHG | SYSTOLIC BLOOD PRESSURE: 105 MMHG | BODY MASS INDEX: 39.22 KG/M2

## 2021-11-26 DIAGNOSIS — R42 DIZZINESS: Primary | ICD-10-CM

## 2021-11-26 PROCEDURE — 93000 ELECTROCARDIOGRAM COMPLETE: CPT | Performed by: STUDENT IN AN ORGANIZED HEALTH CARE EDUCATION/TRAINING PROGRAM

## 2021-11-26 PROCEDURE — 99214 OFFICE O/P EST MOD 30 MIN: CPT | Performed by: STUDENT IN AN ORGANIZED HEALTH CARE EDUCATION/TRAINING PROGRAM

## 2021-11-26 NOTE — PROGRESS NOTES
Juan Pablo Sanchez is a 62 y.o. female    Chief Complaint   Patient presents with    Follow Up Chronic Condition     states she feels like she's doing better on prozac 30 mg. takes her trazodone around 9pm, but she doesn't fall asleep until 1-2am    Dizziness     states she has been having issues with her balance for the past few weeks. denies falls, but she sometimes she has to grab onto the wall to steady herself. happens randomly and has been increasing in frequency       1. Have you been to the ER, urgent care clinic since your last visit? Hospitalized since your last visit? No    2. Have you seen or consulted any other health care providers outside of the [address] [place] [address] since your last visit? Include any pap smears or colon screening. No      Visit Vitals  /68   Pulse 88   Wt 221 lb 6.4 oz (100.4 kg)   SpO2 94%   BMI 39.22 kg/m²           Health Maintenance Due   Topic Date Due    DTaP/Tdap/Td series (1 - Tdap) Never done    Cervical cancer screen  Never done    Shingrix Vaccine Age 50> (1 of 2) Never done    Low dose CT lung screening  Never done    Foot Exam Q1  08/15/2014    Breast Cancer Screen Mammogram  05/22/2015    Eye Exam Retinal or Dilated  06/15/2015    A1C test (Diabetic or Prediabetic)  07/28/2015    MICROALBUMIN Q1  07/28/2015    Colorectal Cancer Screening Combo  06/15/2020         Medication Reconciliation completed, changes noted.   Please  Update medication list.

## 2021-11-26 NOTE — PATIENT INSTRUCTIONS
Epley Maneuver at Home for Vertigo: Exercises  Introduction  Vertigo is a spinning or whirling sensation when you move your head. Your doctor may have moved you in different positions to help your vertigo get better faster. This is called the Epley maneuver. Your doctor also may have asked you to do these exercises at home. Do the exercises as often as your doctor recommends. If your vertigo is getting worse, your doctor may have you change the exercise or stop it. Step 1  Step 1    1. Sit on the edge of a bed or sofa. Step 2    1. Turn your head 45 degrees in the direction your doctor told you to. This should be toward the ear that causes the most vertigo for you. In this picture, the woman is turning toward her left ear. Step 3    1. Tilt yourself backward until you are lying on your back. Your head should still be at a 45-degree turn. Your head should be about midway between looking straight ahead and looking out to your side. Hold for 30 seconds. If you have vertigo, stay in this position until it stops. Step 4    1. Turn your head 90 degrees toward the ear that has the least vertigo. In this picture, the woman is turning to the right because she has vertigo on her left side. The point of your chin should be raised and over your shoulder. Hold for 30 seconds. Step 5    1. Roll onto the side with the least vertigo. You should now be looking at the floor. Hold for 30 seconds. Follow-up care is a key part of your treatment and safety. Be sure to make and go to all appointments, and call your doctor if you are having problems. It's also a good idea to know your test results and keep a list of the medicines you take. Where can you learn more? Go to http://www.gray.com/  Enter P834 in the search box to learn more about \"Epley Maneuver at Home for Vertigo: Exercises. \"  Current as of: April 8, 2021               Content Version: 13.0  © 9417-7591 Healthwise, [facility].    [facility] instructions adapted under license by [employer] (which disclaims liability or warranty for this information). If you have questions about a medical condition or this instruction, always ask your healthcare professional. [address] any warranty or liability for your use of this information.

## 2021-12-09 ENCOUNTER — PATIENT MESSAGE (OUTPATIENT)
Dept: FAMILY MEDICINE CLINIC | Age: 61
End: 2021-12-09

## 2021-12-09 DIAGNOSIS — R41.840 DIFFICULTY CONCENTRATING: Primary | ICD-10-CM

## 2021-12-09 DIAGNOSIS — R41.89 BRAIN FOG: ICD-10-CM

## 2021-12-10 PROBLEM — R41.89 BRAIN FOG: Status: ACTIVE | Noted: 2021-12-10

## 2021-12-10 PROBLEM — R41.840 DIFFICULTY CONCENTRATING: Status: ACTIVE | Noted: 2021-12-10

## 2021-12-10 RX ORDER — DEXTROAMPHETAMINE SULFATE, DEXTROAMPHETAMINE SACCHARATE, AMPHETAMINE SULFATE AND AMPHETAMINE ASPARTATE 6.25; 6.25; 6.25; 6.25 MG/1; MG/1; MG/1; MG/1
CAPSULE, EXTENDED RELEASE ORAL
Qty: 30 CAPSULE | Refills: 0 | Status: SHIPPED | OUTPATIENT
Start: 2021-12-10 | End: 2022-01-10

## 2021-12-10 NOTE — TELEPHONE ENCOUNTER
From: Salvador Parker  To: Meme Vilchis DO  Sent: 12/9/2021 1:29 PM EST  Subject: Adderall    Dr Segundo Jason,  It is about time to refill the Adderall. Last time filled was 11/13/2021. To my knowledge Robin Heath sent over all of the paperwork you needed to take over perscribing it. My perscription is Adderall XR 25MG 1 a day.   [facility] [address] [place] [address] [phone]  Let me know if you have any questions  Gume Rangel  [phone]

## 2021-12-10 NOTE — TELEPHONE ENCOUNTER
Notes reviewed from prior Neuro oncologist from [facility] 2/2021 (scanned in Media). Pt has been having chemo fog/brain fog with diffculty concentrating that started during chemo and has persisted since. She follows closely with Dr. Ke Alejo. Has been taking Adderall 25mg daily to help with work and to help her focus. PDMP reviewed and appropriate. Will take over prescribing this medication. Pt needs compliance drug and controlled substance agreement signed at a follow up in 3 months. Hanna Elliott D.O.    Family Medicine Resident PGY3

## 2021-12-21 ENCOUNTER — TELEPHONE (OUTPATIENT)
Dept: HEMATOLOGY | Age: 61
End: 2021-12-21

## 2021-12-21 ENCOUNTER — OFFICE VISIT (OUTPATIENT)
Dept: HEMATOLOGY | Age: 61
End: 2021-12-21
Payer: COMMERCIAL

## 2021-12-21 VITALS
TEMPERATURE: 96.2 F | DIASTOLIC BLOOD PRESSURE: 64 MMHG | HEIGHT: 63 IN | BODY MASS INDEX: 38.59 KG/M2 | WEIGHT: 217.8 LBS | SYSTOLIC BLOOD PRESSURE: 125 MMHG | HEART RATE: 88 BPM

## 2021-12-21 DIAGNOSIS — R74.8 ELEVATED LIVER ENZYMES: ICD-10-CM

## 2021-12-21 DIAGNOSIS — I25.810 CORONARY ARTERY DISEASE INVOLVING CORONARY BYPASS GRAFT OF NATIVE HEART WITHOUT ANGINA PECTORIS: ICD-10-CM

## 2021-12-21 DIAGNOSIS — K76.0 NAFLD (NONALCOHOLIC FATTY LIVER DISEASE): Primary | ICD-10-CM

## 2021-12-21 PROCEDURE — 99204 OFFICE O/P NEW MOD 45 MIN: CPT | Performed by: NURSE PRACTITIONER

## 2021-12-21 RX ORDER — BLOOD-GLUCOSE TRANSMITTER
EACH MISCELLANEOUS
COMMUNITY
Start: 2021-10-11 | End: 2022-08-01

## 2021-12-21 RX ORDER — BLOOD-GLUCOSE SENSOR
EACH MISCELLANEOUS
COMMUNITY
Start: 2021-10-11

## 2021-12-21 NOTE — PROGRESS NOTES
[facility] [address]      Amor Castellon MD, Cristina Blank, Stephanie Hatch MD, MPH      Flores Robledo, PA-BREANNA Mak, ACN-BC     Kristi Pate, [facility]   Colton Conde, FNP-C    Ashley Foley, [facility]       Ravi Grier Epifanio [address]    at [address], [address] [place] [address], [facility] 22.    [phone]    FAX: [address] [place] [address], [address]    [phone]    FAX: [phone]       Patient Care Team:  Allen Spears DO as PCP - General (Family Medicine)  Andria Gann MD as Physician (Endocrinology)      Problem List  Date Reviewed: 12/26/2021          Codes Class Noted    NAFLD (nonalcoholic fatty liver disease) ICD-10-CM: K76.0  ICD-9-CM: 571.8  12/26/2021        Tinnitus of both ears ICD-10-CM: H93.13  ICD-9-CM: 388.30  12/26/2021        Coronary artery disease involving coronary bypass graft of native heart without angina pectoris ICD-10-CM: I25.810  ICD-9-CM: 414.05  12/21/2021    Overview Signed 12/21/2021  3:12 PM by Kristi Zuleta NP     1/2020              Brain fog ICD-10-CM: R41.89  ICD-9-CM: 799.59  12/10/2021    Overview Addendum 12/10/2021  3:15 PM by Allen Spears,      Onset since starting chemo for breast cancer   See notes in Media from 2/2021 from [facility]              Difficulty concentrating ICD-10-CM: R41.840  ICD-9-CM: 799.51  12/10/2021    Overview Signed 12/10/2021  3:16 PM by Allen Spears DO     Onset while on chemo and persisted since   Takes Adderall 25mg XL daily  See neuro oncology notes in Media 2/2021              B12 deficiency ICD-10-CM: E53.8  ICD-9-CM: 266.2  10/27/2021        Depression due to physical illness ICD-10-CM: F06.31  ICD-9-CM: 293.83  10/27/2021 Insomnia due to anxiety and fear ICD-10-CM: F51.05, F40.9  ICD-9-CM: 300.20, 327.02  10/27/2021        Chest wall mass ICD-10-CM: R22.2  ICD-9-CM: 786.6  5/29/2020        Severe obesity (BMI 35.0-39. 9) ICD-10-CM: E66.01  ICD-9-CM: 278.01  6/13/2018        Lymphedema of upper extremity following lymphadenectomy ICD-10-CM: E89.89, I89.0  ICD-9-CM: 997.99, 457.1  7/6/2015        Osteopenia (Chronic) ICD-10-CM: M85.80  ICD-9-CM: 733.90  3/15/2014        Breast cancer of upper-outer quadrant of right female breast [facility]) ICD-10-CM: C50.411  ICD-9-CM: 174.4  3/5/2014        Type 2 diabetes mellitus with neurologic complication, with long-term current use of insulin (HCC) ICD-10-CM: E11.49, Z79.4  ICD-9-CM: 250.60, V58.67  Unknown        Other and unspecified hyperlipidemia ICD-10-CM: E78.5  ICD-9-CM: 272.4  9/30/2010        Unspecified hypothyroidism ICD-10-CM: E03.9  ICD-9-CM: 244.9  9/30/2010              The clinicians listed above have asked me to see Yoshi Montes in consultation regarding suspected fatty liver and its management. All medical records sent by the referring physicians were reviewed including imaging studies and pathology. The patient is a 62 y.o.  female who was found to have an enlarged liver on palpation and imaging 9/2021. Serologic evaluation for markers of chronic liver disease were negative. Ultrasound was performed in 11/01/2021. The results of the imaging suggested fatty liver disease. A liver biopsy was performed at [facility] in 12/06/2021. The slides have not been reviewed by Dr. Jason Guzman. The results demonstrate Bridging Fibrosis and steatohepatitis involving approximately 25% of parenchyma. Ballooning is present. Of note, the patient has a history of CAD with cardiac bypass surgery 1/2020. She notes a 20 lb weight gain since surgery. She also received chemotherapy and radiation for breast cancer in 2014. Tamoxifen was discontinued in 2019.      The patient has the following symptoms which could be attributed to the liver disorder:    Pain in the right side over the liver, swelling of the lower extremities,     The patient is not experiencing the following symptoms which are commonly seen in this liver disorder:  Nausea, vomiting, yellowing of the eyes or skin, itching, or problems concentrating. The patient completes all daily activities without any functional limitations. She notes chronic Tinnitus. ASSESSMENT AND PLAN:  NAFLD  The diagnosis is based upon liver biopsy, imaging, features of metabolic syndrome, and   serologic studies that are negative for other causes of chronic liver disease. A liver biopsy performed in 12/2021 demonstrates Stage 3 bridging fibrosis. Will request slides for Dr. Jordan Quintana to review. Liver transaminases are elevated. ALP is normal.  Liver function is normal.  The platelet count is   normal.      Based upon laboratory studies, liver biopsy and imaging the patient appears to have advanced liver disease but not cirrhosis. Will perform laboratory testing to monitor liver function and degree of liver injury. This included BMP, hepatic panel, CBC with platelet count, and INR. If the patient looses 20% of current body weight, which is 42 pounds, down to a weight of of 175 pounds, steatosis should resolve. Once all steatosis has resolved inflammation will resolve. Fibrosis will gradually resolve and the liver could eventually be normal.     There is currently no FDA approved medical treatment for fatty liver, NALFD or LO. The only medical treatments for LO are though clinical trials. The patient has declined to participate in a clinical trial and would like to try to lose weight through changes in diet and life style. The Fibroscan can be repeated annually or as often as clinically indicated to assess for fibrosis progression and/or regression.     Counseling for diet and weight loss in patients with confirmed or suspected NAFLD  The patient was counseled regarding diet and exercise to achieve weight loss. The best diet for patients with fatty liver is one very low in carbohydrates and enriched with protein such as a low carbohydrate Mediterranean diet. The patient was told not to consume any food products and drinks containing fructose as this enhances hepatic fat synthesis. There is no medication or vitamin supplements that we advocate for LO. Using glitazones in patients without diabetes mellitus has been shown to reduce fat content in the liver but has no effect on fibrosis and is associated with weight gain. Vitamin E has also been used but the data is not very good and most experts no longer advocate this. Screening for Hepatocellular Carcinoma  HCC screening is not necessary if the patient has no evidence of cirrhosis. Treatment of other medical problems in patients with chronic liver disease  There are no contraindications for the patient to take most medications that are necessary for treatment of other medical issues. The patient can take: Any medications utilized for treatment of DM. Statins to treat hypercholesterolemia    Normal doses of acetaminophen, as recommended on the label of the bottle, are not hepatotoxic except in the setting of daily alcohol use, even in patients with cirrhosis and can be utilized for pain. Counseling for alcohol in patients with chronic liver disease  The patient was counseled regarding alcohol consumption and the effect of alcohol on chronic liver disease. The patient has not consumed significant amounts of alcohol and has now discontinued all use. Vaccinations   The need for vaccination against viral hepatitis A and B will be assessed with serologic and instituted as appropriate. Since the patient does not have a chronic liver disease which can lead to liver injury screening for HAV and HBV is not needed.     The patient has received 2 doses and the booster dose of COVID-19 vaccine. Routine vaccinations against other bacterial and viral agents can be performed as indicated. Annual flu vaccination should be administered if indicated. ALLERGIES  Allergies   Allergen Reactions    Ceftin [Cefuroxime Axetil] Hives    Wellbutrin [Bupropion] Other (comments)     depression       MEDICATIONS  Current Outpatient Medications   Medication Sig    Dexcom G6 Sensor mary     Dexcom G6 Transmitter mary     Adderall XR 25 mg XR capsule TAKE 1 CAPSULE BY MOUTH WITH BREAKFAST    FLUoxetine (PROzac) 10 mg capsule Take 1 Capsule by mouth daily for 90 days.  FLUoxetine (PROzac) 20 mg capsule Take 1 Capsule by mouth daily for 90 days.  rosuvastatin (CRESTOR) 20 mg tablet Take 1 Tablet by mouth nightly for 90 days.  furosemide (LASIX) 20 mg tablet TAKE 1 TABLET BY MOUTH EVERY DAY    insulin glargine U-300 conc (Toujeo Max U-300 SoloStar) 300 unit/mL (3 mL) inpn INJECT 100 UNITS ONCE A DAY SUBCUTANEOUSLY 90 DAYS    insulin lispro (HumaLOG KwikPen Insulin) 200 unit/mL (3 mL) inpn 210 UNITS PER DAY AS DIRECTED VIA INSULIN PUMP SUBCUTANEOUSLY 30 DAYS    metoprolol succinate (TOPROL-XL) 100 mg tablet TAKE 1 TABLET BY MOUTH EVERY DAY    semaglutide (Ozempic) 1 mg/dose (2 mg/1.5 mL) sub-q pen as directed    pregabalin (LYRICA) 50 mg capsule TAKE 1 CAPSULE BY MOUTH THREE TIMES A DAY    aspirin delayed-release 81 mg tablet Take  by mouth daily.  levothyroxine (SYNTHROID) 200 mcg tablet Take 200 mcg by mouth Daily (before breakfast).  ACCU-CHEK COMPACT TEST strip     Insulin Needles, Disposable, (BD INSULIN PEN NEEDLE UF SHORT) 31 X 5/16 \" Ndle by Does Not Apply route two (2) times a day. Current Facility-Administered Medications   Medication    cyanocobalamin (VITAMIN B12) injection 1,000 mcg       SYSTEM REVIEW NOT RELATED TO LIVER DISEASE OR REVIEWED ABOVE:  Constitution systems: Negative for fever, chills, weight gain, weight loss. Eyes: Negative for visual changes. ENT: Negative for sore throat, painful swallowing. Respiratory: Negative for cough, hemoptysis, SOB. Cardiology: Negative for chest pain, palpitations. GI:  Negative for constipation or diarrhea. : Negative for urinary frequency, dysuria, hematuria, nocturia. Skin: Negative for rash. Hematology: Negative for easy bruising, blood clots. Musculo-skeletal: Negative for back pain, muscle pain, weakness. Neurologic: Negative for headaches, dizziness, vertigo, memory problems not related to HE. Psychology: Negative for anxiety, depression. FAMILY HISTORY:  The father has/had the following following chronic disease(s): Type 1 DM. The mother has/had the following chronic disease(s): COPD, Lung Cancer   of unknown cause. There is no family history of liver disease. There is no family history of immune disorders. SOCIAL HISTORY:  The patient is . The patient has 2 children, and 5 grandchildren. The patient stopped using tobacco products in . The patient has never consumed significant amounts of alcohol and had discontinued all use. The patient currently works full-time as an . PHYSICAL EXAMINATION:  Visit Vitals  /64 (BP 1 Location: Left upper arm, BP Patient Position: Sitting)   Pulse 88   Temp (!) 96.2 °F (35.7 °C) (Temporal)   Ht 5' 3\" (1.6 m)   Wt 217 lb 12.8 oz (98.8 kg)   BMI 38.58 kg/m²     General: No acute distress. Eyes: Sclera anicteric. ENT: No oral lesions. Thyroid normal.  Nodes: No adenopathy. Skin: No spider angiomata. No jaundice. No palmar erythema. Respiratory: Lungs clear to auscultation. Cardiovascular: Regular heart rate. No murmurs. No JVD. Abdomen: Soft non-tender. Liver size normal to percussion/palpation. Spleen not palpable. No obvious ascites. Extremities: No edema. No muscle wasting. No gross arthritic changes. Neurologic: Alert and oriented. Cranial nerves grossly intact. No asterixis. LABORATORY STUDIES:  Liver [place] of [address] [place] [address] Units 12/21/2021 10/11/2021   WBC 3.4 - 10.8 x10E3/uL 8.8 6.6   ANC 1.4 - 7.0 x10E3/uL 5.0    HGB 11.1 - 15.9 g/dL 15.9 14.7    - 450 x10E3/uL 265 207   INR 0.9 - 1.2 1.0    AST 0 - 40 IU/L 42 (H) 28   ALT 0 - 32 IU/L 43 (H) 33 (H)   Alk Phos 44 - 121 IU/L 109 94   Bili, Total 0.0 - 1.2 mg/dL 0.6 0.5   Bili, Direct 0.00 - 0.40 mg/dL 0.17    Albumin 3.8 - 4.9 g/dL 4.9 4.2   BUN 8 - 27 mg/dL 10 8   Creat 0.57 - 1.00 mg/dL 0.85 0.63   Creat (iSTAT) 0.6 - 1.3 mg/dL     Na 134 - 144 mmol/L 141 143   K 3.5 - 5.2 mmol/L 4.7 4.2   Cl 96 - 106 mmol/L 96 105   CO2 20 - 29 mmol/L 28 25   Glucose 65 - 99 mg/dL 285 (H) 187 (H)     SEROLOGIES:  Serologies Latest Ref Rng & Units 12/21/2021   Hep A Ab, Total Negative Negative   Hep B Surface Ag Negative Negative   Hep B Core Ab, Total Negative Negative   Hep B Surface AB QL  Non Reactive   Ferritin 15 - 150 ng/mL 148   Iron % Saturation 15 - 55 % 34     10/26/2021. Anti-HCV negative, HBsAg negative, ASMA negative, ceruloplasmin negative, AMA negative, Alpha-1 antitrypsin 139, Ferritin 149, IHSAN negative     LIVER HISTOLOGY:  12/06/2021. Liver biopsy. Steatohepatitis with bridging fibrosis. macrosteatosis involving 25% of parenchyma. Ballooning of hepatocytes . No inflammation. Will request slides for MLS to review. ENDOSCOPIC PROCEDURES:  Not available or performed    RADIOLOGY:  11/2021. Ultrasound of liver. Echogenic consistent with fatty liver. No liver mass lesions. No dilated bile ducts. No ascites. OTHER TESTING:  Not available or performed    FOLLOW-UP:  All of the issues listed above in the Assessment and Plan were discussed with the patient. All questions were answered. The patient expressed a clear understanding of the above.     [facility] [place] [address] in 3 months to assess for the effects of diet changes and weight loss.    ANTWAN Bacon-BC  [address] [address] [address] [facility] [address] [id number] [place], [address] [facility] 22.  [address] [facility]

## 2021-12-21 NOTE — TELEPHONE ENCOUNTER
[email or web address] [facility] Faxed to path Dept [phone]. (GARFIELD)    [email or web address] refuse to send liver bx slides. Patient will need to hire a carrier to  and bring back. Spoke w/Sowmya concerning this situation. Our office does not pay for any slides coming from another location. (KF)

## 2021-12-21 NOTE — PROGRESS NOTES
Room 2    Identified pt with two pt identifiers(name and ). Reviewed record in preparation for visit and have obtained necessary documentation. All patient medications has been reviewed. Chief Complaint   Patient presents with    New Patient     pt states that her liver is big;     Establish Care       3 most recent [address] 2021   PHQ Not Done Patient refuses   Little interest or pleasure in doing things -   Feeling down, depressed, irritable, or hopeless Nearly every day   Total Score PHQ 2 -   Trouble falling or staying asleep, or sleeping too much -   Feeling tired or having little energy -   Poor appetite, weight loss, or overeating -   Feeling bad about yourself - or that you are a failure or have let yourself or your family down -   Trouble concentrating on things such as school, work, reading, or watching TV -   Moving or speaking so slowly that other people could have noticed; or the opposite being so fidgety that others notice -   Thoughts of being better off dead, or hurting yourself in some way -   PHQ 9 Score -   How difficult have these problems made it for you to do your work, take care of your home and get along with others -     Abuse Screening Questionnaire 3/19/2014   Do you ever feel afraid of your partner? N   Are you in a relationship with someone who physically or mentally threatens you? N   Is it safe for you to go home? Y       Health Maintenance Due   Topic    DTaP/Tdap/Td series (1 - Tdap)    Cervical cancer screen     Shingrix Vaccine Age 50> (1 of 2)    Low dose CT lung screening     Foot Exam Q1     Breast Cancer Screen Mammogram     Colorectal Cancer Screening Combo          Health Maintenance Review: Patient reminded of \"due or due soon\" health maintenance. I have asked the patient to contact his/her primary care provider (PCP) for follow-up on his/her health maintenance.     Vitals:    21 1440   BP: 125/64   Pulse: 88   Temp: (!) 96.2 °F (35.7 °C) TempSrc: Temporal   Weight: 217 lb 12.8 oz (98.8 kg)   Height: 5' 3\" (1.6 m)   PainSc:   4   PainLoc: Generalized       Wt Readings from Last 3 Encounters:   12/21/21 217 lb 12.8 oz (98.8 kg)   11/26/21 221 lb 6.4 oz (100.4 kg)   10/27/21 219 lb 9.6 oz (99.6 kg)     Temp Readings from Last 3 Encounters:   12/21/21 (!) 96.2 °F (35.7 °C) (Temporal)   10/14/21 98.8 °F (37.1 °C)   09/24/21 97.9 °F (36.6 °C) (Temporal)     BP Readings from Last 3 Encounters:   12/21/21 125/64   11/26/21 105/68   10/27/21 128/67     Pulse Readings from Last 3 Encounters:   12/21/21 88   11/26/21 88   10/27/21 85       Coordination of Care Questionnaire:   1) Have you been to an emergency room, urgent care, or hospitalized since your last visit? No    2. Have seen or consulted any other health care provider since your last visit? YES  Dr Nigel Cabrales gastrointestinal specialty     Patient is accompanied by self I have received verbal consent from [facility] [place] to discuss any/all medical information while they are present in the room.

## 2021-12-22 LAB
ALBUMIN SERPL-MCNC: 4.9 G/DL (ref 3.8–4.9)
ALP SERPL-CCNC: 109 IU/L (ref 44–121)
ALT SERPL-CCNC: 43 IU/L (ref 0–32)
AST SERPL-CCNC: 42 IU/L (ref 0–40)
BASOPHILS # BLD AUTO: 0 X10E3/UL (ref 0–0.2)
BASOPHILS NFR BLD AUTO: 1 %
BILIRUB DIRECT SERPL-MCNC: 0.17 MG/DL (ref 0–0.4)
BILIRUB SERPL-MCNC: 0.6 MG/DL (ref 0–1.2)
BUN SERPL-MCNC: 10 MG/DL (ref 8–27)
BUN/CREAT SERPL: 12 (ref 12–28)
CALCIUM SERPL-MCNC: 10.3 MG/DL (ref 8.7–10.3)
CHLORIDE SERPL-SCNC: 96 MMOL/L (ref 96–106)
CO2 SERPL-SCNC: 28 MMOL/L (ref 20–29)
CREAT SERPL-MCNC: 0.85 MG/DL (ref 0.57–1)
EOSINOPHIL # BLD AUTO: 0.2 X10E3/UL (ref 0–0.4)
EOSINOPHIL NFR BLD AUTO: 2 %
ERYTHROCYTE [DISTWIDTH] IN BLOOD BY AUTOMATED COUNT: 12.5 % (ref 11.7–15.4)
FERRITIN SERPL-MCNC: 148 NG/ML (ref 15–150)
GLUCOSE SERPL-MCNC: 285 MG/DL (ref 65–99)
HAV AB SER QL IA: NEGATIVE
HBV CORE AB SERPL QL IA: NEGATIVE
HBV SURFACE AB SER QL: NON REACTIVE
HBV SURFACE AG SERPL QL IA: NEGATIVE
HCT VFR BLD AUTO: 48.6 % (ref 34–46.6)
HGB BLD-MCNC: 15.9 G/DL (ref 11.1–15.9)
IMM GRANULOCYTES # BLD AUTO: 0 X10E3/UL (ref 0–0.1)
IMM GRANULOCYTES NFR BLD AUTO: 0 %
INR PPP: 1 (ref 0.9–1.2)
IRON SATN MFR SERPL: 34 % (ref 15–55)
IRON SERPL-MCNC: 122 UG/DL (ref 27–159)
LYMPHOCYTES # BLD AUTO: 2.9 X10E3/UL (ref 0.7–3.1)
LYMPHOCYTES NFR BLD AUTO: 33 %
MCH RBC QN AUTO: 29.9 PG (ref 26.6–33)
MCHC RBC AUTO-ENTMCNC: 32.7 G/DL (ref 31.5–35.7)
MCV RBC AUTO: 91 FL (ref 79–97)
MONOCYTES # BLD AUTO: 0.8 X10E3/UL (ref 0.1–0.9)
MONOCYTES NFR BLD AUTO: 9 %
NEUTROPHILS # BLD AUTO: 5 X10E3/UL (ref 1.4–7)
NEUTROPHILS NFR BLD AUTO: 55 %
PLATELET # BLD AUTO: 265 X10E3/UL (ref 150–450)
POTASSIUM SERPL-SCNC: 4.7 MMOL/L (ref 3.5–5.2)
PROT SERPL-MCNC: 7.9 G/DL (ref 6–8.5)
PROTHROMBIN TIME: 10.7 SEC (ref 9.1–12)
RBC # BLD AUTO: 5.32 X10E6/UL (ref 3.77–5.28)
SODIUM SERPL-SCNC: 141 MMOL/L (ref 134–144)
TIBC SERPL-MCNC: 356 UG/DL (ref 250–450)
UIBC SERPL-MCNC: 234 UG/DL (ref 131–425)
WBC # BLD AUTO: 8.8 X10E3/UL (ref 3.4–10.8)

## 2021-12-22 NOTE — PROGRESS NOTES
My chart message sent regarding the blood work results. The liver enzymes were slightly elevated. Glucose level was elevated. Biopsy slides will be requested for review.

## 2021-12-23 ENCOUNTER — TELEPHONE (OUTPATIENT)
Dept: HEMATOLOGY | Age: 61
End: 2021-12-23

## 2021-12-23 NOTE — TELEPHONE ENCOUNTER
[email or web address] Patient made aware that our office can't obtain liver bx slides from [address]. Patient going to call over to [address] to possibly obtain slides. (KF)

## 2021-12-26 PROBLEM — K76.0 NAFLD (NONALCOHOLIC FATTY LIVER DISEASE): Status: ACTIVE | Noted: 2021-12-26

## 2021-12-26 PROBLEM — H93.13 TINNITUS OF BOTH EARS: Status: ACTIVE | Noted: 2021-12-26

## 2021-12-28 ENCOUNTER — PATIENT MESSAGE (OUTPATIENT)
Dept: FAMILY MEDICINE CLINIC | Age: 61
End: 2021-12-28

## 2021-12-28 DIAGNOSIS — R41.89 BRAIN FOG: Primary | ICD-10-CM

## 2021-12-28 DIAGNOSIS — R41.840 DIFFICULTY CONCENTRATING: ICD-10-CM

## 2022-01-05 ENCOUNTER — TELEPHONE (OUTPATIENT)
Dept: HEMATOLOGY | Age: 62
End: 2022-01-05

## 2022-01-05 NOTE — TELEPHONE ENCOUNTER
----- Message from Burton Keene sent at 12/28/2021  2:02 PM EST -----  Regarding: Slides en route! Marques Butterfield    The patient was able to have [address] mail the slides to us. Just letting you know so you can keep an eye out for those! [email or web address] Attempt to call patient to check on liver bx slides. Left voice message. (GARFIELD)----[id number] Rec'd liver bx slides will give to Mary on 1/6/22. (GARFIELD)    [email or web address] Liver bx slides given to  in hand this morning. (GARFIELD)

## 2022-01-06 ENCOUNTER — TELEPHONE (OUTPATIENT)
Dept: HEMATOLOGY | Age: 62
End: 2022-01-06

## 2022-01-06 NOTE — TELEPHONE ENCOUNTER
Patient was calling to talk to Greer about the slides from [facility]. Patient wanted to verify with Greer before she called again that the slides had not been received yet.  Best call back number: [phone]

## 2022-01-06 NOTE — TELEPHONE ENCOUNTER
----- Message from Rachna Cho sent at 12/28/2021  2:02 PM EST -----  Regarding: Slides en route! Hi Greer    The patient was able to have [address] mail the slides to us. Just letting you know so you can keep an eye out for those!     Read Marcio

## 2022-01-10 RX ORDER — DEXTROAMPHETAMINE SACCHARATE, AMPHETAMINE ASPARTATE MONOHYDRATE, DEXTROAMPHETAMINE SULFATE AND AMPHETAMINE SULFATE 6.25; 6.25; 6.25; 6.25 MG/1; MG/1; MG/1; MG/1
25 CAPSULE, EXTENDED RELEASE ORAL DAILY
Qty: 30 CAPSULE | Refills: 0 | Status: SHIPPED | OUTPATIENT
Start: 2022-02-09 | End: 2022-04-12 | Stop reason: DRUGHIGH

## 2022-01-10 RX ORDER — DEXTROAMPHETAMINE SACCHARATE, AMPHETAMINE ASPARTATE MONOHYDRATE, DEXTROAMPHETAMINE SULFATE AND AMPHETAMINE SULFATE 6.25; 6.25; 6.25; 6.25 MG/1; MG/1; MG/1; MG/1
25 CAPSULE, EXTENDED RELEASE ORAL DAILY
Qty: 30 CAPSULE | Refills: 0 | Status: SHIPPED | OUTPATIENT
Start: 2022-01-10 | End: 2022-04-12 | Stop reason: DRUGHIGH

## 2022-01-10 RX ORDER — DEXTROAMPHETAMINE SACCHARATE, AMPHETAMINE ASPARTATE MONOHYDRATE, DEXTROAMPHETAMINE SULFATE AND AMPHETAMINE SULFATE 6.25; 6.25; 6.25; 6.25 MG/1; MG/1; MG/1; MG/1
25 CAPSULE, EXTENDED RELEASE ORAL DAILY
Qty: 30 CAPSULE | Refills: 0 | Status: SHIPPED | OUTPATIENT
Start: 2022-03-11 | End: 2022-04-12 | Stop reason: DRUGHIGH

## 2022-01-10 NOTE — TELEPHONE ENCOUNTER
From: Katie Loja  To: Mel Mina DO  Sent: 12/28/2021 11:12 AM EST  Subject: Addreall XR    I picked up the Adderall and the price went from $9 to $50. The pharamcy and Insurance Co said the reason was marking Substitution Permitted or No Substitution Permitted. The pharamacy gave me a copy of both that I will attach. I paid the $50 this time but would like you to change the perscription going forward. This is just crazy. Thanks for your help. Also went to [facility] and they were suppose to send you all the records. Please let me know if you don't receive them.     Happy New Year (I hope)  Tawanna Trevino  1960  [phone]

## 2022-01-11 ENCOUNTER — TELEPHONE (OUTPATIENT)
Dept: HEMATOLOGY | Age: 62
End: 2022-01-11

## 2022-01-12 NOTE — TELEPHONE ENCOUNTER
Mailed appt reminder along with instructions for Fibroscan per April CARLOS Pate            ----- Message from April Vicky Little NP sent at 1/11/2022  2:45 PM EST -----  Can someone also send instructions for Fibroscan   ----- Message -----  From: April Rupesh: 1/10/2022   5:08 PM EST  To: April Vicky Little NP    done  ----- Message -----  Chris QUINTANILLA NP  Sent: 1/7/2022   5:57 PM EST  To: Donnell Phillips 127    Please convert next visit to a Fibroscan

## 2022-03-18 PROBLEM — I25.810 CORONARY ARTERY DISEASE INVOLVING CORONARY BYPASS GRAFT OF NATIVE HEART WITHOUT ANGINA PECTORIS: Status: ACTIVE | Noted: 2021-12-21

## 2022-03-18 PROBLEM — R41.89 BRAIN FOG: Status: ACTIVE | Noted: 2021-12-10

## 2022-03-18 PROBLEM — R22.2 CHEST WALL MASS: Status: ACTIVE | Noted: 2020-05-29

## 2022-03-19 PROBLEM — F40.9 INSOMNIA DUE TO ANXIETY AND FEAR: Status: ACTIVE | Noted: 2021-10-27

## 2022-03-19 PROBLEM — F06.31 DEPRESSION DUE TO PHYSICAL ILLNESS: Status: ACTIVE | Noted: 2021-10-27

## 2022-03-19 PROBLEM — E53.8 B12 DEFICIENCY: Status: ACTIVE | Noted: 2021-10-27

## 2022-03-19 PROBLEM — K76.0 NAFLD (NONALCOHOLIC FATTY LIVER DISEASE): Status: ACTIVE | Noted: 2021-12-26

## 2022-03-19 PROBLEM — F51.05 INSOMNIA DUE TO ANXIETY AND FEAR: Status: ACTIVE | Noted: 2021-10-27

## 2022-03-19 PROBLEM — R41.840 DIFFICULTY CONCENTRATING: Status: ACTIVE | Noted: 2021-12-10

## 2022-03-19 PROBLEM — H93.13 TINNITUS OF BOTH EARS: Status: ACTIVE | Noted: 2021-12-26

## 2022-04-01 ENCOUNTER — OFFICE VISIT (OUTPATIENT)
Dept: HEMATOLOGY | Age: 62
End: 2022-04-01
Payer: COMMERCIAL

## 2022-04-01 VITALS
TEMPERATURE: 97.1 F | HEART RATE: 88 BPM | WEIGHT: 220.4 LBS | OXYGEN SATURATION: 97 % | DIASTOLIC BLOOD PRESSURE: 68 MMHG | HEIGHT: 63 IN | SYSTOLIC BLOOD PRESSURE: 118 MMHG | BODY MASS INDEX: 39.05 KG/M2

## 2022-04-01 DIAGNOSIS — K75.81 NASH (NONALCOHOLIC STEATOHEPATITIS): Primary | ICD-10-CM

## 2022-04-01 DIAGNOSIS — K76.0 NAFLD (NONALCOHOLIC FATTY LIVER DISEASE): ICD-10-CM

## 2022-04-01 DIAGNOSIS — M54.50 ACUTE RIGHT-SIDED LOW BACK PAIN WITHOUT SCIATICA: ICD-10-CM

## 2022-04-01 LAB
BASOPHILS # BLD AUTO: 0 X10E3/UL (ref 0–0.2)
BASOPHILS NFR BLD AUTO: 0 %
EOSINOPHIL # BLD AUTO: 0.2 X10E3/UL (ref 0–0.4)
EOSINOPHIL NFR BLD AUTO: 2 %
ERYTHROCYTE [DISTWIDTH] IN BLOOD BY AUTOMATED COUNT: 12.6 % (ref 11.7–15.4)
HCT VFR BLD AUTO: 43.1 % (ref 34–46.6)
HGB BLD-MCNC: 14.4 G/DL (ref 11.1–15.9)
IMM GRANULOCYTES # BLD AUTO: 0 X10E3/UL (ref 0–0.1)
IMM GRANULOCYTES NFR BLD AUTO: 0 %
LYMPHOCYTES # BLD AUTO: 2.4 X10E3/UL (ref 0.7–3.1)
LYMPHOCYTES NFR BLD AUTO: 32 %
MCH RBC QN AUTO: 30.3 PG (ref 26.6–33)
MCHC RBC AUTO-ENTMCNC: 33.4 G/DL (ref 31.5–35.7)
MCV RBC AUTO: 91 FL (ref 79–97)
MONOCYTES # BLD AUTO: 0.8 X10E3/UL (ref 0.1–0.9)
MONOCYTES NFR BLD AUTO: 10 %
NEUTROPHILS # BLD AUTO: 4.2 X10E3/UL (ref 1.4–7)
NEUTROPHILS NFR BLD AUTO: 56 %
PLATELET # BLD AUTO: 243 X10E3/UL (ref 150–450)
RBC # BLD AUTO: 4.76 X10E6/UL (ref 3.77–5.28)
WBC # BLD AUTO: 7.6 X10E3/UL (ref 3.4–10.8)

## 2022-04-01 PROCEDURE — 91200 LIVER ELASTOGRAPHY: CPT | Performed by: NURSE PRACTITIONER

## 2022-04-01 PROCEDURE — 99214 OFFICE O/P EST MOD 30 MIN: CPT | Performed by: NURSE PRACTITIONER

## 2022-04-01 RX ORDER — MELOXICAM 15 MG/1
15 TABLET ORAL DAILY
Qty: 14 TABLET | Refills: 0 | Status: SHIPPED | OUTPATIENT
Start: 2022-04-01 | End: 2022-08-01

## 2022-04-01 NOTE — PROGRESS NOTES
[address] \[facility]\"", MD, [address], [facility], [place]      JOSEPHINE Sol, ACNP-BC     Kristi Pate, [facility]   ALONDRA Del Real, [facility]       Ravi Deputado Epifanio De Falk 136    at [address], [address] [place] [address], [place]  22.    [phone]    FAX: [address] [place] [address]    at [address], [address], [address]    [phone]    FAX: [phone]       Patient Care Team:  Nori Hodgkin, DO as PCP - General (Family Medicine)  Michael García MD as Physician (Endocrinology)      Problem List  Date Reviewed: 12/26/2021          Codes Class Noted    LO (nonalcoholic steatohepatitis) ICD-10-CM: K75.81  ICD-9-CM: 571.8  12/26/2021        Tinnitus of both ears ICD-10-CM: H93.13  ICD-9-CM: 388.30  12/26/2021        Coronary artery disease involving coronary bypass graft of native heart without angina pectoris ICD-10-CM: I25.810  ICD-9-CM: 414.05  12/21/2021    Overview Signed 12/21/2021  3:12 PM by Kristi Mason NP     1/2020              Brain fog ICD-10-CM: R41.89  ICD-9-CM: 799.59  12/10/2021    Overview Addendum 12/10/2021  3:15 PM by Nori Hodgkin, DO     Onset since starting chemo for breast cancer   See notes in Media from 2/2021 from [facility]              Difficulty concentrating ICD-10-CM: R41.840  ICD-9-CM: 799.51  12/10/2021    Overview Signed 12/10/2021  3:16 PM by Nori Hodgkin, DO     Onset while on chemo and persisted since   Takes Adderall 25mg XL daily  See neuro oncology notes in Media 2/2021              B12 deficiency ICD-10-CM: E53.8  ICD-9-CM: 266.2  10/27/2021        Depression due to physical illness ICD-10-CM: F06.31  ICD-9-CM: 293.83  10/27/2021        Insomnia due to anxiety and fear ICD-10-CM: F51.05, F40.9  ICD-9-CM: 300.20, 327.02  10/27/2021        Chest wall mass ICD-10-CM: R22.2  ICD-9-CM: 786.6  5/29/2020        Severe obesity (BMI 35.0-39. 9) ICD-10-CM: E66.01  ICD-9-CM: 278.01  6/13/2018        Lymphedema of upper extremity following lymphadenectomy ICD-10-CM: E89.89, I89.0  ICD-9-CM: 997.99, 457.1  7/6/2015        Osteopenia (Chronic) ICD-10-CM: M85.80  ICD-9-CM: 733.90  3/15/2014        Breast cancer of upper-outer quadrant of right female breast [facility]) ICD-10-CM: C50.411  ICD-9-CM: 174.4  3/5/2014        Type 2 diabetes mellitus with neurologic complication, with long-term current use of insulin (HCC) ICD-10-CM: E11.49, Z79.4  ICD-9-CM: 250.60, V58.67  Unknown        Other and unspecified hyperlipidemia ICD-10-CM: E78.5  ICD-9-CM: 272.4  9/30/2010        Unspecified hypothyroidism ICD-10-CM: E03.9  ICD-9-CM: 244.9  9/30/2010            Jacquelin De Guzman is being seen at [facility] for management of non-alcoholic steatohepatitis (LO). The active problem list, all pertinent past medical history, medications,   liver histology, radiologic findings and laboratory findings related to the liver disorder were reviewed and discussed with the patient. The patient is a 62 y.o.  female who was found to have an enlarged liver on palpation and imaging 9/2021. Serologic evaluation for markers of chronic liver disease were negative. Ultrasound was performed in 11/01/2021. The results of the imaging suggested fatty liver disease. A liver biopsy was performed at [facility] in 12/06/2021. The results demonstrate Bridging Fibrosis and steatohepatitis involving approximately 25% of parenchyma. Ballooning is present. Dr. Alena Up reviewed the slides which demonstrated LO with bridging fibrosis. RISA 5 (122). Of note, the patient has a history of CAD with cardiac bypass surgery 1/2020. She notes a 20 lb weight gain since surgery.  She also received chemotherapy and radiation for breast cancer in 2014. Tamoxifen was discontinued in 2019. The patient has the following symptoms which could be attributed to the liver disorder:    Pain in the right side over the liver, and swelling of the lower extremities. The patient is not experiencing the following symptoms which are commonly seen in this liver disorder:  Nausea, vomiting, yellowing of the eyes or skin, itching, or problems concentrating. The patient completes all daily activities without any functional limitations. She notes chronic Tinnitus. ASSESSMENT AND PLAN:  NAFLD  The diagnosis is based upon liver biopsy, imaging, features of metabolic syndrome, and   serologic studies that are negative for other causes of chronic liver disease. A liver biopsy performed in 12/2021 demonstrates Stage 3 bridging fibrosis. Will request slides for Dr. Erich Bruner to review. Have performed laboratory testing to monitor liver function and degree of liver injury. This included BMP, hepatic panel, CBC with platelet count and INR. Laboratory testing from 12/2021 reviewed in detail. Follow-up testing ordered today. The liver transaminases are elevated. The ALP is normal. The liver function is normal. The platelet count is normal.     Based upon laboratory studies, liver biopsy and imaging the patient appears to have advanced liver disease but not cirrhosis. If the patient looses 20% of current body weight, which is 42 pounds, down to a weight of of 175 pounds, steatosis should resolve. Once all steatosis has resolved inflammation will resolve. Fibrosis will gradually resolve and the liver could eventually be normal.     Discussed weight loss strategies and packet of information provided to the patient. Advise she also add a walking program.     There is currently no FDA approved medical treatment for fatty liver, NALFD or LO. The only medical treatments for LO are though clinical trials.   The patient has declined to participate in a clinical trial and would like to try to lose weight through changes in diet and life style. The Fibroscan can be repeated annually or as often as clinically indicated to assess for fibrosis progression and/or regression. Low Back Pain  Will prescribe Meloxicam 7.5 mg to take as needed. She should also apply a topical lidocaine patch to area. Counseling for diet and weight loss in patients with confirmed or suspected NAFLD  The patient was counseled regarding diet and exercise to achieve weight loss. The best diet for patients with fatty liver is one very low in carbohydrates and enriched with protein such as a low carbohydrate Mediterranean diet. The patient was told not to consume any food products and drinks containing fructose as this enhances hepatic fat synthesis. There is no medication or vitamin supplements that we advocate for LO. Using glitazones in patients without diabetes mellitus has been shown to reduce fat content in the liver but has no effect on fibrosis and is associated with weight gain. Vitamin E has also been used but the data is not very good and most experts no longer advocate this. Screening for Hepatocellular Carcinoma  HCC screening is not necessary if the patient has no evidence of cirrhosis. Treatment of other medical problems in patients with chronic liver disease  There are no contraindications for the patient to take most medications that are necessary for treatment of other medical issues. The patient can take: Any medications utilized for treatment of DM. Statins to treat hypercholesterolemia    Normal doses of acetaminophen, as recommended on the label of the bottle, are not hepatotoxic except in the setting of daily alcohol use, even in patients with cirrhosis and can be utilized for pain.     Counseling for alcohol in patients with chronic liver disease  The patient was counseled regarding alcohol consumption and the effect of alcohol on chronic liver disease. The patient has not consumed significant amounts of alcohol and has now discontinued all use. Vaccinations   Vaccination for viral hepatitis A and B is recommended since the patient has no serologic evidence of previous exposure or vaccination with immunity. Routine vaccinations against other bacterial and viral agents can be performed as indicated. Annual flu vaccination should be administered if indicated. ALLERGIES  Allergies   Allergen Reactions    Ceftin [Cefuroxime Axetil] Hives    Wellbutrin [Bupropion] Other (comments)     depression       MEDICATIONS  Current Outpatient Medications   Medication Sig    meloxicam (MOBIC) 15 mg tablet Take 1 Tablet by mouth daily.  amphetamine-dextroamphetamine XR (ADDERALL XR) 25 mg XR capsule Take 1 Capsule by mouth daily for 29 days. Max Daily Amount: 25 mg.    amphetamine-dextroamphetamine XR (ADDERALL XR) 25 mg XR capsule Take 1 Capsule by mouth daily for 29 days. Max Daily Amount: 25 mg.    amphetamine-dextroamphetamine XR (ADDERALL XR) 25 mg XR capsule Take 1 Capsule by mouth daily for 29 days. Max Daily Amount: 25 mg.    Dexcom G6 Sensor mary     Dexcom G6 Transmitter mary     furosemide (LASIX) 20 mg tablet TAKE 1 TABLET BY MOUTH EVERY DAY    insulin glargine U-300 conc (Toujeo Max U-300 SoloStar) 300 unit/mL (3 mL) inpn INJECT 100 UNITS ONCE A DAY SUBCUTANEOUSLY 90 DAYS    insulin lispro (HumaLOG KwikPen Insulin) 200 unit/mL (3 mL) inpn 210 UNITS PER DAY AS DIRECTED VIA INSULIN PUMP SUBCUTANEOUSLY 30 DAYS    metoprolol succinate (TOPROL-XL) 100 mg tablet TAKE 1 TABLET BY MOUTH EVERY DAY    semaglutide (Ozempic) 1 mg/dose (2 mg/1.5 mL) sub-q pen as directed    pregabalin (LYRICA) 50 mg capsule TAKE 1 CAPSULE BY MOUTH THREE TIMES A DAY    aspirin delayed-release 81 mg tablet Take  by mouth daily.  levothyroxine (SYNTHROID) 200 mcg tablet Take 200 mcg by mouth Daily (before breakfast).     Insulin Needles, Disposable, (BD INSULIN PEN NEEDLE UF SHORT) 31 X 5/16 \" Ndle by Does Not Apply route two (2) times a day.  ACCU-CHEK COMPACT TEST strip      Current Facility-Administered Medications   Medication    cyanocobalamin (VITAMIN B12) injection 1,000 mcg       SYSTEM REVIEW NOT RELATED TO LIVER DISEASE OR REVIEWED ABOVE:  Constitution systems: Negative for fever, chills, weight gain, weight loss. Eyes: Negative for visual changes. ENT: Negative for sore throat, painful swallowing. Respiratory: Negative for cough, hemoptysis, SOB. Cardiology: Negative for chest pain, palpitations. GI:  Negative for constipation or diarrhea. : Negative for urinary frequency, dysuria, hematuria, nocturia. Skin: Negative for rash. Hematology: Negative for easy bruising, blood clots. Musculo-skeletal: Negative for back pain, muscle pain, weakness. Neurologic: Negative for headaches, dizziness, vertigo, memory problems not related to HE. Psychology: Negative for anxiety, depression. FAMILY HISTORY:  The father has/had the following following chronic disease(s): Type 1 DM. The mother has/had the following chronic disease(s): COPD, Lung Cancer   of unknown cause. There is no family history of liver disease. There is no family history of immune disorders. SOCIAL HISTORY:  The patient is . The patient has 2 children, and 5 grandchildren. The patient stopped using tobacco products in . The patient has never consumed significant amounts of alcohol and had discontinued all use. The patient currently works full-time as an . PHYSICAL EXAMINATION:  Visit Vitals  /68 (BP 1 Location: Left upper arm, BP Patient Position: Sitting, BP Cuff Size: Adult)   Pulse 88   Temp 97.1 °F (36.2 °C) (Temporal)   Ht 5' 3\" (1.6 m)   Wt 220 lb 6.4 oz (100 kg)   SpO2 97%   BMI 39.04 kg/m²       General: No acute distress. Eyes: Sclera anicteric.    ENT: No oral lesions. Thyroid normal.  Nodes: No adenopathy. Skin: No spider angiomata. No jaundice. No palmar erythema. Abdomen: Soft non-tender, liver size normal to percussion/palpation. Spleen not palpable. No obvious ascites. Extremities: No edema. No muscle wasting. No gross arthritic changes. Neurologic: Alert and oriented. Cranial nerves grossly intact. No asterixis. LABORATORY STUDIES:  Liver [place] of [address] Units 12/21/2021 10/11/2021   WBC 3.4 - 10.8 x10E3/uL 8.8 6.6   ANC 1.4 - 7.0 x10E3/uL 5.0    HGB 11.1 - 15.9 g/dL 15.9 14.7    - 450 x10E3/uL 265 207   INR 0.9 - 1.2 1.0    AST 0 - 40 IU/L 42 (H) 28   ALT 0 - 32 IU/L 43 (H) 33 (H)   Alk Phos 44 - 121 IU/L 109 94   Bili, Total 0.0 - 1.2 mg/dL 0.6 0.5   Bili, Direct 0.00 - 0.40 mg/dL 0.17    Albumin 3.8 - 4.9 g/dL 4.9 4.2   BUN 8 - 27 mg/dL 10 8   Creat 0.57 - 1.00 mg/dL 0.85 0.63   Creat (iSTAT) 0.6 - 1.3 mg/dL     Na 134 - 144 mmol/L 141 143   K 3.5 - 5.2 mmol/L 4.7 4.2   Cl 96 - 106 mmol/L 96 105   CO2 20 - 29 mmol/L 28 25   Glucose 65 - 99 mg/dL 285 (H) 187 (H)     Laboratory testing from 12/21/2021 reviewed in detail. Additional testing included to evaluate progression or regression of disease. Laboratory testing results from today will be communicated by My Chart. SEROLOGIES:  Serologies Latest Ref Rng & Units 12/21/2021   Hep A Ab, Total Negative Negative   Hep B Surface Ag Negative Negative   Hep B Core Ab, Total Negative Negative   Hep B Surface AB QL  Non Reactive   Ferritin 15 - 150 ng/mL 148   Iron % Saturation 15 - 55 % 34     10/26/2021. Anti-HCV negative, HBsAg negative, ASMA negative, ceruloplasmin negative, AMA negative, Alpha-1 antitrypsin 139, Ferritin 149, IHSAN negative     LIVER HISTOLOGY:  12/06/2021. Liver biopsy reviewed by MLS. Steatohepatitis with bridging fibrosis. Macrosteatosis/microsteatosis involving 10-25% of parenchyma. Ballooning of hepatocytes . RISA 5 (122). 4/2022.   FibroScan performed at [address]. EkPa was 12.4. IQR/med 11%. . The results suggested a fibrosis level of F3. The CAP score suggests there is hepatic steatosis. ENDOSCOPIC PROCEDURES:  Not available or performed    RADIOLOGY:  11/2021. Ultrasound of liver. Echogenic consistent with fatty liver. No liver mass lesions. No dilated bile ducts. No ascites. OTHER TESTING:  Not available or performed    FOLLOW-UP:  All of the issues listed above in the Assessment and Plan were discussed with the patient. All questions were answered. The patient expressed a clear understanding of the above. [address] [address] in 3 months to assess for the effects of diet changes and weight loss. ANTWAN Bacon-BC  [facility] of [address] [id number] [address], [address] [place] [address] 22.  [facility]

## 2022-04-01 NOTE — PROGRESS NOTES
Identified pt with two pt identifiers(name and ). Reviewed record in preparation for visit and have obtained necessary documentation. Chief Complaint   Patient presents with    Fatty Liver     FS with April      Vitals:    22 0824   BP: 118/68   Pulse: 88   Temp: 97.1 °F (36.2 °C)   TempSrc: Temporal   SpO2: 97%   Weight: 220 lb 6.4 oz (100 kg)   Height: 5' 3\" (1.6 m)   PainSc:   7   PainLoc: Back       Health Maintenance Review: Patient reminded of \"due or due soon\" health maintenance. I have asked the patient to contact his/her primary care provider (PCP) for follow-up on his/her health maintenance. Coordination of Care Questionnaire:  :   1) Have you been to an emergency room, urgent care, or hospitalized since your last visit? If yes, where when, and reason for visit? NO    2. Have seen or consulted any other health care provider since your last visit? If yes, where when, and reason for visit? NO      Patient is accompanied by self I have received verbal consent from Stacy Echeverria to discuss any/all medical information while they are present in the room.

## 2022-04-02 LAB
ALBUMIN SERPL-MCNC: 4.6 G/DL (ref 3.8–4.9)
ALP SERPL-CCNC: 93 IU/L (ref 44–121)
ALT SERPL-CCNC: 39 IU/L (ref 0–32)
AST SERPL-CCNC: 36 IU/L (ref 0–40)
BILIRUB DIRECT SERPL-MCNC: 0.13 MG/DL (ref 0–0.4)
BILIRUB SERPL-MCNC: 0.4 MG/DL (ref 0–1.2)
BUN SERPL-MCNC: 9 MG/DL (ref 8–27)
BUN/CREAT SERPL: 13 (ref 12–28)
CALCIUM SERPL-MCNC: 9.8 MG/DL (ref 8.7–10.3)
CHLORIDE SERPL-SCNC: 103 MMOL/L (ref 96–106)
CO2 SERPL-SCNC: 22 MMOL/L (ref 20–29)
CREAT SERPL-MCNC: 0.71 MG/DL (ref 0.57–1)
EGFR: 97 ML/MIN/1.73
GLUCOSE SERPL-MCNC: 135 MG/DL (ref 65–99)
POTASSIUM SERPL-SCNC: 4.3 MMOL/L (ref 3.5–5.2)
PROT SERPL-MCNC: 6.8 G/DL (ref 6–8.5)
SODIUM SERPL-SCNC: 144 MMOL/L (ref 134–144)

## 2022-04-12 ENCOUNTER — OFFICE VISIT (OUTPATIENT)
Dept: FAMILY MEDICINE CLINIC | Age: 62
End: 2022-04-12
Payer: COMMERCIAL

## 2022-04-12 VITALS
RESPIRATION RATE: 16 BRPM | HEART RATE: 88 BPM | DIASTOLIC BLOOD PRESSURE: 63 MMHG | OXYGEN SATURATION: 94 % | SYSTOLIC BLOOD PRESSURE: 103 MMHG | BODY MASS INDEX: 39.15 KG/M2 | WEIGHT: 221 LBS

## 2022-04-12 DIAGNOSIS — E03.9 ACQUIRED HYPOTHYROIDISM: ICD-10-CM

## 2022-04-12 DIAGNOSIS — K75.81 NASH (NONALCOHOLIC STEATOHEPATITIS): ICD-10-CM

## 2022-04-12 DIAGNOSIS — R41.89 BRAIN FOG: Primary | ICD-10-CM

## 2022-04-12 DIAGNOSIS — F06.31 DEPRESSION DUE TO PHYSICAL ILLNESS: ICD-10-CM

## 2022-04-12 DIAGNOSIS — E11.40 TYPE 2 DIABETES MELLITUS WITH DIABETIC NEUROPATHY, WITH LONG-TERM CURRENT USE OF INSULIN (HCC): ICD-10-CM

## 2022-04-12 DIAGNOSIS — Z79.4 TYPE 2 DIABETES MELLITUS WITH DIABETIC NEUROPATHY, WITH LONG-TERM CURRENT USE OF INSULIN (HCC): ICD-10-CM

## 2022-04-12 DIAGNOSIS — E53.8 B12 DEFICIENCY: ICD-10-CM

## 2022-04-12 DIAGNOSIS — R41.840 POOR CONCENTRATION: ICD-10-CM

## 2022-04-12 PROCEDURE — 99214 OFFICE O/P EST MOD 30 MIN: CPT | Performed by: STUDENT IN AN ORGANIZED HEALTH CARE EDUCATION/TRAINING PROGRAM

## 2022-04-12 RX ORDER — DEXTROAMPHETAMINE SACCHARATE, AMPHETAMINE ASPARTATE MONOHYDRATE, DEXTROAMPHETAMINE SULFATE AND AMPHETAMINE SULFATE 7.5; 7.5; 7.5; 7.5 MG/1; MG/1; MG/1; MG/1
30 CAPSULE, EXTENDED RELEASE ORAL DAILY
Qty: 30 CAPSULE | Refills: 0 | Status: SHIPPED | OUTPATIENT
Start: 2022-04-12 | End: 2022-04-12 | Stop reason: SDUPTHER

## 2022-04-12 RX ORDER — DEXTROAMPHETAMINE SACCHARATE, AMPHETAMINE ASPARTATE MONOHYDRATE, DEXTROAMPHETAMINE SULFATE AND AMPHETAMINE SULFATE 7.5; 7.5; 7.5; 7.5 MG/1; MG/1; MG/1; MG/1
30 CAPSULE, EXTENDED RELEASE ORAL DAILY
Qty: 30 CAPSULE | Refills: 0 | Status: SHIPPED | OUTPATIENT
Start: 2022-06-11 | End: 2022-04-12 | Stop reason: SDUPTHER

## 2022-04-12 RX ORDER — DEXTROAMPHETAMINE SACCHARATE, AMPHETAMINE ASPARTATE MONOHYDRATE, DEXTROAMPHETAMINE SULFATE AND AMPHETAMINE SULFATE 7.5; 7.5; 7.5; 7.5 MG/1; MG/1; MG/1; MG/1
30 CAPSULE, EXTENDED RELEASE ORAL DAILY
Qty: 30 CAPSULE | Refills: 0 | Status: SHIPPED | OUTPATIENT
Start: 2022-05-12 | End: 2022-04-12 | Stop reason: SDUPTHER

## 2022-04-12 RX ORDER — FLUOXETINE HYDROCHLORIDE 20 MG/1
20 CAPSULE ORAL DAILY
COMMUNITY
Start: 2022-02-28 | End: 2022-05-17

## 2022-04-12 RX ORDER — INSULIN HUMAN 500 [IU]/ML
INJECTION, SOLUTION SUBCUTANEOUS
COMMUNITY
Start: 2022-03-25

## 2022-04-12 RX ORDER — ROSUVASTATIN CALCIUM 20 MG/1
20 TABLET, COATED ORAL
COMMUNITY
Start: 2022-03-28 | End: 2022-09-22

## 2022-04-12 RX ORDER — DEXTROAMPHETAMINE SACCHARATE, AMPHETAMINE ASPARTATE MONOHYDRATE, DEXTROAMPHETAMINE SULFATE AND AMPHETAMINE SULFATE 7.5; 7.5; 7.5; 7.5 MG/1; MG/1; MG/1; MG/1
30 CAPSULE, EXTENDED RELEASE ORAL DAILY
Qty: 30 CAPSULE | Refills: 0 | Status: SHIPPED | OUTPATIENT
Start: 2022-05-12 | End: 2022-06-26 | Stop reason: SDUPTHER

## 2022-04-12 RX ORDER — DEXTROAMPHETAMINE SACCHARATE, AMPHETAMINE ASPARTATE MONOHYDRATE, DEXTROAMPHETAMINE SULFATE AND AMPHETAMINE SULFATE 7.5; 7.5; 7.5; 7.5 MG/1; MG/1; MG/1; MG/1
30 CAPSULE, EXTENDED RELEASE ORAL DAILY
Qty: 30 CAPSULE | Refills: 0 | Status: SHIPPED | OUTPATIENT
Start: 2022-04-12 | End: 2022-06-26 | Stop reason: SDUPTHER

## 2022-04-12 RX ORDER — FLUOXETINE 10 MG/1
10 CAPSULE ORAL DAILY
COMMUNITY
Start: 2022-02-28 | End: 2022-05-17

## 2022-04-12 RX ORDER — DEXTROAMPHETAMINE SACCHARATE, AMPHETAMINE ASPARTATE MONOHYDRATE, DEXTROAMPHETAMINE SULFATE AND AMPHETAMINE SULFATE 7.5; 7.5; 7.5; 7.5 MG/1; MG/1; MG/1; MG/1
30 CAPSULE, EXTENDED RELEASE ORAL DAILY
Qty: 30 CAPSULE | Refills: 0 | Status: SHIPPED | OUTPATIENT
Start: 2022-06-11 | End: 2022-06-26 | Stop reason: SDUPTHER

## 2022-04-12 NOTE — PROGRESS NOTES
[address] [facility] [address] [place] [address]   Office [phone], Fax [phone]      Chief Complaint:     Chief Complaint   Patient presents with    Follow Up Chronic Condition       Francesco Loyd is a 62 y.o. female that presents for: follow up chronic conditions       Assessment/Plan:       1. Brain fog  Assessment & Plan:  Suspect her concentration and brain fog 2/2 history of chemo is no longer receptive to Adderall XR 25mg. Pt apprehensive about adding another prn immediate release stimulant to her medications and would rather try increasing to higher dose XR   -STOP ritalin 5mg (would avoid mizing classes of stimulants if IR form is to be added)   -STOP Adderall XR 25mg   -START Adderall XR 30mg   -controlled substance agreement signed today   -drug screen   -PDMP reviewed and appropriate. Follow up 3 months   Orders:  -     [facility]; Future  -     amphetamine-dextroamphetamine XR (ADDERALL XR) 30 mg XR capsule; Take 1 Capsule by mouth daily for 29 days. Max Daily Amount: 30 mg., Normal, Disp-30 Capsule, R-0SUBSTITUTION PERMITTED  -     amphetamine-dextroamphetamine XR (ADDERALL XR) 30 mg XR capsule; Take 1 Capsule by mouth daily for 29 days. Max Daily Amount: 30 mg., Normal, Disp-30 Capsule, R-0SUBSTITUTION PERMITTED  -     amphetamine-dextroamphetamine XR (ADDERALL XR) 30 mg XR capsule; Take 1 Capsule by mouth daily for 29 days. Max Daily Amount: 30 mg., Normal, Disp-30 Capsule, R-0SUBSTITUTION PERMITTED  2. Poor concentration  Assessment & Plan:  See #1  Orders:  -     amphetamine-dextroamphetamine XR (ADDERALL XR) 30 mg XR capsule; Take 1 Capsule by mouth daily for 29 days. Max Daily Amount: 30 mg., Normal, Disp-30 Capsule, R-0SUBSTITUTION PERMITTED  -     amphetamine-dextroamphetamine XR (ADDERALL XR) 30 mg XR capsule; Take 1 Capsule by mouth daily for 29 days.  Max Daily Amount: 30 mg., Normal, Disp-30 Capsule, R-0SUBSTITUTION PERMITTED  - amphetamine-dextroamphetamine XR (ADDERALL XR) 30 mg XR capsule; Take 1 Capsule by mouth daily for 29 days. Max Daily Amount: 30 mg., Normal, Disp-30 Capsule, R-0SUBSTITUTION PERMITTED  3. B12 deficiency  Comments:  stabel off B12 injections. recheck B12 in 6 months     4. Depression due to physical illness  Comments:  stable on Prosac 30mg   5. LO (nonalcoholic steatohepatitis)  Comments:  stable. labs and notes reviewed   follow with Hepatology as scheduled   6. Type 2 diabetes mellitus with diabetic neuropathy, with long-term current use of insulin (HCC)  Comments:  continue current meds   follow with endocrine as scheduled for labs and f/u     7. Acquired hypothyroidism  Comments:  stable on synthroid 200mcg. follow with endocrine as scheduled          Follow up: Follow-up and Dispositions    · Return in 3 months (on 7/12/2022), or if symptoms worsen or fail to improve, for follow up with Dr. Kati Burkett for ADHD . Subjective:   HPI:  Janis Church is a 62 y.o. female that presents for:    HLD: increased crestor to 20mg in 9/2021   Her endocrinologist checks her cholesterol and has appt next week     Depression due to disease   Evaluated by Dr. Roberto Villa 8/17/21. No thoughts SI/HI. Meds: Prozac 30mg doing well on this dose and wants to stay on this. DM:   endocrinologist Dr. Emma Mobley at [facility] at the [facility]. Has an insluin pump. Goes every 3-4 months. Switched her insulin to Humalog 500 and stopped Trujeo. Continued ozempic once a week  Labs from 7/29/21   A1c: 8.5  Has a follow up with them next week. LO/NAFLD: follows with Dr. Niko Higginbotham. Labs on review have been stable     Hypothyroid:   Last TSH normal range   Complaint with synthroid 200mg 6 days per week   Has follow with Endocrine who will check TSH      Brain fog & poor concentration 2/2 history chemo/radiation: started on Adderall XR 25mg by [facility] neuro oncology (notes have already been reviewed) in 2/2021.       NOTE: she saw her sleep medicine specialist c/o morning fatigue. New CPAP was ordered which didn't seem to help. They started her on  Two medications for \"narcolepsy\" and day time fatigue which helped but thses were not covered by insurance. They swtiched her to Ritalin 5mg XR but she was scared to take this with the adderall XR     Health Maintenance:  Health Maintenance Due   Topic Date Due    DTaP/Tdap/Td series (1 - Tdap) Never done    Shingrix Vaccine Age 50> (1 of 2) Never done    Low dose CT lung screening  Never done    Foot Exam Q1  08/15/2014    Breast Cancer Screen Mammogram  05/22/2015    Colorectal Cancer Screening Combo  06/15/2020        ROS:   Review of Systems   Constitutional: Negative for chills and fever. HENT: Negative for congestion and sinus pain. Eyes: Negative for double vision. Respiratory: Negative for cough and shortness of breath. Cardiovascular: Negative for chest pain and palpitations. Gastrointestinal: Negative for abdominal pain, nausea and vomiting. Genitourinary: Negative for dysuria and hematuria. Musculoskeletal: Negative for myalgias. Skin: Negative for rash. Neurological: Negative for dizziness and headaches. Psychiatric/Behavioral: Negative for depression, substance abuse and suicidal ideas. The patient is not nervous/anxious and does not have insomnia. Poor concentration and brain fog worsening          Past medical history, social history, and medications personally reviewed.   Past Medical History:   Diagnosis Date    Anxiety     CAD (coronary artery disease)     CABG 1/3/2020    Cancer [facility])     R breast cancer dx 3/2014; chemo, radiation and double mastectomy - Dr. Nicho Giles Depression     Diabetes [facility])     Dyspepsia and other specified disorders of function of stomach     Hypercholesterolemia     Neuropathy     MARISA (obstructive sleep apnea)     wears cpap    Other and unspecified hyperlipidemia     Psychiatric disorder     Snoring  Stroke [facility]) 1980's    ?slight stroke due to oral contraceptives    Thyroid disease     Type II or unspecified type diabetes mellitus without mention of complication, uncontrolled     Unspecified hypothyroidism         Allergies personally reviewed. Allergies   Allergen Reactions    Ceftin [Cefuroxime Axetil] Hives    Trazodone Vertigo    Wellbutrin [Bupropion] Other (comments)     depression          Objective:   Vitals reviewed. Visit Vitals  /63 (BP 1 Location: Left upper arm, BP Patient Position: Sitting, BP Cuff Size: Adult)   Pulse 88   Resp 16   Wt 221 lb (100.2 kg)   SpO2 94%   BMI 39.15 kg/m²        Physical Exam  Physical Exam  Vitals and nursing note reviewed. HENT:      Head: Normocephalic and atraumatic. Eyes:      Conjunctiva/sclera: Conjunctivae normal.   Cardiovascular:      Rate and Rhythm: Normal rate and regular rhythm. Heart sounds: Normal heart sounds. No murmur heard. No friction rub. No gallop. Pulmonary:      Effort: Pulmonary effort is normal. No respiratory distress. Breath sounds: Normal breath sounds. No wheezing. Musculoskeletal:         General: Normal range of motion. Cervical back: Normal range of motion and neck supple. Skin:     General: Skin is warm and dry. Neurological:      Mental Status: She is alert. Psychiatric:         Mood and Affect: Affect normal.              Pt was discussed with Dr Valery Khan (attending physician). I have reviewed pertinent labs results and other data. I have discussed the diagnosis with the patient and the intended plan as seen in the above orders. The patient has received an after-visit summary and questions were answered concerning future plans. I have discussed medication side effects and warnings with the patient as well.     Bella Milner DO  Resident [facility]  04/12/22

## 2022-04-12 NOTE — ASSESSMENT & PLAN NOTE
Suspect her concentration and brain fog 2/2 history of chemo is no longer receptive to Adderall XR 25mg. Pt apprehensive about adding another prn immediate release stimulant to her medications and would rather try increasing to higher dose XR   -STOP ritalin 5mg (would avoid mizing classes of stimulants if IR form is to be added)   -STOP Adderall XR 25mg   -START Adderall XR 30mg   -controlled substance agreement signed today   -drug screen   -PDMP reviewed and appropriate.     Follow up 3 months

## 2022-04-16 LAB — DRUGS UR: NORMAL

## 2022-05-17 DIAGNOSIS — F06.31 MOOD DISORDER DUE TO KNOWN PHYSIOLOGICAL CONDITION WITH DEPRESSIVE FEATURES: ICD-10-CM

## 2022-05-17 RX ORDER — FLUOXETINE HYDROCHLORIDE 20 MG/1
CAPSULE ORAL
Qty: 90 CAPSULE | Refills: 1 | Status: SHIPPED | OUTPATIENT
Start: 2022-05-17

## 2022-05-17 RX ORDER — FLUOXETINE 10 MG/1
CAPSULE ORAL
Qty: 90 CAPSULE | Refills: 1 | Status: SHIPPED | OUTPATIENT
Start: 2022-05-17

## 2022-06-23 NOTE — PROGRESS NOTES
[facility] [address] [place] [address]   Office [phone], Fax [phone]      Chief Complaint:     Chief Complaint   Patient presents with    Medication Refill     med check       Hipolito Pinedo is a 62 y.o. female that presents for:       Assessment/Plan:     1. Brain fog  Assessment & Plan:  Poor concentration and brain fog 2/2 history of chemo. Started on adderall by [facility] neuro oncology in 2/2021 (records on file). Improved with higher dose Adderall XR   -cont Adderall 30mg XR   -Pt checking with pharmacy to see if she has 1 final refill left- will message if she doesn't   -PDMP reviewed and appropriate   -controlled substance on file from 4/12/22   -compliance drug okay 4/12/22  -follow up 3 months   2. Class 2 severe obesity due to excess calories with serious comorbidity and body mass index (BMI) of 39.0 to 39.9 in adult [facility])  Assessment & Plan:  already on GLP1 (ozempic). on high dose insulin which can contribute to difficulty losing weight   referral to nutrition   discussed bariatric surg referral (Dr. Arely Harvey)- Pt not ready for this at this time. May need to discuss this in future   Orders:  -     REFERRAL TO NUTRITION  3. Diabetic polyneuropathy associated with type 2 diabetes mellitus ([facility] [address]  Comments:  improved with higher dose lyrica. increase to 100mg BID   follow up 3 months   cont to see podiatry as needed   Orders:  -     pregabalin (LYRICA) 100 mg capsule; Take 1 Capsule by mouth two (2) times a day for 90 days. Max Daily Amount: 200 mg., Normal, Disp-180 Capsule, R-1  4. Poor concentration  Comments:  see #1         Follow up: Follow-up and Dispositions    · Return in about 3 months (around 9/24/2022) for Adderall for Brain fog/poor concentration . Subjective:   HPI:  Hipolito Pinedo is a 62 y.o. female that presents for:    Brain Fog 2/2 history chemo:   started on Adderall XR 25mg by  [facility] neuro oncology (notes have already been reviewed) in 2/2021. Last rx filled 5/26/22   Current Med: Adderall XR 30mg   Controlled substance agreement on file: yes signed 4/12/22. Compliance drug screen done at the time too   Substance abuse history no  Psych history no  Prior medications tried Ritalin 5mg and Adderall XR 25mg   Sleep: same as before   Weight: unchanged       Neuropathy   Worse in both feet however went to see podiatry Dr. Edinson Bruno, and he recommeded increasing lyrica to 200mg per day which she did on her own and states it helps. Hoping that if her neuropathy is better controlled she will be able to exercise easier. Health Maintenance:  Health Maintenance Due   Topic Date Due    DTaP/Tdap/Td series (1 - Tdap) Never done    Shingrix Vaccine Age 50> (1 of 2) Never done    Low dose CT lung screening  Never done    Pneumococcal 0-64 years (2 - PCV) 05/01/2018    Colorectal Cancer Screening Combo  06/15/2020        ROS:   Review of Systems   Constitutional: Negative for chills, fever, malaise/fatigue and weight loss. Eyes: Negative for blurred vision and double vision. Cardiovascular: Negative for chest pain and palpitations. Gastrointestinal: Negative for abdominal pain, constipation, diarrhea, nausea and vomiting. Skin: Negative for rash. Neurological: Negative for dizziness, tingling, tremors, seizures, weakness and headaches. Psychiatric/Behavioral: Negative for depression, hallucinations, memory loss, substance abuse and suicidal ideas. The patient is not nervous/anxious and does not have insomnia. Past medical history, social history, and medications personally reviewed.   Past Medical History:   Diagnosis Date    Anxiety     CAD (coronary artery disease)     CABG 1/3/2020    Cancer [facility])     R breast cancer dx 3/2014; chemo, radiation and double mastectomy - Dr. Sandip Trivedi Depression     Diabetes [facility])     Dyspepsia and other specified disorders of function of stomach     Hypercholesterolemia     Neuropathy  MARISA (obstructive sleep apnea)     wears cpap    Other and unspecified hyperlipidemia     Psychiatric disorder     Snoring     Stroke [facility]) 1980's    ?slight stroke due to oral contraceptives    Thyroid disease     Type II or unspecified type diabetes mellitus without mention of complication, uncontrolled     Unspecified hypothyroidism         Allergies personally reviewed. Allergies   Allergen Reactions    Ceftin [Cefuroxime Axetil] Hives    Trazodone Vertigo    Wellbutrin [Bupropion] Other (comments)     depression          Objective:   Vitals reviewed. Visit Vitals  /69 (BP 1 Location: Right arm, BP Patient Position: Sitting, BP Cuff Size: Adult)   Pulse 81   Wt 223 lb (101.2 kg)   SpO2 98%   BMI 39.50 kg/m²        Physical Exam  Physical Exam  Vitals and nursing note reviewed. HENT:      Head: Normocephalic and atraumatic. Eyes:      Conjunctiva/sclera: Conjunctivae normal.   Cardiovascular:      Rate and Rhythm: Normal rate and regular rhythm. Heart sounds: Normal heart sounds. No murmur heard. No friction rub. No gallop. Pulmonary:      Effort: Pulmonary effort is normal. No respiratory distress. Breath sounds: Normal breath sounds. No wheezing. Musculoskeletal:         General: Normal range of motion. Cervical back: Normal range of motion and neck supple. Skin:     General: Skin is warm and dry. Neurological:      Mental Status: She is alert. Psychiatric:         Mood and Affect: Affect normal.              Pt was discussed with Dr. Mook Foster (attending physician). I have reviewed pertinent labs results and other data. I have discussed the diagnosis with the patient and the intended plan as seen in the above orders. The patient has received an after-visit summary and questions were answered concerning future plans. I have discussed medication side effects and warnings with the patient as well.     Dustin Lopez, DO  Resident [address] [facility]  06/24/22

## 2022-06-24 ENCOUNTER — OFFICE VISIT (OUTPATIENT)
Dept: FAMILY MEDICINE CLINIC | Age: 62
End: 2022-06-24
Payer: COMMERCIAL

## 2022-06-24 ENCOUNTER — PATIENT MESSAGE (OUTPATIENT)
Dept: FAMILY MEDICINE CLINIC | Age: 62
End: 2022-06-24

## 2022-06-24 VITALS
BODY MASS INDEX: 39.5 KG/M2 | OXYGEN SATURATION: 98 % | HEART RATE: 81 BPM | SYSTOLIC BLOOD PRESSURE: 106 MMHG | WEIGHT: 223 LBS | DIASTOLIC BLOOD PRESSURE: 69 MMHG

## 2022-06-24 DIAGNOSIS — E11.42 DIABETIC POLYNEUROPATHY ASSOCIATED WITH TYPE 2 DIABETES MELLITUS (HCC): ICD-10-CM

## 2022-06-24 DIAGNOSIS — R41.840 POOR CONCENTRATION: ICD-10-CM

## 2022-06-24 DIAGNOSIS — R41.89 BRAIN FOG: ICD-10-CM

## 2022-06-24 DIAGNOSIS — E66.01 CLASS 2 SEVERE OBESITY DUE TO EXCESS CALORIES WITH SERIOUS COMORBIDITY AND BODY MASS INDEX (BMI) OF 39.0 TO 39.9 IN ADULT (HCC): ICD-10-CM

## 2022-06-24 DIAGNOSIS — R41.89 BRAIN FOG: Primary | ICD-10-CM

## 2022-06-24 PROBLEM — E66.812 CLASS 2 SEVERE OBESITY DUE TO EXCESS CALORIES WITH SERIOUS COMORBIDITY AND BODY MASS INDEX (BMI) OF 39.0 TO 39.9 IN ADULT: Status: ACTIVE | Noted: 2018-06-13

## 2022-06-24 PROBLEM — Z90.13 S/P MASTECTOMY, BILATERAL: Status: ACTIVE | Noted: 2022-06-24

## 2022-06-24 PROCEDURE — 99214 OFFICE O/P EST MOD 30 MIN: CPT | Performed by: STUDENT IN AN ORGANIZED HEALTH CARE EDUCATION/TRAINING PROGRAM

## 2022-06-24 RX ORDER — PREGABALIN 100 MG/1
100 CAPSULE ORAL 2 TIMES DAILY
Qty: 180 CAPSULE | Refills: 1 | Status: SHIPPED | OUTPATIENT
Start: 2022-06-24 | End: 2022-09-22

## 2022-06-24 NOTE — PROGRESS NOTES
Chief Complaint   Patient presents with    Medication Refill     med check     Visit Vitals  /69 (BP 1 Location: Right arm, BP Patient Position: Sitting, BP Cuff Size: Adult)   Pulse 81   Wt 223 lb (101.2 kg)   SpO2 98%   BMI 39.50 kg/m²

## 2022-06-24 NOTE — ASSESSMENT & PLAN NOTE
Poor concentration and brain fog 2/2 history of chemo. Started on adderall by [facility] in 2/2021 (records on file).   Improved with higher dose Adderall XR   -cont Adderall 30mg XR   -Pt checking with pharmacy to see if she has 1 final refill left- will message if she doesn't   -PDMP reviewed and appropriate   -controlled substance on file from 4/12/22   -compliance drug okay 4/12/22  -follow up 3 months

## 2022-06-24 NOTE — ASSESSMENT & PLAN NOTE
already on GLP1 (ozempic). on high dose insulin which can contribute to difficulty losing weight   referral to nutrition   discussed bariatric surg referral (Dr. Amara Sal)- Pt not ready for this at this time.   May need to discuss this in future

## 2022-06-24 NOTE — Clinical Note
This patient is going to see you in 3 months (September) for refill on her adderall XR. She takes this for brain fog and poor concentration s/p chemo- documentation from [facility] is in the media. Shes very compliant. Kind lady who I think you will like     Retouch on the obesity again. She is probably someone who is going to need bariatric surgery at some point.   I opened the door to the conversation but maybe she will be open to an actual referral this time

## 2022-06-26 RX ORDER — DEXTROAMPHETAMINE SACCHARATE, AMPHETAMINE ASPARTATE MONOHYDRATE, DEXTROAMPHETAMINE SULFATE AND AMPHETAMINE SULFATE 7.5; 7.5; 7.5; 7.5 MG/1; MG/1; MG/1; MG/1
30 CAPSULE, EXTENDED RELEASE ORAL DAILY
Qty: 30 CAPSULE | Refills: 0 | Status: SHIPPED | OUTPATIENT
Start: 2022-08-23 | End: 2022-08-01

## 2022-06-26 RX ORDER — DEXTROAMPHETAMINE SACCHARATE, AMPHETAMINE ASPARTATE MONOHYDRATE, DEXTROAMPHETAMINE SULFATE AND AMPHETAMINE SULFATE 7.5; 7.5; 7.5; 7.5 MG/1; MG/1; MG/1; MG/1
30 CAPSULE, EXTENDED RELEASE ORAL DAILY
Qty: 30 CAPSULE | Refills: 0 | Status: SHIPPED | OUTPATIENT
Start: 2022-07-24 | End: 2022-08-01

## 2022-06-26 RX ORDER — DEXTROAMPHETAMINE SACCHARATE, AMPHETAMINE ASPARTATE MONOHYDRATE, DEXTROAMPHETAMINE SULFATE AND AMPHETAMINE SULFATE 7.5; 7.5; 7.5; 7.5 MG/1; MG/1; MG/1; MG/1
30 CAPSULE, EXTENDED RELEASE ORAL DAILY
Qty: 30 CAPSULE | Refills: 0 | Status: SHIPPED | OUTPATIENT
Start: 2022-09-22 | End: 2022-09-23 | Stop reason: SDUPTHER

## 2022-06-26 NOTE — TELEPHONE ENCOUNTER
From: China Schwartz  To: Micaela Pérez DO  Sent: 6/24/2022 10:38 AM EDT  Subject: Adderall    [facility] is filling the Adderall today and as always has trouble with the ERICH # in their system. They said you can send in the next 3 months this afternoon.

## 2022-07-26 ENCOUNTER — TELEPHONE (OUTPATIENT)
Dept: SURGERY | Age: 62
End: 2022-07-26

## 2022-07-27 ENCOUNTER — TELEPHONE (OUTPATIENT)
Dept: ONCOLOGY | Age: 62
End: 2022-07-27

## 2022-07-27 NOTE — TELEPHONE ENCOUNTER
Spoke with patient and she noticed a lump under her right arm. The patient is very concerned and would like to be seen soon. I offered the patient an appointment on Monday 8/1.  She said she would like me to send him a message first.

## 2022-07-27 NOTE — TELEPHONE ENCOUNTER
Patient called back regarding lump under arm, patient would like to speak with nurse as soon as possible, please call

## 2022-07-28 NOTE — TELEPHONE ENCOUNTER
Spoke with patient and scheduled her on 8/1 at 9am to see Dr Alexsander Colmenares for lump under right arm. Ok per Dr Caroline Negrete.

## 2022-08-01 ENCOUNTER — OFFICE VISIT (OUTPATIENT)
Dept: SURGERY | Age: 62
End: 2022-08-01
Payer: COMMERCIAL

## 2022-08-01 ENCOUNTER — TELEPHONE (OUTPATIENT)
Dept: SURGERY | Age: 62
End: 2022-08-01

## 2022-08-01 VITALS
SYSTOLIC BLOOD PRESSURE: 110 MMHG | DIASTOLIC BLOOD PRESSURE: 60 MMHG | OXYGEN SATURATION: 98 % | RESPIRATION RATE: 20 BRPM | BODY MASS INDEX: 38.8 KG/M2 | HEIGHT: 63 IN | TEMPERATURE: 97.3 F | WEIGHT: 219 LBS | HEART RATE: 86 BPM

## 2022-08-01 DIAGNOSIS — Z17.0 MALIGNANT NEOPLASM OF UPPER-OUTER QUADRANT OF RIGHT BREAST IN FEMALE, ESTROGEN RECEPTOR POSITIVE (HCC): ICD-10-CM

## 2022-08-01 DIAGNOSIS — R22.31 AXILLARY MASS, RIGHT: Primary | ICD-10-CM

## 2022-08-01 DIAGNOSIS — C50.411 MALIGNANT NEOPLASM OF UPPER-OUTER QUADRANT OF RIGHT BREAST IN FEMALE, ESTROGEN RECEPTOR POSITIVE (HCC): ICD-10-CM

## 2022-08-01 PROCEDURE — 99214 OFFICE O/P EST MOD 30 MIN: CPT | Performed by: SURGERY

## 2022-08-01 NOTE — PROGRESS NOTES
HISTORY OF PRESENT ILLNESS  Jduy Núñez is a 65 y.o. female who comes in for follow up of her breast cancer and fullness in the right axilla  Skin Problem  Associated symptoms include chest pain. Pertinent negatives include no abdominal pain, no headaches and no shortness of breath. Breast Cancer  Associated symptoms include chest pain. Pertinent negatives include no abdominal pain, no headaches and no shortness of breath. Follow-up  Associated symptoms include chest pain. Pertinent negatives include no abdominal pain, no headaches and no shortness of breath. Breast Problem  Associated symptoms include chest pain. Pertinent negatives include no abdominal pain, no headaches and no shortness of breath. Procedure  Associated symptoms include chest pain. Pertinent negatives include no abdominal pain, no headaches and no shortness of breath. Chest Pain (Angina)   Associated symptoms include back pain. Pertinent negatives include no abdominal pain, no claudication, no cough, no diaphoresis, no dizziness, no fever, no headaches, no hemoptysis, no malaise/fatigue, no nausea, no orthopnea, no palpitations, no PND, no shortness of breath, no sputum production, no vomiting and no weakness. Surgical Follow-up  Associated symptoms include chest pain. Pertinent negatives include no abdominal pain, no headaches and no shortness of breath. Mass  Associated symptoms include chest pain. Pertinent negatives include no abdominal pain, no headaches and no shortness of breath. 8/1/2022. She has noted increased right axillary fullness and is concerned for malignancy. She denies trauma, skin changes, bruising in the area. She has not seen Dr Alfa Rodas or myself since early 2020. She was diagnosed in March 2014 with invasive mammary carcinoma ER/PA positive and her2/carlos unamplified. Breast MRI demonstrated the lesion to be 5 x 7.5 cm and SLNB demonstrated 4/7 positive nodes.    She has completed neoadjuvant chemotherapy with Dr Kevin Kirk and repeat MRI 7/15/2014 demonstrated near complete response with a 6 x 6 x 4 mm focus of enhancement near the 9-10 o'clock region middle third. She had menarche at 16, first of two pregnancies at 26, menopause at 51 and did not take HRT. Her mother had breast cancer in her 46s. No family hx of ovarian cancer. She underwent bilateral mastectomy and right ALND with reconstruction and had pCR. She developed infection in her expanders and they were removed Enriqueta Hernandez). She completed radiation and is took letrozole and anastrozole but did not tolerated them. She recently started tamoxifen but she gained 8 pounds in one week and felt terrible and stopped it. She has 23a nodular area laterally on the right chest which I did an FNA on in Jan 2015 and came back with some lymphocytes and blood but no sign of malignancy. She has pain under her right arm/axillary area. She had a right sided headache in May 2015 and brain MRI was negative. US core bx on 7/31/2015 demonstrated fragments of mature fibroadipose tissue with areas of fat necrosis without atypia or malignancy. Dr Tiffany Palencia tried fatgrafting and modification of her cosmesis in Nov 2015 without success. She still has some pain and fullness along the right chest wall.     Past Medical History:   Diagnosis Date    Anxiety     CAD (coronary artery disease)     CABG 1/3/2020    Calculus of kidney     Cancer ([facility] [address]     R breast cancer dx 3/2014; chemo, radiation and double mastectomy - Dr. Kevin Kirk    Depression     Diabetes [facility])     Dyspepsia and other specified disorders of function of stomach     Hypercholesterolemia     Liver disease     Neuropathy     MARISA (obstructive sleep apnea)     wears cpap    Other and unspecified hyperlipidemia     Psychiatric disorder     Snoring     Stroke [facility]) 1980's    ?slight stroke due to oral contraceptives    Thyroid disease     Type II or unspecified type diabetes mellitus without mention of complication, uncontrolled     Unspecified hypothyroidism      Past Surgical History:   Procedure Laterality Date    HX BREAST RECONSTRUCTION  2014    BILATERAL BREAST RECONSTRUCTION WITH TISSUE EXPANDERS AND ALLODERM performed by Hattie Gary MD at [facility] MAIN OR    HX BREAST RECONSTRUCTION Bilateral 10/18/2014    BREAST TISSUE EXPANDER REMOVAL ( BILATERAL )  performed by Hattie Gary MD at [facility] MAIN OR    HX CERVICAL FUSION      Full ROM per pt    HX COLONOSCOPY      follows with Kandice Solares    HX ORTHOPAEDIC      hand    HX TONSILLECTOMY      HX VASCULAR ACCESS  2014    adrian cath insertion    NJ BREAST SURGERY PROCEDURE UNLISTED  2014    excisionl bx of lymlh nodes (4) and double mastectomy     [place] CARDIAC SURG PROCEDURE UNLIST      2x CABG 1/3/2020    N[place] EXCISE HAND/FOOT NEUROMA      bilateral feet    N[place] SHOULDER SURG PROC UNLISTED      right     Family History   Problem Relation Age of Onset    Diabetes Mother         type 2    Thyroid Disease Mother     Heart Disease Mother     Cancer Mother 61        breast & lung    Hypertension Mother     Diabetes Father         type 1     Diabetes Sister         borderline    Dementia Other     Stroke Neg Hx      Social History     Tobacco Use    Smoking status: Former     Packs/day: 1.00     Years: 30.00     Pack years: 30.00     Types: Cigarettes     Quit date:      Years since quittin.5    Smokeless tobacco: Never   Vaping Use    Vaping Use: Never used   Substance Use Topics    Alcohol use: No    Drug use: No     Current Outpatient Medications   Medication Sig    [START ON 2022] amphetamine-dextroamphetamine XR (ADDERALL XR) 30 mg XR capsule Take 1 Capsule by mouth daily for 29 days. Max Daily Amount: 30 mg.    pregabalin (LYRICA) 100 mg capsule Take 1 Capsule by mouth two (2) times a day for 90 days. Max Daily Amount: 200 mg.     FLUoxetine (PROzac) 20 mg capsule TAKE 1 CAPSULE BY MOUTH EVERY DAY    FLUoxetine (PROzac) 10 mg capsule TAKE 1 CAPSULE BY MOUTH EVERY DAY    HUMULIN R U-500 (CONCENTRATED) INSULIN KWIKPEN 500 UNIT/ML INJECT 160 UNITS BEFORE BREAKFAST, 120 UNITS BEFORE DINNER SUBCUTANEOUSLY TWICE A DAY 30 DAYS    rosuvastatin (CRESTOR) 20 mg tablet Take 20 mg by mouth nightly. Dexcom G6 Sensor mary     furosemide (LASIX) 20 mg tablet TAKE 1 TABLET BY MOUTH EVERY DAY    metoprolol succinate (TOPROL-XL) 100 mg tablet TAKE 1 TABLET BY MOUTH EVERY DAY    semaglutide (Ozempic) 1 mg/dose (2 mg/1.5 mL) sub-q pen as directed    aspirin delayed-release 81 mg tablet Take  by mouth daily. levothyroxine (SYNTHROID) 200 mcg tablet Take 200 mcg by mouth Daily (before breakfast). ACCU-CHEK COMPACT TEST strip     Insulin Needles, Disposable, (BD INSULIN PEN NEEDLE UF SHORT) 31 X 5/16 \" Ndle by Does Not Apply route two (2) times a day. Current Facility-Administered Medications   Medication Dose Route Frequency    cyanocobalamin (VITAMIN B12) injection 1,000 mcg  1,000 mcg IntraMUSCular Q30D     Allergies   Allergen Reactions    Ceftin [Cefuroxime Axetil] Hives    Trazodone Vertigo    Wellbutrin [Bupropion] Other (comments)     depression       Review of Systems   Constitutional:  Negative for chills, diaphoresis, fever, malaise/fatigue and weight loss. HENT:  Negative for congestion, ear pain and sore throat. Eyes:  Negative for blurred vision, pain and discharge. Respiratory:  Negative for cough, hemoptysis, sputum production, shortness of breath, wheezing and stridor. Cardiovascular:  Positive for chest pain. Negative for palpitations, orthopnea, claudication, leg swelling and PND. Gastrointestinal:  Negative for abdominal pain, blood in stool, constipation, diarrhea, heartburn, melena, nausea and vomiting. Genitourinary:  Negative for dysuria, flank pain, frequency, hematuria and urgency. Musculoskeletal:  Positive for back pain, joint pain and myalgias. Negative for neck pain.    Skin:  Negative for itching and rash.   Neurological:  Negative for dizziness, tremors, speech change, focal weakness, seizures, loss of consciousness, weakness and headaches. Reduced cognition. Improved on Adderral   Endo/Heme/Allergies:  Negative for polydipsia. Does not bruise/bleed easily. Psychiatric/Behavioral:  Positive for depression. Negative for memory loss. The patient is nervous/anxious. Visit Vitals  /60 (BP 1 Location: Left upper arm, BP Patient Position: Sitting)   Pulse 86   Temp 97.3 °F (36.3 °C) (Temporal)   Resp 20   Ht 5' 3\" (1.6 m)   Wt 99.3 kg (219 lb)   SpO2 98%   BMI 38.79 kg/m²       Physical Exam  Exam conducted with a chaperone present. Constitutional:       General: She is not in acute distress. Appearance: She is well-developed. She is not diaphoretic. HENT:      Head: Normocephalic and atraumatic. Mouth/Throat:      Pharynx: No oropharyngeal exudate. Eyes:      General: No scleral icterus. Conjunctiva/sclera: Conjunctivae normal.      Pupils: Pupils are equal, round, and reactive to light. Neck:      Thyroid: No thyromegaly. Vascular: No JVD. Trachea: No tracheal deviation. Cardiovascular:      Rate and Rhythm: Normal rate and regular rhythm. Heart sounds: No murmur heard. No friction rub. No gallop. Pulmonary:      Effort: Pulmonary effort is normal. No respiratory distress. Breath sounds: Normal breath sounds. No wheezing or rales. Chest:   Breasts:     Breasts are symmetrical.      Right: Skin change (a lot of redundant skin and subcutaneous tissue with ??nodular ridge near axilla) present. No mass (fullness and nodularity at 0600), tenderness, axillary adenopathy or supraclavicular adenopathy. Left: No mass, skin change, tenderness, axillary adenopathy or supraclavicular adenopathy. Abdominal:      General: Bowel sounds are normal. There is no distension. Palpations: Abdomen is soft. There is no mass. Tenderness:  There is no abdominal tenderness. There is no guarding or rebound. Musculoskeletal:         General: Normal range of motion. Cervical back: Normal range of motion and neck supple. Lymphadenopathy:      Cervical: No cervical adenopathy. Upper Body:      Right upper body: No supraclavicular, axillary or pectoral adenopathy. Left upper body: No supraclavicular, axillary or pectoral adenopathy. Skin:     General: Skin is warm and dry. Coloration: Skin is not pale. Findings: No erythema or rash. Neurological:      Mental Status: She is alert and oriented to person, place, and time. Cranial Nerves: No cranial nerve deficit. Psychiatric:         Behavior: Behavior normal.         Thought Content: Thought content normal.         Judgment: Judgment normal.                          ASSESSMENT and PLAN  1. Right breast cancer T3N2M0:  Dx 3/2014 JOSE at 9 years and 5 months  She is now on Ferestan  She will discuss alternative therapies with Dr Elissa Wylie at next visit    2. Right axillary/breast nodule is ? ?scar vs recurrence (doubtful)  It is unchanged in 12 months and FNA was negative. 3.  Right axillary fullness/mass. Feels somewhat soft. Will check US to r/o recurrence. May require CT or bx if unclear    4. Lymphedema of RUE. Mild. Has seen lymphedema clinic and is currently not wearing compression garment  5. Poor cosmesis since implant loss. S/p fat grafting by Dr Tfifany Acevedo and prostheses prn  6. ?left axillary nodule is likely dermal cyst  7. Chemobrain. Now on adderal  8. CAD. S/p CAB x 2  9. Peripheral neuropathy.   Likely due to DM and chemo    Will call with US results    Tony Leroy MD FACS        Med Onc:  Emily Knox MD  PCP:  Josesito Fields MD

## 2022-08-01 NOTE — TELEPHONE ENCOUNTER
Betty Diallo with scheduling called regarding patient and stated patient is needing diagnostic mammogram and ultrasound order, patient is scheduled for 8/2, please attention order to Betty Diallo    Please call with questions  [phone]

## 2022-08-01 NOTE — PROGRESS NOTES
Identified pt with two pt identifiers(name and ). Reviewed record in preparation for visit and have obtained necessary documentation. All patient medications has been reviewed. Chief Complaint   Patient presents with    Mass     mass under Rt axilla which patient feels is new       Health Maintenance Due   Topic    DTaP/Tdap/Td series (1 - Tdap)    Shingrix Vaccine Age 50> (1 of 2)    Low dose CT lung screening     Pneumococcal 0-64 years (2 - PCV)    Colorectal Cancer Screening Combo     COVID-19 Vaccine (4 - Booster for Pfizer series)       Vitals:    22 0832   BP: 110/60   Pulse: 86   Resp: 20   Temp: 97.3 °F (36.3 °C)   TempSrc: Temporal   SpO2: 98%   Weight: 99.3 kg (219 lb)   Height: 5' 3\" (1.6 m)   PainSc:   3   PainLoc: Chest       4. Have you been to the ER, urgent care clinic since your last visit? Hospitalized since your last visit? No    5. Have you seen or consulted any other health care providers outside of the [address] [place] [address] since your last visit? Include any pap smears or colon screening. No      Patient is accompanied by self I have received verbal consent from Ashwin Horan to discuss any/all medical information while they are present in the room.

## 2022-08-02 ENCOUNTER — HOSPITAL ENCOUNTER (OUTPATIENT)
Dept: ULTRASOUND IMAGING | Age: 62
Discharge: HOME | End: 2022-08-02
Attending: SURGERY
Payer: COMMERCIAL

## 2022-08-02 DIAGNOSIS — R22.31 AXILLARY MASS, RIGHT: ICD-10-CM

## 2022-08-02 PROCEDURE — 76882 US LMTD JT/FCL EVL NVASC XTR: CPT

## 2022-08-03 ENCOUNTER — TELEPHONE (OUTPATIENT)
Dept: SURGERY | Age: 62
End: 2022-08-03

## 2022-08-03 DIAGNOSIS — Z85.3 HX: BREAST CANCER: ICD-10-CM

## 2022-08-03 DIAGNOSIS — R22.31 AXILLARY MASS, RIGHT: Primary | ICD-10-CM

## 2022-08-03 NOTE — TELEPHONE ENCOUNTER
US images do not suggest recurrence    Favor RTC 2-3 months for reevaluation  Will consider CT if persisting questions or other changes    Emma Tapia MD FACS

## 2022-08-03 NOTE — TELEPHONE ENCOUNTER
US done on Tuesday. Patient said she would like for Dr Angle Barbosa to look at the [address] and let her know if they did look at the area they should have and what does he feel is the next step. She felt very uneasy that they did not do a good job or look at the area in need. I let her know that we would get back in touch with her.

## 2022-08-03 NOTE — TELEPHONE ENCOUNTER
Patient called and would like to speak with nurse regarding mychart message she sent on 8/2, please call    [phone]

## 2022-08-04 NOTE — TELEPHONE ENCOUNTER
Spoke with patient to let her know what Dr Obdulio Bai recommendations are going forward. She is very concerned and feels that the lump is something more than what US is showing. Patient does not want to wait 2 months to do the CT scan, she would like to do it now.

## 2022-08-08 ENCOUNTER — TELEPHONE (OUTPATIENT)
Dept: SURGERY | Age: 62
End: 2022-08-08

## 2022-08-08 NOTE — TELEPHONE ENCOUNTER
Patient called and is wanting to speak with nurse regarding setting up CT, please call    [phone]

## 2022-08-10 ENCOUNTER — HOSPITAL ENCOUNTER (OUTPATIENT)
Dept: CT IMAGING | Age: 62
Discharge: HOME | End: 2022-08-10
Attending: SURGERY
Payer: COMMERCIAL

## 2022-08-10 DIAGNOSIS — R22.31 AXILLARY MASS, RIGHT: ICD-10-CM

## 2022-08-10 DIAGNOSIS — Z85.3 HX: BREAST CANCER: ICD-10-CM

## 2022-08-10 PROCEDURE — 74011000636 HC RX REV CODE- 636: Performed by: SURGERY

## 2022-08-10 PROCEDURE — 71260 CT THORAX DX C+: CPT

## 2022-08-10 RX ADMIN — IOPAMIDOL 100 ML: 755 INJECTION, SOLUTION INTRAVENOUS at 16:00

## 2022-09-21 ENCOUNTER — OFFICE VISIT (OUTPATIENT)
Dept: FAMILY MEDICINE CLINIC | Age: 62
End: 2022-09-21
Payer: COMMERCIAL

## 2022-09-21 VITALS
HEIGHT: 63 IN | HEART RATE: 86 BPM | OXYGEN SATURATION: 96 % | TEMPERATURE: 97 F | RESPIRATION RATE: 17 BRPM | WEIGHT: 222 LBS | BODY MASS INDEX: 39.34 KG/M2 | DIASTOLIC BLOOD PRESSURE: 59 MMHG | SYSTOLIC BLOOD PRESSURE: 113 MMHG

## 2022-09-21 DIAGNOSIS — R41.89 BRAIN FOG: ICD-10-CM

## 2022-09-21 DIAGNOSIS — R41.840 POOR CONCENTRATION: ICD-10-CM

## 2022-09-21 DIAGNOSIS — D49.2 SKIN GROWTH: ICD-10-CM

## 2022-09-21 DIAGNOSIS — J02.9 SORE THROAT: Primary | ICD-10-CM

## 2022-09-21 PROCEDURE — 99214 OFFICE O/P EST MOD 30 MIN: CPT | Performed by: STUDENT IN AN ORGANIZED HEALTH CARE EDUCATION/TRAINING PROGRAM

## 2022-09-21 NOTE — PROGRESS NOTES
Chief Complaint   Patient presents with    Medication Evaluation     1. Have you been to the ER, urgent care clinic since your last visit? Hospitalized since your last visit? 2. Have you seen or consulted any other health care providers outside of the [address] [place] [address] since your last visit? Include any pap smears or colon screening.  No

## 2022-09-21 NOTE — PROGRESS NOTES
[address] [facility]   Office [phone], Fax [phone]      Chief Complaint:     Chief Complaint   Patient presents with    Medication Evaluation       Addison Navarro is a 65 y.o. female that presents for: medication refill      Subjective:   HPI:  Addison Navarro is a 65 y.o. female that presents for:    Brain fog 2/2 chemo hx:   - Chemo in 2014 for breast cancer   - Started on stimulant medication in 2018  - On Adderall XR 25mg by [facility] neuro oncology (reviewed notes from 2/2021) and increased to 30mg with improvement in symptoms  - Last rx filled 8/27/22. - Current Med: Adderall XR 30mg   - Controlled substance agreement on file: yes signed 4/12/22. - Compliance drug screen: appropriate 4/2022.   - Substance abuse history no  - Psych history: depression  - Prior medications tried Ritalin 5mg and Adderall XR 25mg   - Sleep: unchanged  - Weight: unchanged   - Denies chest pain, palpitations on medicine     Weight gain:   - Seeing nutritionist next week    Sore throat:   - gets sore throat and HA believes from CPAP as she notes it in the morning  (will last a day)  - was with daughter last weekend who tested positive at home  - denies cough, SOB, increased sputum production, fevers or chill  - home test for COVID negative     Skin mole:   - noticed rapid growing about 3 months  - no previous hx of skin cancer  - follows with derm yearly, this wasn't present during her skin check    ROS: See HPI    Past medical history, social history, medications, and allergies personally reviewed.   Past Medical History:   Diagnosis Date    Anxiety     CAD (coronary artery disease)     CABG 1/3/2020    Calculus of kidney     Cancer ([facility] [address]     R breast cancer dx 3/2014; chemo, radiation and double mastectomy - Dr. Yenny Alva    Depression     Diabetes [facility])     Dyspepsia and other specified disorders of function of stomach     Hypercholesterolemia     Liver disease     Neuropathy     MARISA (obstructive sleep apnea)     wears cpap    Other and unspecified hyperlipidemia     Psychiatric disorder     Snoring     Stroke [facility]) 1980's    ?slight stroke due to oral contraceptives    Thyroid disease     Type II or unspecified type diabetes mellitus without mention of complication, uncontrolled     Unspecified hypothyroidism        Medications:   Current Outpatient Medications   Medication Sig    amphetamine-dextroamphetamine XR (ADDERALL XR) 30 mg XR capsule Take 1 Capsule by mouth daily for 29 days. Max Daily Amount: 30 mg.    pregabalin (LYRICA) 100 mg capsule Take 1 Capsule by mouth two (2) times a day for 90 days. Max Daily Amount: 200 mg. FLUoxetine (PROzac) 20 mg capsule TAKE 1 CAPSULE BY MOUTH EVERY DAY    FLUoxetine (PROzac) 10 mg capsule TAKE 1 CAPSULE BY MOUTH EVERY DAY    HUMULIN R U-500 (CONCENTRATED) INSULIN KWIKPEN 500 UNIT/ML INJECT 160 UNITS BEFORE BREAKFAST, 120 UNITS BEFORE DINNER SUBCUTANEOUSLY TWICE A DAY 30 DAYS    Dexcom G6 Sensor mary     furosemide (LASIX) 20 mg tablet TAKE 1 TABLET BY MOUTH EVERY DAY    metoprolol succinate (TOPROL-XL) 100 mg tablet TAKE 1 TABLET BY MOUTH EVERY DAY    semaglutide (Ozempic) 1 mg/dose (2 mg/1.5 mL) sub-q pen as directed    aspirin delayed-release 81 mg tablet Take  by mouth daily. levothyroxine (SYNTHROID) 200 mcg tablet Take 200 mcg by mouth Daily (before breakfast). ACCU-CHEK COMPACT TEST strip     Insulin Needles, Disposable, (BD INSULIN PEN NEEDLE UF SHORT) 31 X 5/16 \" Ndle by Does Not Apply route two (2) times a day. rosuvastatin (CRESTOR) 20 mg tablet TAKE 1 TABLET BY MOUTH NIGHTLY     Current Facility-Administered Medications   Medication Dose Route Frequency    cyanocobalamin (VITAMIN B12) injection 1,000 mcg  1,000 mcg IntraMUSCular Q30D        Allergies:   Allergies   Allergen Reactions    Ceftin [Cefuroxime Axetil] Hives    Trazodone Vertigo    Wellbutrin [Bupropion] Other (comments)     depression        Health Maintenance:  Health Maintenance Due   Topic Date Due    DTaP/Tdap/Td series (1 - Tdap) Never done    Shingrix Vaccine Age 50> (1 of 2) Never done    Low dose CT lung screening  Never done    Colorectal Cancer Screening Combo  06/15/2020    COVID-19 Vaccine (4 - Booster for Pfizer series) 04/16/2022    Flu Vaccine (1) 08/01/2022    Lipid Screen  09/24/2022      Objective:   Vitals reviewed. Visit Vitals  BP (!) 113/59   Pulse 86   Temp 97 °F (36.1 °C) (Temporal)   Resp 17   Ht 5' 3\" (1.6 m)   Wt 222 lb (100.7 kg)   SpO2 96%   BMI 39.33 kg/m²        Physical Exam:  General Alert. No distress. Not diaphoretic. No jaundice, cyanosis, pallor. Throat  No exudate, some very mild erythema   HENT Head Normocephalic and atraumatic. Eyes Conjunctivae pink, no discharge. No scleral icterus. EOMI. Cardio Normal rate, regular rhythm. No murmur, gallop, or friction rub. No chest wall tenderness. Pulmonary Effort normal. Breath sounds normal. No respiratory distress. No wheezes, rales, or rhonchi. No Stridor.  Deferred. Extremities No edema of lower extremities. No tenderness. Neurological No focal deficits. Skin Atypical mole as below. Skin is warm and dry. No rash noted. No erythema. Psychiatric Mood, affect, and judgment normal.          Assessment/Plan:     1. Sore throat  Patient w/ mild symptoms. Will test for COVID given exposure. -     NOVEL CORONAVIRUS (COVID-19)    2. Brain fog  Stable on Adderall 30mg XR. Reviewed PDMP - had hand surgery in June, short term rx of tramadol. UDS on file appropriate, CSC on file. Follow up in 3 months. - Adderall XR 30mg daily     3. Skin growth   Patient to follow up with her derm. Will likely need biopsy. Patient to call back if unable to be seen and will have her follow up with our clinic. Follow up:   Return in about 2 weeks (around 10/5/2022) for Health maintenence visit. Pt was discussed with Dr Boone Castanon (attending physician).     I have reviewed pertinent labs results and other data. I have discussed the diagnosis with the patient and the intended plan as seen in the above orders. The patient has received an after-visit summary and questions were answered concerning future plans. I have discussed medication side effects and warnings with the patient as well.     Hector Umanzor MD  Resident [facility]  09/22/22

## 2022-09-22 ENCOUNTER — PATIENT MESSAGE (OUTPATIENT)
Dept: FAMILY MEDICINE CLINIC | Age: 62
End: 2022-09-22

## 2022-09-22 DIAGNOSIS — E78.2 MIXED HYPERLIPIDEMIA: ICD-10-CM

## 2022-09-22 RX ORDER — ROSUVASTATIN CALCIUM 20 MG/1
TABLET, COATED ORAL
Qty: 90 TABLET | Refills: 3 | Status: SHIPPED | OUTPATIENT
Start: 2022-09-22

## 2022-09-23 ENCOUNTER — TELEPHONE (OUTPATIENT)
Dept: FAMILY MEDICINE CLINIC | Age: 62
End: 2022-09-23

## 2022-09-23 RX ORDER — DEXTROAMPHETAMINE SACCHARATE, AMPHETAMINE ASPARTATE MONOHYDRATE, DEXTROAMPHETAMINE SULFATE AND AMPHETAMINE SULFATE 7.5; 7.5; 7.5; 7.5 MG/1; MG/1; MG/1; MG/1
30 CAPSULE, EXTENDED RELEASE ORAL DAILY
Qty: 30 CAPSULE | Refills: 0 | Status: SHIPPED | OUTPATIENT
Start: 2022-10-20

## 2022-09-24 LAB
SARS-COV-2, NAA 2 DAY TAT: NORMAL
SARS-COV-2, NAA: NOT DETECTED

## 2022-09-27 ENCOUNTER — HOSPITAL ENCOUNTER (OUTPATIENT)
Dept: NUTRITION | Age: 62
End: 2022-09-27

## 2022-09-27 NOTE — TELEPHONE ENCOUNTER
Called patient to follow up on message. COVID negative. Discussed that she likely doesn't need health maintenance visit follow up. Will get records sent for recent colonoscopy. Gets lipid panel every 6 months with endo. Will get shingles shot. Tetanus 5 years ago. Will send over records so we can update our system.     Carlos Cota, DO  PGY-2 Resident  [address] Medicine

## 2022-09-27 NOTE — TELEPHONE ENCOUNTER
----- Message from Nicki Fine sent at 9/23/2022 12:26 PM EDT -----  Subject: Results Request    QUESTIONS  Results: Covid test; Ordered by:   Date Performed:   ---------------------------------------------------------------------------  --------------  Ivan Mcgraw [facility]    [phone]; OK to leave message on voicemail  ---------------------------------------------------------------------------  --------------

## 2022-10-20 ENCOUNTER — VIRTUAL VISIT (OUTPATIENT)
Dept: FAMILY MEDICINE CLINIC | Age: 62
End: 2022-10-20
Payer: COMMERCIAL

## 2022-10-20 DIAGNOSIS — J00 ACUTE NASOPHARYNGITIS: Primary | ICD-10-CM

## 2022-10-20 PROCEDURE — 99213 OFFICE O/P EST LOW 20 MIN: CPT | Performed by: STUDENT IN AN ORGANIZED HEALTH CARE EDUCATION/TRAINING PROGRAM

## 2022-10-20 NOTE — PROGRESS NOTES
[address] Medicine Residency Attending Addendum:  Dr. Felisa Cooks, MD,  the patient and I were not physically present during this encounter. The resident and I are concurrently monitoring the patient care through appropriate telecommunication technology. I discussed the findings, assessment and plan with the resident and agree with the resident's findings and plan as documented in the resident's note.       Josh Covington MD

## 2022-10-20 NOTE — PROGRESS NOTES
Juwan Rocha  65 y.o. female  1960  [address]  [id number]   [address]:    Telemedicine Progress Note  Grady Huang MD       Encounter Date and Time: October 20, 2022 at 10:00 AM    Consent: Juwan Rocha, who was seen by synchronous (real-time) audio-video technology, and/or her healthcare decision maker, is aware that this patient-initiated, Telehealth encounter on 10/20/2022 is a billable service, with coverage as determined by her insurance carrier. She is aware that she may receive a bill and has provided verbal consent to proceed: Yes. Chief Complaint   Patient presents with    Cold Symptoms       History of Present Illness   Juwan Rocha is a 65 y.o. female was evaluated by synchronous (real-time) audio-video technology from home, through a secure patient portal.    Congestion, sinus pressure, sore throat for 5 days. Now starting to have a dry cough and feel chest congestion. Greenish-yellow mucus when she blows her nose. Has taken temp, been around , never more than 100. Feeling chills and aches. Cough is dry. Has tried Nyquil, tylenol, with little relief. Tested negative for COVID this AM.     Review of Systems   Review of Systems   Constitutional:  Positive for chills and malaise/fatigue. HENT:  Positive for congestion, sinus pain and sore throat. Negative for ear discharge and ear pain. Eyes:  Negative for double vision and discharge. Respiratory:  Positive for cough. Negative for sputum production, shortness of breath and wheezing. Cardiovascular:  Negative for chest pain. Gastrointestinal:  Negative for diarrhea and vomiting. Genitourinary:  Negative for dysuria and frequency.      Vitals/Objective:     General: alert, cooperative, no distress   Mental  status: mental status: alert, oriented to person, place, and time, normal mood, behavior, speech, dress, motor activity, and thought processes   Resp: resp: normal effort and no respiratory distress   Neuro: neuro: no gross deficits   Skin: skin: no discoloration or lesions of concern on visible areas   Due to this being a TeleHealth evaluation, many elements of the physical examination are unable to be assessed. Assessment and Plan:   Time-based coding, delete if not needed: I spent at least 15 minutes with this established patient, and >50% of the time was spent counseling and/or coordinating care regarding cold-like symptoms. 1. Acute nasopharyngitis  Symptoms consistent with acute nasopharyngitis for 5 days. Negative for COVID. No fevers or purulence. Discussed over-the-counter remedies: Flonase and saline spray for nasal congestion, Mucinex for cough, Tylenol for aches and chills. Given her LO, I recommended daily maximum of 3 g of Tylenol. Given her history of CABG, I recommended avoiding NSAIDs. Discussed that this is likely a viral etiology, but can progress to a bacterial 1, so she should follow-up if continuing to have symptoms in 1 week. Time spent in direct conversation with the patient to include medical condition(s) discussed, assessment and treatment plan:    We discussed the expected course, resolution and complications of the diagnosis(es) in detail. Medication risks, benefits, costs, interactions, and alternatives were discussed as indicated. I advised her to contact the office if her condition worsens, changes or fails to improve as anticipated. She expressed understanding with the diagnosis(es) and plan. Patient understands that this encounter was a temporary measure, and the importance of further follow up and examination was emphasized. Patient verbalized understanding. Patient informed to follow up: Follow-up and Dispositions    Return in about 1 week (around 10/27/2022), or if symptoms worsen or fail to improve.          Electronically Signed: Jennifer Chavez MD    CPT Codes 89665-33126 for Established Patients may apply to this Telehealth Visit. POS code: 18. Modifier GT    Venita Clark is a 65 y.o. female who was evaluated by an audio-video encounter for concerns as above. Patient identification was verified prior to start of the visit. A caregiver was present when appropriate. Due to this being a TeleHealth encounter (During SDETD-77 public health emergency), evaluation of the following organ systems was limited: Vitals/Constitutional/EENT/Resp/CV/GI//MS/Neuro/Skin/Heme-Lymph-Imm. Pursuant to the emergency declaration under the [facility], [facility] waiver authority and the [employer] and [employer] Act, this Virtual Visit was conducted, with patient's (and/or legal guardian's) consent, to reduce the patient's risk of exposure to COVID-19 and provide necessary medical care. Services were provided through a synchronous discussion virtually to substitute for in-person clinic visit. I was at home. The patient was at home. History   Patients past medical, surgical and family histories were reviewed and updated.       Past Medical History:   Diagnosis Date    Anxiety     CAD (coronary artery disease)     CABG 1/3/2020    Calculus of kidney     Cancer ([facility] [address]     R breast cancer dx 3/2014; chemo, radiation and double mastectomy - Dr. Real Valentine    Depression     Diabetes [facility])     Dyspepsia and other specified disorders of function of stomach     Hypercholesterolemia     Liver disease     Neuropathy     MARISA (obstructive sleep apnea)     wears cpap    Other and unspecified hyperlipidemia     Psychiatric disorder     Snoring     Stroke [facility]) 1980's    ?slight stroke due to oral contraceptives    Thyroid disease     Type II or unspecified type diabetes mellitus without mention of complication, uncontrolled     Unspecified hypothyroidism      Past Surgical History:   Procedure Laterality Date    HX BREAST RECONSTRUCTION  08/14/2014    BILATERAL BREAST RECONSTRUCTION WITH TISSUE EXPANDERS AND ALLODERM performed by Tessie Garsia MD at [facility] MAIN OR    HX BREAST RECONSTRUCTION Bilateral 10/18/2014    BREAST TISSUE EXPANDER REMOVAL ( BILATERAL )  performed by Tessie Garsia MD at [facility] MAIN OR    HX CERVICAL FUSION      Full ROM per pt    HX COLONOSCOPY      follows with Alonzo Parisi    HX ORTHOPAEDIC      hand    HX TONSILLECTOMY      HX VASCULAR ACCESS  2014    adrian cath insertion    PA BREAST SURGERY PROCEDURE UNLISTED  2014    excisionl bx of lymlh nodes (4) and double mastectomy     PA CARDIAC SURG PROCEDURE UNLIST      2x CABG 1/3/2020    PA EXCISE HAND/FOOT NEUROMA      bilateral feet    PA SHOULDER SURG PROC UNLISTED      right     Family History   Problem Relation Age of Onset    Diabetes Mother         type 2    Thyroid Disease Mother     Heart Disease Mother     Cancer Mother 61        breast & lung    Hypertension Mother     Diabetes Father         type 1     Diabetes Sister         borderline    Dementia Other     Stroke Neg Hx      Social History     Tobacco Use    Smoking status: Former     Packs/day: 1.00     Years: 30.00     Pack years: 30.00     Types: Cigarettes     Quit date:      Years since quittin    Smokeless tobacco: Never   Vaping Use    Vaping Use: Never used   Substance Use Topics    Alcohol use: No    Drug use: No     Patient Active Problem List   Diagnosis Code    Type 2 diabetes mellitus with neurologic complication, with long-term current use of insulin (HCC) E11.49, Z79.4    Other and unspecified hyperlipidemia E78.5    Hypothyroidism E03.9    Breast cancer of upper-outer quadrant of right female breast ([facility] [address] C50.411    Lymphedema of upper extremity following lymphadenectomy E89.89, I89.0    Osteopenia M85.80    Class 2 severe obesity due to excess calories with serious comorbidity and body mass index (BMI) of 39.0 to 39.9 in adult ([facility] [address] E66.01, Z68.39    Chest wall mass R22.2    B12 deficiency E53.8    Depression due to physical illness F06.31    Insomnia due to anxiety and fear F51.05, F40.9    Brain fog R41.89    Poor concentration R41.840    Coronary artery disease involving coronary bypass graft of native heart without angina pectoris I25.810    LO (nonalcoholic steatohepatitis) K75.81    Tinnitus of both ears H93.13    S/P mastectomy, bilateral Z90.13    Diabetic polyneuropathy associated with type 2 diabetes mellitus (HCC) E11.42    Axillary mass, right R22.31          Current Medications/Allergies   Medications and Allergies reviewed:    Current Outpatient Medications   Medication Sig Dispense Refill    amphetamine-dextroamphetamine XR (ADDERALL XR) 30 mg XR capsule Take 1 Capsule by mouth daily. Max Daily Amount: 30 mg. 30 Capsule 0    rosuvastatin (CRESTOR) 20 mg tablet TAKE 1 TABLET BY MOUTH NIGHTLY 90 Tablet 3    FLUoxetine (PROzac) 20 mg capsule TAKE 1 CAPSULE BY MOUTH EVERY DAY 90 Capsule 1    FLUoxetine (PROzac) 10 mg capsule TAKE 1 CAPSULE BY MOUTH EVERY DAY 90 Capsule 1    HUMULIN R U-500 (CONCENTRATED) INSULIN KWIKPEN 500 UNIT/ML INJECT 160 UNITS BEFORE BREAKFAST, 120 UNITS BEFORE DINNER SUBCUTANEOUSLY TWICE A DAY 30 DAYS      Dexcom G6 Sensor mary       furosemide (LASIX) 20 mg tablet TAKE 1 TABLET BY MOUTH EVERY DAY      metoprolol succinate (TOPROL-XL) 100 mg tablet TAKE 1 TABLET BY MOUTH EVERY DAY      semaglutide (Ozempic) 1 mg/dose (2 mg/1.5 mL) sub-q pen as directed      aspirin delayed-release 81 mg tablet Take  by mouth daily. levothyroxine (SYNTHROID) 200 mcg tablet Take 200 mcg by mouth Daily (before breakfast). ACCU-CHEK COMPACT TEST strip   0    Insulin Needles, Disposable, (BD INSULIN PEN NEEDLE UF SHORT) 31 X 5/16 \" Ndle by Does Not Apply route two (2) times a day.  1 Package 11     Current Facility-Administered Medications   Medication Dose Route Frequency Provider Last Rate Last Admin    cyanocobalamin (VITAMIN B12) injection 1,000 mcg  1,000 mcg IntraMUSCular Q30D Gar Krabbe, DO   1,000 mcg at 09/24/21 8534     Allergies   Allergen Reactions    Ceftin [Cefuroxime Axetil] Hives    Trazodone Vertigo    Wellbutrin [Bupropion] Other (comments)     depression

## 2022-10-25 ENCOUNTER — OFFICE VISIT (OUTPATIENT)
Dept: FAMILY MEDICINE CLINIC | Age: 62
End: 2022-10-25
Payer: COMMERCIAL

## 2022-10-25 VITALS
SYSTOLIC BLOOD PRESSURE: 96 MMHG | HEIGHT: 63 IN | TEMPERATURE: 98.1 F | BODY MASS INDEX: 39.33 KG/M2 | DIASTOLIC BLOOD PRESSURE: 63 MMHG | HEART RATE: 92 BPM | OXYGEN SATURATION: 96 % | RESPIRATION RATE: 16 BRPM

## 2022-10-25 DIAGNOSIS — M79.674 TOE PAIN, RIGHT: Primary | ICD-10-CM

## 2022-10-25 PROCEDURE — 99213 OFFICE O/P EST LOW 20 MIN: CPT | Performed by: FAMILY MEDICINE

## 2022-10-25 RX ORDER — TIRZEPATIDE 5 MG/.5ML
INJECTION, SOLUTION SUBCUTANEOUS
COMMUNITY
Start: 2022-10-04

## 2022-10-25 RX ORDER — PREGABALIN 100 MG/1
CAPSULE ORAL
COMMUNITY
Start: 2022-09-22

## 2022-10-25 NOTE — PROGRESS NOTES
Subjective   Licha Cunningham is a 65 y.o. female who presents for Toe Pain (Right 2nd toe times one week. No injury noted. )    R 2nd toe pain  She has bilateral neuropathy for several years. Over last week, worsening 2nd toe pain and swelling. The pain is intermittent. When present, it lasts for a few seconds at a time. The patient describes the pain as \"burning and zinging. \" There is not radiation. She says it is a 10/10 in severity. Nothing specific makes it worse. She has tried bengay, with mild relief. She takes Lyrica, which helps. There are no associated symptoms. Review of Systems   Review of Systems   Constitutional:  Negative for chills and fever. HENT:  Negative for congestion and sore throat. Eyes:  Negative for discharge and redness. Respiratory:  Negative for cough and shortness of breath. Cardiovascular:  Negative for chest pain and palpitations. Gastrointestinal:  Negative for nausea and vomiting. Genitourinary:  Negative for difficulty urinating and dysuria. Musculoskeletal:  Positive for arthralgias (R second toe) and gait problem. Negative for neck stiffness. Neurological:  Negative for weakness and numbness.       Medical History  Past Medical History:   Diagnosis Date    Anxiety     CAD (coronary artery disease)     CABG 1/3/2020    Calculus of kidney     Cancer ([facility] [address]     R breast cancer dx 3/2014; chemo, radiation and double mastectomy - Dr. Lane Benites    Depression     Diabetes [facility])     Dyspepsia and other specified disorders of function of stomach     Hypercholesterolemia     Liver disease     Neuropathy     MARISA (obstructive sleep apnea)     wears cpap    Other and unspecified hyperlipidemia     Psychiatric disorder     Snoring     Stroke [facility]) 1980's    ?slight stroke due to oral contraceptives    Thyroid disease     Type II or unspecified type diabetes mellitus without mention of complication, uncontrolled     Unspecified hypothyroidism        Medications  Current Outpatient Medications   Medication Sig    pregabalin (LYRICA) 100 mg capsule TAKE 1 CAPSULE BY MOUTH TWO (2) TIMES A DAY FOR 90 DAYS. MAX DAILY AMOUNT: 200 MG. Mounjaro 5 mg/0.5 mL pnij INJECT 5MG UNDER THE SKIN ONCE A WEEK    amphetamine-dextroamphetamine XR (ADDERALL XR) 30 mg XR capsule Take 1 Capsule by mouth daily. Max Daily Amount: 30 mg.    rosuvastatin (CRESTOR) 20 mg tablet TAKE 1 TABLET BY MOUTH NIGHTLY    FLUoxetine (PROzac) 20 mg capsule TAKE 1 CAPSULE BY MOUTH EVERY DAY    FLUoxetine (PROzac) 10 mg capsule TAKE 1 CAPSULE BY MOUTH EVERY DAY    HUMULIN R U-500 (CONCENTRATED) INSULIN KWIKPEN 500 UNIT/ML INJECT 160 UNITS BEFORE BREAKFAST, 120 UNITS BEFORE DINNER SUBCUTANEOUSLY TWICE A DAY 30 DAYS    Dexcom G6 Sensor mary     furosemide (LASIX) 20 mg tablet TAKE 1 TABLET BY MOUTH EVERY DAY    metoprolol succinate (TOPROL-XL) 100 mg tablet TAKE 1 TABLET BY MOUTH EVERY DAY    aspirin delayed-release 81 mg tablet Take  by mouth daily. levothyroxine (SYNTHROID) 200 mcg tablet Take 200 mcg by mouth Daily (before breakfast). ACCU-CHEK COMPACT TEST strip     Insulin Needles, Disposable, (BD INSULIN PEN NEEDLE UF SHORT) 31 X 5/16 \" Ndle by Does Not Apply route two (2) times a day.     semaglutide (Ozempic) 1 mg/dose (2 mg/1.5 mL) sub-q pen as directed (Patient not taking: Reported on 10/25/2022)     Current Facility-Administered Medications   Medication Dose Route Frequency    cyanocobalamin (VITAMIN B12) injection 1,000 mcg  1,000 mcg IntraMUSCular Q30D       Immunizations   Immunization History   Administered Date(s) Administered    COVID-19, PFIZER PURPLE top, DILUTE for use, (age 15 y+), IM, 30mcg/0.3mL 03/24/2021, 04/12/2021, 12/16/2021    Influenza Vaccine 11/04/2014, 10/02/2017, 11/07/2018, 02/10/2020, 09/23/2020    Influenza, FLUARIX, FLULAVAL, FLUZONE (age 10 mo+) AND AFLURIA, (age 1 y+), PF, 0.5mL 11/01/2019, 10/27/2021    Pneumococcal Polysaccharide (PPSV-23) 05/01/2017       Allergies   Allergies Allergen Reactions    Ceftin [Cefuroxime Axetil] Hives    Trazodone Vertigo    Wellbutrin [Bupropion] Other (comments)     depression       Objective   Vital Signs  Visit Vitals  BP 96/63   Pulse 92   Temp 98.1 °F (36.7 °C) (Oral)   Resp 16   Ht 5' 3\" (1.6 m)   SpO2 96%   BMI 39.33 kg/m²       Physical Examination  Physical Exam  Cardiovascular:      Pulses:           Dorsalis pedis pulses are 2+ on the right side. Posterior tibial pulses are 2+ on the right side. Musculoskeletal:      Right foot: Normal range of motion. No deformity, bunion, Charcot foot, foot drop or prominent metatarsal heads. Left foot: Normal range of motion. Feet:      Right foot:      Protective Sensation: 10 sites tested. 2 sites sensed. Skin integrity: Callus and dry skin present. No ulcer, blister, skin breakdown, erythema, warmth or fissure. Toenail Condition: Right toenails are normal.      Left foot:      Skin integrity: Skin integrity normal.      Toenail Condition: Left toenails are normal.        Assessment and Plan   Cass Bentley is a 65 y.o. female who presents for Toe Pain (Right 2nd toe times one week. No injury noted. )    1. Toe pain, right  Most likely diagnosis at this point is worsening neuropathy or osteoarthritis. Exam is not consistent with gout, not erythematous or warm. No wounds or ulcers. We will start with conservative treatment, and follow-up if symptoms persist.   -Tylenol maximum 3 g daily (1000 mg every 8)  -Lidocaine patch as needed  -Follow-up in 1 week, consider imaging at this visit    Return in about 1 week (around 11/1/2022). Pt was discussed with Dr. Ruth Tabor (attending physician). I have reviewed patient medical and social history and medications. I have reviewed pertinent labs results and other data. I have discussed the diagnosis with the patient and the intended plan as seen in the above orders.  The patient has received an after-visit summary and questions were answered concerning future plans. I have discussed medication side effects and warnings with the patient as well.     Brice Hernandez MD  Resident, [facility]  10/25/22

## 2022-10-25 NOTE — PROGRESS NOTES
Chief Complaint   Patient presents with    Toe Pain     Right 2nd toe times one week. No injury noted. 1. Have you been to the ER, urgent care clinic since your last visit? Hospitalized since your last visit? No    2. Have you seen or consulted any other health care providers outside of the [address] [place] [address] since your last visit? Include any pap smears or colon screening.  No

## 2022-11-08 NOTE — PROGRESS NOTES
[address] [facility] [address] [place], [place] [address]    Office [phone], Fax [phone]    Subjective:     Chief Complaint   Patient presents with   [facility].     Would like to discuss depression. She is scheduled to speak to a counselor already. She was diagnosed with \"chemo brain\" and has tinnitus all day \"and going to drive me crazy\"  Had neuropsych testing with Dr. Kenzie Moreno. History provided by patient     HPI:  Ressie Kehr is a 62 y.o. WHITE/NON- female with past medical history of Breast cancer s/p BL mastectomy, hypothyroid, DM follow by endocrine, HLD,  presents for   Chief Complaint   Patient presents with   [facility].     Would like to discuss depression. She is scheduled to speak to a counselor already. She was diagnosed with \"chemo brain\" and has tinnitus all day \"and going to drive me crazy\"  Had neuropsych testing with Dr. Kenzie Moreno. B12 deficiency   Once a month B12 injections and has her neighbor administer them. Brought it today to have us inject them since she is past due       DM   Has an endocrinologist Dr. Diego Jacobs at [facility]. Has an insluin pump. Goes every 3-4 months. Take Toujeo 100U daily & ozempic once a week in addition to her pump. Labs from 7/29/21   A1c 8.5. CMP: Cr 0.69, GFR 92, AST 31, ALT 31, ALK 84, K 4.6, Na141, Jorge 9.7    HLD   Her endocrinologist check her cholesterol 1 year ago. Compliant with statin     Swelling & SOB    Bilateral LE equally as well as in stomach with intermittent SOB with exertion. See's cardiology ([facility]) and states had a stress test 4-5 months ago. No prior h/o CHF. Does have h/o MI with CABG x2. No current CP or SOB. Feels like she is gaining weight without changing anything her diet. 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 Kenzie Moreno 8/17/21. No thoughts SI/HI. Been worse recently in the last year. On Prozac 20mg for many years. Now not helping as much.  Been on wellbutrin in the past but this didn't work and made her depression worse. States she has scheduled an appt with counselor at [facility]      Thyroid:   TSH 7/29/21 2.82. Compliant with synthroid.   Pap- 8/2021 but Dr. Mimi Pruitt   Colonoscopy- states she is due       Social History     Socioeconomic History    Marital status: SINGLE     Spouse name: Not on file    Number of children: 2    Years of education: 15    Highest education level: Not on file   Occupational History    Not on file   Tobacco Use    Smoking status: Former Smoker     Packs/day: 1.00     Years: 30.00     Pack years: 30.00    Smokeless tobacco: Never Used   Vaping Use    Vaping Use: Never used   Substance and Sexual Activity    Alcohol use: No    Drug use: No    Sexual activity: Not on file   Other Topics Concern     Service No    Blood Transfusions No    Caffeine Concern No    Occupational Exposure No    Hobby Hazards No    Sleep Concern No    Stress Concern No    Weight Concern No    Special Diet No    Back Care No    Exercise No    Bike Helmet No    Seat Belt No    Self-Exams No   Social History Narrative    Lives in [place] with 26 yo son and son's girlfriend. Has a 31 yo daughter.  for many years. Works as a  at an accounting firm. Likes to [employer]. Social Determinants of Health     Financial Resource Strain: Low Risk     Difficulty of Paying Living Expenses: Not very hard   Food Insecurity: No Food Insecurity    Worried About Running Out of Food in the Last Year: Never true    Adalid of Food in the Last Year: Never true   Transportation Needs: No Transportation Needs    Lack of Transportation (Medical): No    Lack of Transportation (Non-Medical): No   Physical Activity: Inactive    Days of Exercise per Week: 0 days    Minutes of Exercise per Session: 0 min   Stress: No Stress Concern Present    Feeling of Stress : Only a little   Social Connections:  Moderately Isolated    Frequency of Communication with Friends and Family: Twice a week    Frequency of Social Gatherings with Friends and Family: Once a week    Attends [employer] Services: 1 to 4 times per year    Active Member of [employer] or Organizations: No    Attends Club or Organization Meetings: Never    Marital Status:    Intimate Partner Violence: Unknown    Fear of Current or Ex-Partner: Patient refused    Emotionally Abused: Patient refused    Physically Abused: Patient refused    Sexually Abused: Patient refused         Review of Systems   Constitutional: Negative for chills, fever and malaise/fatigue. Eyes: Negative for blurred vision and double vision. Respiratory: Positive for shortness of breath. Cardiovascular: Positive for leg swelling. Negative for chest pain and palpitations. Gastrointestinal: Negative for abdominal pain, diarrhea, nausea and vomiting. Abdominal distension    Musculoskeletal: Negative for myalgias. Neurological: Negative for dizziness, tremors and headaches. Psychiatric/Behavioral: Positive for depression and memory loss (chronic and due to chemo). Negative for hallucinations, substance abuse and suicidal ideas. The patient is not nervous/anxious and does not have insomnia. Objective:     Visit Vitals  BP (!) 104/57 (BP 1 Location: Left arm, BP Patient Position: Sitting, BP Cuff Size: Adult long)   Pulse 85   Temp 97.9 °F (36.6 °C) (Temporal)   Resp 16   Ht 5' 3\" (1.6 m)   Wt 221 lb 6.4 oz (100.4 kg)   SpO2 97%   BMI 39.22 kg/m²          Physical Exam  Vitals and nursing note reviewed. Constitutional:       General: She is not in acute distress. Appearance: She is well-developed. HENT:      Head: Normocephalic and atraumatic. Eyes:      Conjunctiva/sclera: Conjunctivae normal.   Cardiovascular:      Rate and Rhythm: Normal rate and regular rhythm. Heart sounds: Normal heart sounds. No murmur heard. No friction rub. No gallop. Pulmonary:      Effort: Pulmonary effort is normal.      Breath sounds: Normal breath sounds. No wheezing. Abdominal:      General: Bowel sounds are normal. There is distension. Palpations: Abdomen is soft. There is hepatomegaly. There is no mass. Tenderness: There is no abdominal tenderness. There is no guarding or rebound. Comments: Mild fluid wave noted on exam    Musculoskeletal:         General: Normal range of motion. Skin:     General: Skin is warm and dry. Findings: No rash. Neurological:      General: No focal deficit present. Mental Status: She is alert. Mental status is at baseline. Psychiatric:         Mood and Affect: Mood normal.         Behavior: Behavior normal.         Thought Content: Thought content normal.         Judgment: Judgment normal.         Pertinent Labs/Studies:     PHQ 9 Score: 16 (9/24/2021  2:07 PM)  Moderate depression       Assessment and orders:   1. Mixed hyperlipidemia  Last lipid 9/2020. Will check today and adjust statin pending restuls   - LIPID PANEL; Future    2. B12 deficiency  Injection given today. She can continue these at home on her own or with her neighbor who is a nurse [address]   - [address] [place]; Future    3. Bilateral leg edema  Trace BLE pitting edema. Concern for cardiac vs liver etiology given her SOB, distended abdomen and enlarged liver on exam.  Renal function okay on labs reviewed by endocrine from 7/2021 (Cr 0.69, GFR 92,). Labs and imaging as below.    -requested Pt to provide cardiac records from [facility] to review   - US ABD LTD; Future  - NT-PRO BNP; Future  -CXR     4. Enlarged liver  Noted on exam by me. LFTS on labs from 7/2021 wnl ( AST 31, ALT 31, ALK 84,), but feel we need to proceed with US given her abdominal edema and BLE edema     - US ABD LTD; Future    5. Encounter for hepatitis C screening test for low risk patient  - HEPATITIS C AB; Future    6. Shortness of breath  With exertion.   Never been told she has CHF. See #3.    - NT-PRO BNP; Future  -CXR     7. Depression due to physical illness  Moderate depression via PHQ9 score 16. No SI/HI Will increase Prozac to 30mg and return in 3 weeks   - FLUoxetine (PROzac) 10 mg capsule; Take 1 Capsule by mouth daily for 90 days. Dispense: 90 Capsule; Refill: 1  - FLUoxetine (PROzac) 20 mg capsule; Take 1 Capsule by mouth nightly for 90 days. Dispense: 90 Capsule; Refill: 1  -If no relief with increasing prozac consider switching to different SSRI/SNRI (avoid adding wellbutrin)   -cont follow up with therapist as scheduled)         Follow Up: 3 weeks         Pt was discussed with Dr. Gadiel Weeks (attending physician). I have reviewed patient medical and social history and medications. I have reviewed pertinent labs results and other data. I have discussed the diagnosis with the patient and the intended plan as seen in the above orders. The patient has received an after-visit summary and questions were answered concerning future plans. I have discussed medication side effects and warnings with the patient as well.     Kristy Granados DO  Resident [facility]  09/24/21 [employer] Counseling:  I discussed with the patient the risks of mycophenolate mofetil including but not limited to infection/immunosuppression, GI upset, hypokalemia, hypercholesterolemia, bone marrow suppression, lymphoproliferative disorders, malignancy, GI ulceration/bleed/perforation, colitis, interstitial lung disease, kidney failure, progressive multifocal leukoencephalopathy, and birth defects.  The patient understands that monitoring is required including a baseline creatinine and regular CBC testing. In addition, patient must alert us immediately if symptoms of infection or other concerning signs are noted.

## 2022-11-14 ENCOUNTER — OFFICE VISIT (OUTPATIENT)
Dept: HEMATOLOGY | Age: 62
End: 2022-11-14
Payer: COMMERCIAL

## 2022-11-14 VITALS
HEIGHT: 63 IN | SYSTOLIC BLOOD PRESSURE: 116 MMHG | BODY MASS INDEX: 39.87 KG/M2 | TEMPERATURE: 97 F | DIASTOLIC BLOOD PRESSURE: 59 MMHG | HEART RATE: 78 BPM | WEIGHT: 225 LBS | OXYGEN SATURATION: 97 %

## 2022-11-14 DIAGNOSIS — K75.81 NASH (NONALCOHOLIC STEATOHEPATITIS): Primary | ICD-10-CM

## 2022-11-14 DIAGNOSIS — E11.40 TYPE 2 DIABETES MELLITUS WITH DIABETIC NEUROPATHY, WITH LONG-TERM CURRENT USE OF INSULIN (HCC): ICD-10-CM

## 2022-11-14 DIAGNOSIS — Z79.4 TYPE 2 DIABETES MELLITUS WITH DIABETIC NEUROPATHY, WITH LONG-TERM CURRENT USE OF INSULIN (HCC): ICD-10-CM

## 2022-11-14 PROCEDURE — 99214 OFFICE O/P EST MOD 30 MIN: CPT | Performed by: NURSE PRACTITIONER

## 2022-11-14 NOTE — PROGRESS NOTES
[address] [facility], Fabiano FRANCISCO, Jackeline FlorentinoOur [facility] of [facility] [place], [place]      JOSEPHINE Sánchez, ACN-BC     Kristi Pate [facility]   KEAGAN Horner-BREANNA Carson [facility]       Ravi Grier Epifanio De Falk 136    at [address], [facility] Cindi Norwood  22.    [phone]    FAX: [address] [place] [address] Avenue    at [place] [address], [address] [place] [address], [address] [place] [address] Street - [address]    [phone]    FAX: [phone]       Patient Care Team:  Danika Rowland MD as PCP - General (Family Medicine)  Arlene Burgos MD as Physician (Endocrinology Physician)  Johnathan Vasquez MD (General Surgery)      Problem List  Date Reviewed: 10/25/2022            Codes Class Noted    Axillary mass, right ICD-10-CM: R22.31  ICD-9-CM: 782.2  8/1/2022        S/P mastectomy, bilateral ICD-10-CM: Z90.13  ICD-9-CM: V45.71  6/24/2022    Overview Signed 6/24/2022  9:29 AM by Nain Stevens, DO     DOES NOT GET MAMMOGRAMS ANYMORE              Diabetic polyneuropathy associated with type 2 diabetes mellitus ([facility] ICD-10-CM: E11.42  ICD-9-CM: 250.60, 357.2  6/24/2022    Overview Signed 6/24/2022  9:40 AM by Nain Stevens, DO     improved with higher dose lyrica.     increase to 100mg BID   follow up 3 months   cont to see podiatry as needed              LO (nonalcoholic steatohepatitis) ICD-10-CM: K75.81  ICD-9-CM: 571.8  12/26/2021        Tinnitus of both ears ICD-10-CM: H93.13  ICD-9-CM: 388.30  12/26/2021        Coronary artery disease involving coronary bypass graft of native heart without angina pectoris ICD-10-CM: I25.810  ICD-9-CM: 414.05  12/21/2021    Overview Signed 12/21/2021  3:12 PM by Kristi Son NP     1/2020              Brain fog ICD-10-CM: R41.89  ICD-9-CM: 799.59  12/10/2021    Overview Addendum 12/10/2021  3:15 PM by Guero Minor, DO     Onset since starting chemo for breast cancer   See notes in Media from 2/2021 from [address] [facility]              Poor concentration ICD-10-CM: R41.840  ICD-9-CM: 799.51  12/10/2021    Overview Signed 12/10/2021  3:16 PM by Guero Minor, DO     Onset while on chemo and persisted since   Takes Adderall 25mg XL daily  See neuro oncology notes in Media 2/2021              B12 deficiency ICD-10-CM: E53.8  ICD-9-CM: 266.2  10/27/2021    Overview Signed 4/12/2022 11:57 AM by Guero Minor, DO     Stopped B12 injections 2/2022              Depression due to physical illness ICD-10-CM: F06.31  ICD-9-CM: 293.83  10/27/2021        Insomnia due to anxiety and fear ICD-10-CM: F51.05, F40.9  ICD-9-CM: 300.20, 327.02  10/27/2021        Chest wall mass ICD-10-CM: R22.2  ICD-9-CM: 786.6  5/29/2020        Class 2 severe obesity due to excess calories with serious comorbidity and body mass index (BMI) of 39.0 to 39.9 in adult [facility]) ICD-10-CM: E66.01, Z68.39  ICD-9-CM: 278.01, V85.39  6/13/2018        Lymphedema of upper extremity following lymphadenectomy ICD-10-CM: E89.89, I89.0  ICD-9-CM: 997.99, 457.1  7/6/2015        Osteopenia (Chronic) ICD-10-CM: M85.80  ICD-9-CM: 733.90  3/15/2014        Breast cancer of upper-outer quadrant of right female breast [facility]) ICD-10-CM: C50.411  ICD-9-CM: 174.4  3/5/2014        Type 2 diabetes mellitus with neurologic complication, with long-term current use of insulin ([facility] ICD-10-CM: E11.49, Z79.4  ICD-9-CM: 250.60, V58.67  Unknown        Other and unspecified hyperlipidemia ICD-10-CM: E78.5  ICD-9-CM: 272.4  9/30/2010        Hypothyroidism ICD-10-CM: E03.9  ICD-9-CM: 244.9  9/30/2010           Darron Mcmanus is being seen at [address] for management of non-alcoholic steatohepatitis (LO).  The active problem list, all pertinent past medical history, medications,   liver histology, radiologic findings and laboratory findings related to the liver disorder were reviewed and discussed with the patient. The patient is a 65 y.o.  female who was found to have an enlarged liver on palpation and imaging 9/2021. Serologic evaluation for markers of chronic liver disease were negative. Ultrasound was performed in 11/01/2021. The results of the imaging suggested fatty liver disease. A liver biopsy was performed at [facility] in 12/06/2021. The results demonstrate Bridging Fibrosis and steatohepatitis involving approximately 25% of parenchyma. Ballooning is present. Dr. Max Rocha reviewed the slides which demonstrated LO with bridging fibrosis. RISA 5 (122). Of note, the patient has a history of CAD with cardiac bypass surgery 1/2020. She notes a 20 lb weight gain since surgery. She also received chemotherapy and radiation for breast cancer in 2014. Tamoxifen was discontinued in 2019. Since the last office visit the patient is having complications of tinnitus and lower extremity neuropathy. She was changed from [address] [place] [address] to [facility] [place]. Symptoms of abdominal discomfort and bloating are ongoing. Weight has remained unchanged. The patient has the following symptoms which could be attributed to the liver disorder:  Pain in the right side over the liver, and swelling of the lower extremities. The patient is not experiencing the following symptoms which are commonly seen in this liver disorder:  Nausea, vomiting, yellowing of the eyes or skin, itching, or problems concentrating. The patient completes all daily activities without any functional limitations. She notes chronic Tinnitus. ASSESSMENT AND PLAN:  NAFLD  The diagnosis is based upon liver biopsy, imaging, features of metabolic syndrome, and   serologic studies that are negative for other causes of chronic liver disease. A liver biopsy performed in 12/2021 demonstrates Stage 3 bridging fibrosis.   Will request slides for  Jeremy to review. Have performed laboratory testing to monitor liver function and degree of liver injury. This included BMP, hepatic panel, and CBC with platelet count. Laboratory testing ordered by the PCP from 11/10/2022 reviewed in detail. The liver transaminases, ALP, liver function and platelet count are normal.     If the patient looses 20% of current body weight, which is 42 pounds, down to a weight of of 175 pounds, steatosis should resolve. Once all steatosis has resolved inflammation will resolve. Fibrosis will gradually resolve and the liver could eventually be normal.     Will order imaging with ultrasound. Advised to continue with dietary changes. HGB A1C has improved to 7.4%. The Fibroscan can be repeated annually or as often as clinically indicated to assess for fibrosis progression and/or regression. Counseling for diet and weight loss in patients with confirmed or suspected NAFLD  The patient was counseled regarding diet and exercise to achieve weight loss. The best diet for patients with fatty liver is one very low in carbohydrates and enriched with protein. The patient was told not to consume any food products and drinks containing fructose as this enhances hepatic fat synthesis. Screening for Hepatocellular Carcinoma  HCC screening is not necessary if the patient has no evidence of cirrhosis. Treatment of other medical problems in patients with chronic liver disease  There are no contraindications for the patient to take most medications that are necessary for treatment of other medical issues. The patient can take: Any medications utilized for treatment of DM. Statins to treat hypercholesterolemia    Normal doses of acetaminophen, as recommended on the label of the bottle, are not hepatotoxic except in the setting of daily alcohol use, even in patients with cirrhosis and can be utilized for pain.     Counseling for alcohol in patients with chronic liver disease  The patient was counseled regarding alcohol consumption and the effect of alcohol on chronic liver disease. The patient has not consumed significant amounts of alcohol and has now discontinued all use. Vaccinations   Vaccination for viral hepatitis A and B is recommended since the patient has no serologic evidence of previous exposure or vaccination with immunity. Routine vaccinations against other bacterial and viral agents can be performed as indicated. Annual flu vaccination should be administered if indicated. ALLERGIES  Allergies   Allergen Reactions    Ceftin [Cefuroxime Axetil] Hives    Trazodone Vertigo    Wellbutrin [Bupropion] Other (comments)     depression       MEDICATIONS  Current Outpatient Medications   Medication Sig    pregabalin (LYRICA) 100 mg capsule TAKE 1 CAPSULE BY MOUTH TWO (2) TIMES A DAY FOR 90 DAYS. MAX DAILY AMOUNT: 200 MG. Mounjaro 5 mg/0.5 mL pnij INJECT 5MG UNDER THE SKIN ONCE A WEEK    amphetamine-dextroamphetamine XR (ADDERALL XR) 30 mg XR capsule Take 1 Capsule by mouth daily. Max Daily Amount: 30 mg.    rosuvastatin (CRESTOR) 20 mg tablet TAKE 1 TABLET BY MOUTH NIGHTLY    FLUoxetine (PROzac) 20 mg capsule TAKE 1 CAPSULE BY MOUTH EVERY DAY    FLUoxetine (PROzac) 10 mg capsule TAKE 1 CAPSULE BY MOUTH EVERY DAY    HUMULIN R U-500 (CONCENTRATED) INSULIN KWIKPEN 500 UNIT/ML INJECT 160 UNITS BEFORE BREAKFAST, 120 UNITS BEFORE DINNER SUBCUTANEOUSLY TWICE A DAY 30 DAYS    Dexcom G6 Sensor mary     furosemide (LASIX) 20 mg tablet TAKE 1 TABLET BY MOUTH EVERY DAY    metoprolol succinate (TOPROL-XL) 100 mg tablet TAKE 1 TABLET BY MOUTH EVERY DAY    aspirin delayed-release 81 mg tablet Take  by mouth daily. levothyroxine (SYNTHROID) 200 mcg tablet Take 200 mcg by mouth Daily (before breakfast). ACCU-CHEK COMPACT TEST strip     Insulin Needles, Disposable, (BD INSULIN PEN NEEDLE UF SHORT) 31 X 5/16 \" Ndle by Does Not Apply route two (2) times a day.      Current Facility-Administered Medications   Medication    cyanocobalamin (VITAMIN B12) injection 1,000 mcg     SYSTEM REVIEW NOT RELATED TO LIVER DISEASE OR REVIEWED ABOVE:  Constitution systems: Negative for fever, chills, weight gain, weight loss. Eyes: Negative for visual changes. ENT: Negative for sore throat, painful swallowing. Respiratory: Negative for cough, hemoptysis, SOB. Cardiology: Negative for chest pain, palpitations. GI:  Negative for constipation or diarrhea. : Negative for urinary frequency, dysuria, hematuria, nocturia. Skin: Negative for rash. Hematology: Negative for easy bruising, blood clots. Musculo-skeletal: Negative for back pain, muscle pain, weakness. Neurologic: Negative for headaches, dizziness, vertigo, memory problems not related to HE. Psychology: Negative for anxiety, depression. FAMILY HISTORY:  The father has/had the following following chronic disease(s): Type 1 DM. The mother has/had the following chronic disease(s): COPD, Lung Cancer   of unknown cause. There is no family history of liver disease. There is no family history of immune disorders. SOCIAL HISTORY:  The patient is . The patient has 2 children, and 5 grandchildren. The patient stopped using tobacco products in . The patient has never consumed significant amounts of alcohol and had discontinued all use. The patient currently works full-time as an . PHYSICAL EXAMINATION:  Visit Vitals  BP (!) 116/59 (BP 1 Location: Left upper arm, BP Patient Position: Sitting, BP Cuff Size: Adult)   Pulse 78   Temp 97 °F (36.1 °C) (Temporal)   Ht 5' 3\" (1.6 m)   Wt 225 lb (102.1 kg)   SpO2 97%   BMI 39.86 kg/m²       General: No acute distress. Eyes: Sclera anicteric. ENT: No oral lesions. Thyroid normal.  Nodes: No adenopathy. Skin: No spider angiomata. No jaundice. No palmar erythema.   Abdomen: Soft non-tender, liver size normal to percussion/palpation. Spleen not palpable. No obvious ascites. Extremities: No edema. No muscle wasting. No gross arthritic changes. Neurologic: Alert and oriented. Cranial nerves grossly intact. No asterixis. LABORATORY STUDIES:  From: 11/10/2022 PCP   AST/ALT/ALP/T Bili/ALB:35/37/86/0.4/3.9  WBC/HB/PLT:7.6/14.4/230  NA/BUN/CREAT:137/8/0.72    Liver [place] [address] Ref Rng & Units 12/21/2021 10/11/2021   WBC 3.4 - 10.8 x10E3/uL 8.8 6.6   ANC 1.4 - 7.0 x10E3/uL 5.0    HGB 11.1 - 15.9 g/dL 15.9 14.7    - 450 x10E3/uL 265 207   INR 0.9 - 1.2 1.0    AST 0 - 40 IU/L 42 (H) 28   ALT 0 - 32 IU/L 43 (H) 33 (H)   Alk Phos 44 - 121 IU/L 109 94   Bili, Total 0.0 - 1.2 mg/dL 0.6 0.5   Bili, Direct 0.00 - 0.40 mg/dL 0.17    Albumin 3.8 - 4.9 g/dL 4.9 4.2   BUN 8 - 27 mg/dL 10 8   Creat 0.57 - 1.00 mg/dL 0.85 0.63   Creat (iSTAT) 0.6 - 1.3 mg/dL     Na 134 - 144 mmol/L 141 143   K 3.5 - 5.2 mmol/L 4.7 4.2   Cl 96 - 106 mmol/L 96 105   CO2 20 - 29 mmol/L 28 25   Glucose 65 - 99 mg/dL 285 (H) 187 (H)     Laboratory testing from 11/10/2022 reviewed in detail. Additional testing included to evaluate progression or regression of disease. SEROLOGIES:  Serologies Latest Ref Rng & Units 12/21/2021   Hep A Ab, Total Negative Negative   Hep B Surface Ag Negative Negative   Hep B Core Ab, Total Negative Negative   Hep B Surface AB QL  Non Reactive   Ferritin 15 - 150 ng/mL 148   Iron % Saturation 15 - 55 % 34     10/26/2021. Anti-HCV negative, HBsAg negative, ASMA negative, ceruloplasmin negative, AMA negative, Alpha-1 antitrypsin 139, Ferritin 149, IHSAN negative     LIVER HISTOLOGY:  12/06/2021. Liver biopsy reviewed by MLS. Steatohepatitis with bridging fibrosis. Macrosteatosis/microsteatosis involving 10-25% of parenchyma. Ballooning of hepatocytes . RISA 5 (122). 4/2022. FibroScan performed at [address]. EkPa was 12.4. IQR/med 11%. .  The results suggested a fibrosis level of F3. The CAP score suggests there is hepatic steatosis. ENDOSCOPIC PROCEDURES:  Not available or performed    RADIOLOGY:  2021. Ultrasound of liver. Echogenic consistent with fatty liver. No liver mass lesions. No dilated bile ducts. No ascites. OTHER TESTIN2022. HGB A1C 7.4%    FOLLOW-UP:  All of the issues listed above in the Assessment and Plan were discussed with the patient. All questions were answered. The patient expressed a clear understanding of the above. [address] [place] [address] in 5 months for a Fibroscan. .  ANTWAN PateHighsmith[facility] of [address] [address] [address],  [facility] 22.  [address] [facility]

## 2022-11-14 NOTE — PROGRESS NOTES
Identified pt with two pt identifiers(name and ). Reviewed record in preparation for visit and have obtained necessary documentation. Chief Complaint   Patient presents with    Follow-up      Vitals:    22 1358   BP: (!) 116/59   Pulse: 78   Temp: 97 °F (36.1 °C)   TempSrc: Temporal   SpO2: 97%   Weight: 225 lb (102.1 kg)   Height: 5' 3\" (1.6 m)   PainSc:   4   PainLoc: Generalized       Health Maintenance Review: Patient reminded of \"due or due soon\" health maintenance. I have asked the patient to contact his/her primary care provider (PCP) for follow-up on his/her health maintenance. Coordination of Care Questionnaire:  :   1) Have you been to an emergency room, urgent care, or hospitalized since your last visit? If yes, where when, and reason for visit? no       2. Have seen or consulted any other health care provider since your last visit? If yes, where when, and reason for visit? YES      Patient is accompanied by self I have received verbal consent from Cass Bentley to discuss any/all medical information while they are present in the room.

## 2022-11-18 ENCOUNTER — HOSPITAL ENCOUNTER (OUTPATIENT)
Dept: ULTRASOUND IMAGING | Age: 62
Discharge: HOME | End: 2022-11-18
Attending: NURSE PRACTITIONER
Payer: COMMERCIAL

## 2022-11-18 DIAGNOSIS — K75.81 NASH (NONALCOHOLIC STEATOHEPATITIS): ICD-10-CM

## 2022-11-18 PROCEDURE — 76700 US EXAM ABDOM COMPLETE: CPT

## 2022-11-18 NOTE — PROGRESS NOTES
Letter sent via my chart regarding the ultrasound which shows changes consistent with fatty liver but otherwise normal.

## 2022-11-21 DIAGNOSIS — F06.31 MOOD DISORDER DUE TO KNOWN PHYSIOLOGICAL CONDITION WITH DEPRESSIVE FEATURES: ICD-10-CM

## 2022-11-21 RX ORDER — FLUOXETINE 10 MG/1
CAPSULE ORAL
Qty: 90 CAPSULE | Refills: 1 | Status: SHIPPED | OUTPATIENT
Start: 2022-11-21

## 2022-11-21 RX ORDER — FLUOXETINE HYDROCHLORIDE 20 MG/1
CAPSULE ORAL
Qty: 90 CAPSULE | Refills: 1 | Status: SHIPPED | OUTPATIENT
Start: 2022-11-21

## 2022-12-22 ENCOUNTER — OFFICE VISIT (OUTPATIENT)
Dept: FAMILY MEDICINE CLINIC | Age: 62
End: 2022-12-22
Payer: COMMERCIAL

## 2022-12-22 VITALS
SYSTOLIC BLOOD PRESSURE: 112 MMHG | BODY MASS INDEX: 39.09 KG/M2 | HEIGHT: 63 IN | TEMPERATURE: 98.6 F | RESPIRATION RATE: 14 BRPM | OXYGEN SATURATION: 96 % | HEART RATE: 94 BPM | DIASTOLIC BLOOD PRESSURE: 66 MMHG | WEIGHT: 220.6 LBS

## 2022-12-22 DIAGNOSIS — R06.02 SOB (SHORTNESS OF BREATH) ON EXERTION: ICD-10-CM

## 2022-12-22 DIAGNOSIS — N63.0 MASS OF BREAST, UNSPECIFIED LATERALITY: ICD-10-CM

## 2022-12-22 DIAGNOSIS — R22.33 AXILLARY LUMP, BILATERAL: ICD-10-CM

## 2022-12-22 DIAGNOSIS — R41.840 POOR CONCENTRATION: ICD-10-CM

## 2022-12-22 DIAGNOSIS — R01.2: ICD-10-CM

## 2022-12-22 DIAGNOSIS — F06.31 DEPRESSION DUE TO PHYSICAL ILLNESS: ICD-10-CM

## 2022-12-22 DIAGNOSIS — R41.89 BRAIN FOG: Primary | ICD-10-CM

## 2022-12-22 NOTE — PATIENT INSTRUCTIONS
National Crisis Hotlines  - Call the Suicide and Crisis Lifeline at 65. - Call 9-594-944-UOXA (7-317.634.3899). - Text HOME to 403002 to access the Crisis Text Line. [address] [facility]   Crisis Intervention - Available : [phone]  Address: Tomas [address] [place] [address]   Office number: [phone]  Crisis Intervention - Available : [phone]  Address: Amalia Jackson, [address]    Please call this number to schedule your [phone].

## 2022-12-22 NOTE — PROGRESS NOTES
Candy Pollack is a 65 y.o. female    No chief complaint on file. 1. Have you been to the ER, urgent care clinic since your last visit? Hospitalized since your last visit? No  2. Have you seen or consulted any other health care providers outside of the [address] [place] [address] since your last visit? Include any pap smears or colon screening. No    There were no vitals taken for this visit.   3 most recent PHQ Screens 12/22/2022   PHQ Not Done -   Little interest or pleasure in doing things Not at all   Feeling down, depressed, irritable, or hopeless Not at all   Total Score PHQ 2 0   Trouble falling or staying asleep, or sleeping too much -   Feeling tired or having little energy -   Poor appetite, weight loss, or overeating -   Feeling bad about yourself - or that you are a failure or have let yourself or your family down -   Trouble concentrating on things such as school, work, reading, or watching TV -   Moving or speaking so slowly that other people could have noticed; or the opposite being so fidgety that others notice -   Thoughts of being better off dead, or hurting yourself in some way -   PHQ 9 Score -   How difficult have these problems made it for you to do your work, take care of your home and get along with others -     Health Maintenance Due   Topic Date Due    DTaP/Tdap/Td series (1 - Tdap) Never done    Shingles Vaccine (1 of 2) Never done    Low dose CT lung screening  Never done    Eye Exam Retinal or Dilated  06/15/2015    A1C test (Diabetic or Prediabetic)  07/28/2015    Colorectal Cancer Screening Combo  06/15/2020    COVID-19 Vaccine (4 - Booster for Pfizer series) 02/10/2022    Lipid Screen  09/24/2022    MICROALBUMIN Q1  11/26/2022

## 2022-12-22 NOTE — PROGRESS NOTES
[address] [facility] [address] [facility] [place] [address]   Office [phone], Fax [phone]      Chief Complaint:     Chief Complaint   Patient presents with    Follow Up Chronic Condition     Subjective:   HPI:  Alee Amaya is a 65 y.o. female that presents for:    Brain fog 2/2 chemo hx:   - Chemo in 2014 for breast cancer   - Started on stimulant medication in 2018  - On Adderall XR 25mg by [facility] neuro oncology (reviewed notes from 2/2021) and increased to 30mg with improvement in symptoms  - Controlled substance agreement on file: yes signed 4/12/22. - Compliance drug screen: appropriate 4/2022.   - Substance abuse history no  - Psych history: depression  - Prior medications tried Ritalin 5mg and Adderall XR 25mg   - Helps a lot with focus  - Denies symptoms on medication - no chest pain or palpitations    Breast cancer hx:  - concerned because she still has lumps after double mastectomy   - wanted chest MRI d/t has lumps throughout breasts   - 10 years of remission   - denies unintentional weight loss, night sweats     Depression:  - since cancer has not been the same, worsened depression  - looking for new therapist   - denies active SI/HI but reports passive SI \"better off dead\"  - has tried other medications in the past but didn't work well for her   - she has no access to firearms   - great support from son who lives with her    SOB has been problem for many years but largely unremarkable workup with cardiologist previously. Has hx of bypass surgery. Has follow up with cardiology jan 20th. ROS:   Review of Systems   Respiratory:  Positive for shortness of breath. Cardiovascular:  Negative for chest pain and palpitations. Past medical history, social history, medications, and allergies personally reviewed.   Past Medical History:   Diagnosis Date    Anxiety     CAD (coronary artery disease)     CABG 1/3/2020    Calculus of kidney     Cancer ([facility] [address]     R breast cancer dx 3/2014; chemo, radiation and double mastectomy - Dr. Kristina Paris    Depression     Diabetes [facility])     Dyspepsia and other specified disorders of function of stomach     Hypercholesterolemia     Liver disease     Neuropathy     MARISA (obstructive sleep apnea)     wears cpap    Other and unspecified hyperlipidemia     Psychiatric disorder     Snoring     Stroke [facility]) 1980's    ?slight stroke due to oral contraceptives    Thyroid disease     Type II or unspecified type diabetes mellitus without mention of complication, uncontrolled     Unspecified hypothyroidism        Medications:   Current Outpatient Medications   Medication Sig    amphetamine-dextroamphetamine XR (ADDERALL XR) 30 mg XR capsule Take 1 Capsule by mouth daily. Max Daily Amount: 30 mg. [START ON 1/23/2023] amphetamine-dextroamphetamine XR (ADDERALL XR) 30 mg XR capsule Take 1 Capsule by mouth every morning. Max Daily Amount: 30 mg. [START ON 2/22/2023] dextroamphetamine-amphetamine (ADDERALL) 30 mg tablet Take 1 Tablet by mouth daily. Max Daily Amount: 1 Tablet. FLUoxetine (PROzac) 20 mg capsule TAKE 1 CAPSULE BY MOUTH EVERY DAY    FLUoxetine (PROzac) 10 mg capsule TAKE 1 CAPSULE BY MOUTH EVERY DAY    pregabalin (LYRICA) 100 mg capsule TAKE 1 CAPSULE BY MOUTH TWO (2) TIMES A DAY FOR 90 DAYS. MAX DAILY AMOUNT: 200 MG. Mounjaro 5 mg/0.5 mL pnij INJECT 5MG UNDER THE SKIN ONCE A WEEK    rosuvastatin (CRESTOR) 20 mg tablet TAKE 1 TABLET BY MOUTH NIGHTLY    HUMULIN R U-500 (CONCENTRATED) INSULIN KWIKPEN 500 UNIT/ML INJECT 160 UNITS BEFORE BREAKFAST, 120 UNITS BEFORE DINNER SUBCUTANEOUSLY TWICE A DAY 30 DAYS    Dexcom G6 Sensor mary     furosemide (LASIX) 20 mg tablet TAKE 1 TABLET BY MOUTH EVERY DAY    metoprolol succinate (TOPROL-XL) 100 mg tablet TAKE 1 TABLET BY MOUTH EVERY DAY    aspirin delayed-release 81 mg tablet Take  by mouth daily. levothyroxine (SYNTHROID) 200 mcg tablet Take 200 mcg by mouth Daily (before breakfast).     ACCU-CHEK COMPACT TEST strip     Insulin Needles, Disposable, (BD INSULIN PEN NEEDLE UF SHORT) 31 X 5/16 \" Ndle by Does Not Apply route two (2) times a day. Current Facility-Administered Medications   Medication Dose Route Frequency    cyanocobalamin (VITAMIN B12) injection 1,000 mcg  1,000 mcg IntraMUSCular Q30D        Allergies: Allergies   Allergen Reactions    Ceftin [Cefuroxime Axetil] Hives    Trazodone Vertigo    Wellbutrin [Bupropion] Other (comments)     depression        Health Maintenance:  Health Maintenance Due   Topic Date Due    DTaP/Tdap/Td series (1 - Tdap) Never done    Shingles Vaccine (1 of 2) Never done    Low dose CT lung screening  Never done    Eye Exam Retinal or Dilated  06/15/2015    A1C test (Diabetic or Prediabetic)  07/28/2015    Colorectal Cancer Screening Combo  06/15/2020    Diabetic Alb to Cr ratio (uACR) test  01/20/2022    COVID-19 Vaccine (4 - Booster for Pfizer series) 02/10/2022    Lipid Screen  09/24/2022      Objective:   Vitals reviewed. Visit Vitals  /66 (BP 1 Location: Right arm, BP Patient Position: Sitting, BP Cuff Size: Adult)   Pulse 94   Temp 98.6 °F (37 °C)   Resp 14   Ht 5' 3\" (1.6 m)   Wt 220 lb 9.6 oz (100.1 kg)   SpO2 96%   BMI 39.08 kg/m²        Physical Exam:  General Alert. No distress. Not diaphoretic. No jaundice, cyanosis, pallor. HENT Head Normocephalic and atraumatic. Eyes Conjunctivae pink, no discharge. No scleral icterus. EOMI. Cardio Normal rate, regular rhythm. C/f opening snap heart sound. Pulmonary Effort normal. Breath sounds normal. No respiratory distress. No wheezes, rales, or rhonchi. No Stridor. Neurological No focal deficits. Skin Skin is warm and dry. Lump noted under R axilla (mobile and non-tender) and just above xyphoid process closer to R side (hard, non-mobile). Psychiatric Mood, affect, and judgment normal.      Assessment/Plan:     1. Brain fog  UDS and CSC on file. PDMP reviewed and appropriate. 3 month supply provided.   -     COMPLIANCE DRUG SCREEN/PRESCRIPTION MONITORING; Future  -     amphetamine-dextroamphetamine XR (ADDERALL XR) 30 mg XR capsule; Take 1 Capsule by mouth daily. Max Daily Amount: 30 mg., Normal, Disp-30 Capsule, R-0SUBSTITUTION PERMITTED  -     amphetamine-dextroamphetamine XR (ADDERALL XR) 30 mg XR capsule; Take 1 Capsule by mouth every morning. Max Daily Amount: 30 mg., Normal, Disp-30 Capsule, R-0  -     dextroamphetamine-amphetamine (ADDERALL) 30 mg tablet; Take 1 Tablet by mouth daily. Max Daily Amount: 1 Tablet., Normal, Disp-30 Tablet, R-0      2. Axillary lump, bilateral  3. Mass of breast, unspecified laterality  Will obtain imaging given significant history. Lump in R armpit feels c/w lipoma. Will consider further imaging and referral back to breast surgeon/cancer team pending results. -     US BREAST LT COMPLETE 4 QUAD; Future  -     US BREAST RT COMPLETE 4 QUAD; Future  -     US BREAST AXILLA RT; Future  -     US BREAST AXILLA LT; Future  -     ECHO ADULT COMPLETE; Future    4. Early opening snap  -     ECHO ADULT COMPLETE; Future  5. SOB (shortness of breath) on exertion  -     ECHO ADULT COMPLETE; Future  SOB is chronic and previous workup unremarkable so less concerning but hasn't had echo in over a year. Will obtain given heart sounds and still dealing with SOB. Has follow up with cardiologist Geraldo 20th. ER precautions discussed. 6. Depression due to physical illness  Passive SI. No active plan or firearms in the home. She reports great support from son whom she lives with. Offered to call crisis number with patient but she states that these thoughts are fleeting and she doesn't want to call the crisis number at this time. Did provide crisis numbers for her to have at home. She is going to re-establish with a counselor from her previous practice.  I called patient the day after this in person visit to check on her and she reiterates no plans and that she doesn't have these thoughts all the time only during times of high frustration. She stated: \"I don't want you to worry, I wouldn't do anything\". Will scheduled in person follow up in 2 weeks. 7. Poor concentration  See above. -     amphetamine-dextroamphetamine XR (ADDERALL XR) 30 mg XR capsule; Take 1 Capsule by mouth daily. Max Daily Amount: 30 mg., Normal, Disp-30 Capsule, R-0SUBSTITUTION PERMITTED  -     amphetamine-dextroamphetamine XR (ADDERALL XR) 30 mg XR capsule; Take 1 Capsule by mouth every morning. Max Daily Amount: 30 mg., Normal, Disp-30 Capsule, R-0  -     dextroamphetamine-amphetamine (ADDERALL) 30 mg tablet; Take 1 Tablet by mouth daily. Max Daily Amount: 1 Tablet., Normal, Disp-30 Tablet, R-0       Follow up:   Return in about 2 weeks (around 1/5/2023) for depression follow up. Pt was discussed with Dr. Pepe Kaplan (attending physician). I have reviewed pertinent labs results and other data. I have discussed the diagnosis with the patient and the intended plan as seen in the above orders. The patient has received an after-visit summary and questions were answered concerning future plans. I have discussed medication side effects and warnings with the patient as well.     Jay Alcaraz DO  PGY-2 Resident - [facility]  12/24/22

## 2022-12-24 RX ORDER — DEXTROAMPHETAMINE SACCHARATE, AMPHETAMINE ASPARTATE MONOHYDRATE, DEXTROAMPHETAMINE SULFATE AND AMPHETAMINE SULFATE 7.5; 7.5; 7.5; 7.5 MG/1; MG/1; MG/1; MG/1
30 CAPSULE, EXTENDED RELEASE ORAL
Qty: 30 CAPSULE | Refills: 0 | Status: SHIPPED | OUTPATIENT
Start: 2023-01-23

## 2022-12-24 RX ORDER — DEXTROAMPHETAMINE SACCHARATE, AMPHETAMINE ASPARTATE MONOHYDRATE, DEXTROAMPHETAMINE SULFATE AND AMPHETAMINE SULFATE 7.5; 7.5; 7.5; 7.5 MG/1; MG/1; MG/1; MG/1
30 CAPSULE, EXTENDED RELEASE ORAL DAILY
Qty: 30 CAPSULE | Refills: 0 | Status: SHIPPED | OUTPATIENT
Start: 2022-12-24

## 2022-12-24 RX ORDER — DEXTROAMPHETAMINE SACCHARATE, AMPHETAMINE ASPARTATE, DEXTROAMPHETAMINE SULFATE AND AMPHETAMINE SULFATE 7.5; 7.5; 7.5; 7.5 MG/1; MG/1; MG/1; MG/1
30 TABLET ORAL DAILY
Qty: 30 TABLET | Refills: 0 | Status: SHIPPED | OUTPATIENT
Start: 2023-02-22

## 2022-12-28 ENCOUNTER — TELEPHONE (OUTPATIENT)
Dept: FAMILY MEDICINE CLINIC | Age: 62
End: 2022-12-28

## 2022-12-28 NOTE — TELEPHONE ENCOUNTER
A representative from [facility] called stating that an appointment to get the ECHO done wouldn't be until February. Also, they wanted to clarify which Ultrasound was wanted and there may need to be a diagnostic mammogram as well. Thanks!

## 2022-12-28 NOTE — TELEPHONE ENCOUNTER
----- Message from Aden Guzmán sent at 12/28/2022  9:58 AM EST -----  Regarding: Echo  Dr Jean Lomeli,  I tried to make an appointment for the Echo and they can't do one until February. Do you want me to call [facility] or do you want to call them to see if they can do one. (I think they do them in office?)  Also the scheduling people are going to call about the Utrasound before scheduling it because they had a question.    Emy Smith  [phone]

## 2022-12-30 ENCOUNTER — TELEPHONE (OUTPATIENT)
Dept: FAMILY MEDICINE CLINIC | Age: 62
End: 2022-12-30

## 2022-12-30 DIAGNOSIS — R82.998 DARK URINE: Primary | ICD-10-CM

## 2022-12-30 NOTE — TELEPHONE ENCOUNTER
Called central sched. Got echo appt for 1/4 at 1PM. Talked with patient about appt time. She will call back to central sched regarding US. Dicussed with [place] sched why we couldn't do mammogram as she has b/l mastectomy. Pt reports two days of darker urine c/f for blood on one occasion. Reports this has resolved. She denies dysuria. Able to drop off a urine sample today or Tuesday. Discussed return/ER precautions. Appt scheduled for 1/10 already.     Binh Benavidez DO  PGY-2 Resident  [address] Medicine

## 2023-01-04 ENCOUNTER — HOSPITAL ENCOUNTER (OUTPATIENT)
Dept: NON INVASIVE DIAGNOSTICS | Age: 63
Discharge: HOME | End: 2023-01-04
Attending: STUDENT IN AN ORGANIZED HEALTH CARE EDUCATION/TRAINING PROGRAM
Payer: COMMERCIAL

## 2023-01-04 VITALS
BODY MASS INDEX: 39.1 KG/M2 | DIASTOLIC BLOOD PRESSURE: 74 MMHG | SYSTOLIC BLOOD PRESSURE: 133 MMHG | HEIGHT: 63 IN | WEIGHT: 220.68 LBS

## 2023-01-04 DIAGNOSIS — R06.02 SOB (SHORTNESS OF BREATH) ON EXERTION: ICD-10-CM

## 2023-01-04 DIAGNOSIS — N63.0 MASS OF BREAST, UNSPECIFIED LATERALITY: ICD-10-CM

## 2023-01-04 DIAGNOSIS — R01.2: ICD-10-CM

## 2023-01-04 LAB
ECHO AO ARCH DIAM: 2.7 CM
ECHO AO ASC DIAM: 3.3 CM
ECHO AO ASCENDING AORTA INDEX: 1.63 CM/M2
ECHO AV AREA PEAK VELOCITY: 2.4 CM2
ECHO AV AREA/BSA PEAK VELOCITY: 1.2 CM2/M2
ECHO AV PEAK GRADIENT: 7 MMHG
ECHO AV PEAK VELOCITY: 1.4 M/S
ECHO AV VELOCITY RATIO: 0.79
ECHO LA DIAMETER INDEX: 1.83 CM/M2
ECHO LA DIAMETER: 3.7 CM
ECHO LA VOL 2C: 27 ML (ref 22–52)
ECHO LA VOL 4C: 25 ML (ref 22–52)
ECHO LA VOL BP: 26 ML (ref 22–52)
ECHO LA VOL/BSA BIPLANE: 13 ML/M2 (ref 16–34)
ECHO LA VOLUME AREA LENGTH: 29 ML
ECHO LA VOLUME INDEX A2C: 13 ML/M2 (ref 16–34)
ECHO LA VOLUME INDEX A4C: 12 ML/M2 (ref 16–34)
ECHO LA VOLUME INDEX AREA LENGTH: 14 ML/M2 (ref 16–34)
ECHO LV E' LATERAL VELOCITY: 10 CM/S
ECHO LV E' SEPTAL VELOCITY: 6 CM/S
ECHO LV EDV A2C: 57 ML
ECHO LV EDV A4C: 93 ML
ECHO LV EDV BP: 78 ML (ref 56–104)
ECHO LV EDV INDEX A4C: 46 ML/M2
ECHO LV EDV INDEX BP: 39 ML/M2
ECHO LV EDV NDEX A2C: 28 ML/M2
ECHO LV EJECTION FRACTION A2C: 53 %
ECHO LV EJECTION FRACTION A4C: 60 %
ECHO LV EJECTION FRACTION BIPLANE: 56 % (ref 55–100)
ECHO LV ESV A2C: 27 ML
ECHO LV ESV A4C: 38 ML
ECHO LV ESV BP: 34 ML (ref 19–49)
ECHO LV ESV INDEX A2C: 13 ML/M2
ECHO LV ESV INDEX A4C: 19 ML/M2
ECHO LV ESV INDEX BP: 17 ML/M2
ECHO LV FRACTIONAL SHORTENING: 21 % (ref 28–44)
ECHO LV INTERNAL DIMENSION DIASTOLE INDEX: 2.13 CM/M2
ECHO LV INTERNAL DIMENSION DIASTOLIC: 4.3 CM (ref 3.9–5.3)
ECHO LV INTERNAL DIMENSION SYSTOLIC INDEX: 1.68 CM/M2
ECHO LV INTERNAL DIMENSION SYSTOLIC: 3.4 CM
ECHO LV IVSD: 1 CM (ref 0.6–0.9)
ECHO LV MASS 2D: 142.5 G (ref 67–162)
ECHO LV MASS INDEX 2D: 70.5 G/M2 (ref 43–95)
ECHO LV POSTERIOR WALL DIASTOLIC: 1 CM (ref 0.6–0.9)
ECHO LV RELATIVE WALL THICKNESS RATIO: 0.47
ECHO LVOT AREA: 2.8 CM2
ECHO LVOT DIAM: 1.9 CM
ECHO LVOT MEAN GRADIENT: 3 MMHG
ECHO LVOT PEAK GRADIENT: 5 MMHG
ECHO LVOT PEAK VELOCITY: 1.1 M/S
ECHO LVOT STROKE VOLUME INDEX: 26.5 ML/M2
ECHO LVOT SV: 53.6 ML
ECHO LVOT VTI: 18.9 CM
ECHO MV A VELOCITY: 0.71 M/S
ECHO MV E DECELERATION TIME (DT): 176.9 MS
ECHO MV E VELOCITY: 0.53 M/S
ECHO MV E/A RATIO: 0.75
ECHO MV E/E' LATERAL: 5.3
ECHO MV E/E' RATIO (AVERAGED): 7.07
ECHO MV E/E' SEPTAL: 8.83
ECHO PV MAX VELOCITY: 1.1 M/S
ECHO PV PEAK GRADIENT: 4 MMHG
ECHO RV FREE WALL PEAK S': 8 CM/S
ECHO RV INTERNAL DIMENSION: 3.3 CM
ECHO RV TAPSE: 1.1 CM (ref 1.7–?)
ECHO TV REGURGITANT MAX VELOCITY: 2.17 M/S
ECHO TV REGURGITANT PEAK GRADIENT: 19 MMHG

## 2023-01-04 PROCEDURE — 93306 TTE W/DOPPLER COMPLETE: CPT | Performed by: STUDENT IN AN ORGANIZED HEALTH CARE EDUCATION/TRAINING PROGRAM

## 2023-01-04 PROCEDURE — 93306 TTE W/DOPPLER COMPLETE: CPT

## 2023-01-09 ENCOUNTER — HOSPITAL ENCOUNTER (OUTPATIENT)
Dept: MAMMOGRAPHY | Age: 63
Discharge: HOME | End: 2023-01-09
Attending: STUDENT IN AN ORGANIZED HEALTH CARE EDUCATION/TRAINING PROGRAM
Payer: COMMERCIAL

## 2023-01-09 DIAGNOSIS — N63.0 MASS OF BREAST, UNSPECIFIED LATERALITY: ICD-10-CM

## 2023-01-09 DIAGNOSIS — R22.33 AXILLARY LUMP, BILATERAL: ICD-10-CM

## 2023-01-09 PROCEDURE — 76882 US LMTD JT/FCL EVL NVASC XTR: CPT

## 2023-01-10 ENCOUNTER — OFFICE VISIT (OUTPATIENT)
Dept: FAMILY MEDICINE CLINIC | Age: 63
End: 2023-01-10
Payer: COMMERCIAL

## 2023-01-10 VITALS
DIASTOLIC BLOOD PRESSURE: 68 MMHG | RESPIRATION RATE: 16 BRPM | BODY MASS INDEX: 39.87 KG/M2 | WEIGHT: 225 LBS | HEART RATE: 93 BPM | OXYGEN SATURATION: 96 % | SYSTOLIC BLOOD PRESSURE: 106 MMHG | HEIGHT: 63 IN | TEMPERATURE: 98.3 F

## 2023-01-10 DIAGNOSIS — J06.9 UPPER RESPIRATORY TRACT INFECTION, UNSPECIFIED TYPE: ICD-10-CM

## 2023-01-10 DIAGNOSIS — C50.411 MALIGNANT NEOPLASM OF UPPER-OUTER QUADRANT OF RIGHT BREAST IN FEMALE, ESTROGEN RECEPTOR POSITIVE (HCC): ICD-10-CM

## 2023-01-10 DIAGNOSIS — Z17.0 MALIGNANT NEOPLASM OF UPPER-OUTER QUADRANT OF RIGHT BREAST IN FEMALE, ESTROGEN RECEPTOR POSITIVE (HCC): ICD-10-CM

## 2023-01-10 DIAGNOSIS — F06.31 DEPRESSION DUE TO PHYSICAL ILLNESS: Primary | ICD-10-CM

## 2023-01-10 DIAGNOSIS — R01.1 FLOW MURMUR: ICD-10-CM

## 2023-01-10 NOTE — PROGRESS NOTES
[address] [facility] [place]    Office [phone], Fax [phone]      Chief Complaint:     Chief Complaint   Patient presents with    Depression     Patient is coming in for a depression follow up. She also wanted mentioned she is having cough and congestion since weekend of new years. She tested negative for COVID various times. She mentioned taking a course of 5 days of cipro which helped her a lot. No other concerns. Subjective:   HPI:  Mojgan Kimble is a 65 y.o. female that presents for:    Breast cancer: s/p bilateral mastectomy. Had recent repeat US bilateral axilla which was negative. Talked with radiologist who said they don't do US of the chest for this. Still feeling abnormal tissue at site of mastectomy. Does report getting procedure where they tried to move fat from elsewhere and transplant into breast tissue. Doesn't want to proceed with MRI of chest at this time - that is what radiologist told her would be needed if concerned for recurrence. Cough/congestion: since [place]. Sore throat was really painful. Tested negative for covid at home 3x. Took 5 day course of cipro. Got a lot better after this. Feels better overall, but still lingering with mild congestion and voice hoarseness. Was taking mucinex which helped a lot. Depression: Doing better. Hasn't called counselor yet. Interested in [facility]. Denies passive/active SI. Family stress has improved which was the main stressor for her. Also has stress with work, trying to stay caught up on tasks. ROS: See HPI    Past medical history, social history, medications, and allergies personally reviewed.   Past Medical History:   Diagnosis Date    Anxiety     CAD (coronary artery disease)     CABG 1/3/2020    Calculus of kidney     Cancer ([facility] [address]     R breast cancer dx 3/2014; chemo, radiation and double mastectomy - Dr. Lloyd Kirby    Depression     Diabetes [facility])     Dyspepsia and other specified disorders of function of stomach Hypercholesterolemia     Liver disease     Neuropathy     MARISA (obstructive sleep apnea)     wears cpap    Other and unspecified hyperlipidemia     Psychiatric disorder     Snoring     Stroke [facility]) 1980's    ?slight stroke due to oral contraceptives    Thyroid disease     Type II or unspecified type diabetes mellitus without mention of complication, uncontrolled     Unspecified hypothyroidism         Medications:   Current Outpatient Medications   Medication Sig    amphetamine-dextroamphetamine XR (ADDERALL XR) 30 mg XR capsule Take 1 Capsule by mouth daily. Max Daily Amount: 30 mg. FLUoxetine (PROzac) 20 mg capsule TAKE 1 CAPSULE BY MOUTH EVERY DAY    FLUoxetine (PROzac) 10 mg capsule TAKE 1 CAPSULE BY MOUTH EVERY DAY    pregabalin (LYRICA) 100 mg capsule TAKE 1 CAPSULE BY MOUTH TWO (2) TIMES A DAY FOR 90 DAYS. MAX DAILY AMOUNT: 200 MG. rosuvastatin (CRESTOR) 20 mg tablet TAKE 1 TABLET BY MOUTH NIGHTLY    HUMULIN R U-500 (CONCENTRATED) INSULIN KWIKPEN 500 UNIT/ML INJECT 160 UNITS BEFORE BREAKFAST, 120 UNITS BEFORE DINNER SUBCUTANEOUSLY TWICE A DAY 30 DAYS    Dexcom G6 Sensor mary     furosemide (LASIX) 20 mg tablet TAKE 1 TABLET BY MOUTH EVERY DAY    metoprolol succinate (TOPROL-XL) 100 mg tablet TAKE 1 TABLET BY MOUTH EVERY DAY    aspirin delayed-release 81 mg tablet Take  by mouth daily. levothyroxine (SYNTHROID) 200 mcg tablet Take 200 mcg by mouth Daily (before breakfast). Insulin Needles, Disposable, (BD INSULIN PEN NEEDLE UF SHORT) 31 X 5/16 \" Ndle by Does Not Apply route two (2) times a day. [START ON 1/23/2023] amphetamine-dextroamphetamine XR (ADDERALL XR) 30 mg XR capsule Take 1 Capsule by mouth every morning. Max Daily Amount: 30 mg. [START ON 2/22/2023] dextroamphetamine-amphetamine (ADDERALL) 30 mg tablet Take 1 Tablet by mouth daily. Max Daily Amount: 1 Tablet.     Mounjaro 5 mg/0.5 mL pnij INJECT 5MG UNDER THE SKIN ONCE A WEEK    ACCU-CHEK COMPACT TEST strip      Current Facility-Administered Medications   Medication Dose Route Frequency    cyanocobalamin (VITAMIN B12) injection 1,000 mcg  1,000 mcg IntraMUSCular Q30D        Allergies: Allergies   Allergen Reactions    Ceftin [Cefuroxime Axetil] Hives    Trazodone Vertigo    Wellbutrin [Bupropion] Other (comments)     depression        Health Maintenance:  Health Maintenance Due   Topic Date Due    DTaP/Tdap/Td series (1 - Tdap) Never done    Shingles Vaccine (1 of 2) Never done    Low dose CT lung screening  Never done    Eye Exam Retinal or Dilated  06/15/2015    A1C test (Diabetic or Prediabetic)  07/28/2015    Colorectal Cancer Screening Combo  06/15/2020    Diabetic Alb to Cr ratio (uACR) test  01/20/2022    COVID-19 Vaccine (4 - Booster for Pfizer series) 02/10/2022    Lipid Screen  09/24/2022        Objective:   Vitals reviewed. Visit Vitals  /68 (BP 1 Location: Right upper arm, BP Patient Position: Sitting)   Pulse 93   Temp 98.3 °F (36.8 °C) (Oral)   Resp 16   Ht 5' 3\" (1.6 m)   Wt 225 lb (102.1 kg)   SpO2 96%   BMI 39.86 kg/m²        Physical Exam:  General Alert. No distress. Not diaphoretic. No jaundice, cyanosis, pallor. HENT Head Normocephalic and atraumatic. Eyes Conjunctivae pink, no discharge. No scleral icterus. EOMI. Cardio Normal rate, regular rhythm. No gallop or friction rub. No chest wall tenderness. Mild flow murmur noted. Pulmonary Effort normal. Breath sounds normal. No respiratory distress. No wheezes, rales, or rhonchi. No Stridor. Neurological No focal deficits. Skin Skin is warm and dry. No rash noted. No erythema. Harder feeling tissue noted at base of R breast. (Chaperoned by Beverley Andres LPN)   Psychiatric Mood, affect, and judgment normal.        Assessment/Plan:     1. Depression due to physical illness  Improved. Interested in [facility]. Will get scheduled. Denies SI/HI. Crisis resources provided.     2. Malignant neoplasm of upper-outer quadrant of right breast in female, estrogen receptor positive ([address]  Noted hard tissue at base of right breast. Not present on L breat. Most likely scar tissue but patient reports that this has changed in nature and given significant hx would benefit from further evaluation. Patient would like to proceed with MRI. Reassuring that BL axilla US normal.  -     MRI BREAST BI W WO CONT; Future    3. Upper respiratory tract infection, unspecified type   Overall improved. Return if symptoms worsen or fail to improve. 4. Flow murmur  Noted abnormal heart sound at last visit but hard to characterize. Obtained echo which was normal. On exam today sounds more like mild flow murmur but echo normal. Has upcoming appt with cardiology will send results to them. Follow up:   Return in about 1 week (around 1/17/2023) for [facility] appt. Pt was discussed with Dr. Roel Salter (attending physician). I have reviewed pertinent labs results and other data. I have discussed the diagnosis with the patient and the intended plan as seen in the above orders. The patient has received an after-visit summary and questions were answered concerning future plans. I have discussed medication side effects and warnings with the patient as well.     Rajesh Ahumada, DO  PGY-2 Resident - [facility]  01/10/23

## 2023-01-10 NOTE — PROGRESS NOTES
Darron Mcmanus is a 65 y.o. female    Chief Complaint   Patient presents with    Depression     Patient is coming in for a depression follow up. She also wanted mentioned she is having cough and congestion since weekend of new years. She tested negative for COVID various times. She mentioned taking a course of 5 days of cipro which helped her a lot. No other concerns. 1. Have you been to the ER, urgent care clinic since your last visit? Hospitalized since your last visit? No    2. Have you seen or consulted any other health care providers outside of the [address] [place] [address] since your last visit? Include any pap smears or colon screening. No      Visit Vitals  /68 (BP 1 Location: Right upper arm, BP Patient Position: Sitting)   Pulse 93   Temp 98.3 °F (36.8 °C) (Oral)   Resp 16   Ht 5' 3\" (1.6 m)   Wt 225 lb (102.1 kg)   SpO2 96%   BMI 39.86 kg/m²           Health Maintenance Due   Topic Date Due    DTaP/Tdap/Td series (1 - Tdap) Never done    Shingles Vaccine (1 of 2) Never done    Low dose CT lung screening  Never done    Eye Exam Retinal or Dilated  06/15/2015    A1C test (Diabetic or Prediabetic)  07/28/2015    Colorectal Cancer Screening Combo  06/15/2020    Diabetic Alb to Cr ratio (uACR) test  01/20/2022    COVID-19 Vaccine (4 - Booster for Pfizer series) 02/10/2022    Lipid Screen  09/24/2022         Medication Reconciliation completed, changes noted.   Please  Update medication list.

## 2023-01-10 NOTE — PATIENT INSTRUCTIONS
National Crisis Hotlines  - Call the Suicide and Crisis Lifeline at 65. - Call 9-896-497-KYPY (9-564.921.1238). - Text HOME to 886195 to access the Crisis Text Line.     [address] [facility]   Crisis Intervention - Available 24/7: [phone]  Address: [address] [facility] [address]   Office number: [phone]  Crisis Intervention - Available 24/7: [phone]  Address: Amalia [place], [address] [place] [address]

## 2023-02-02 ENCOUNTER — HOSPITAL ENCOUNTER (OUTPATIENT)
Dept: MRI IMAGING | Age: 63
Discharge: HOME | End: 2023-02-02
Attending: STUDENT IN AN ORGANIZED HEALTH CARE EDUCATION/TRAINING PROGRAM
Payer: COMMERCIAL

## 2023-02-02 DIAGNOSIS — C50.411 MALIGNANT NEOPLASM OF UPPER-OUTER QUADRANT OF RIGHT BREAST IN FEMALE, ESTROGEN RECEPTOR POSITIVE (HCC): ICD-10-CM

## 2023-02-02 DIAGNOSIS — Z17.0 MALIGNANT NEOPLASM OF UPPER-OUTER QUADRANT OF RIGHT BREAST IN FEMALE, ESTROGEN RECEPTOR POSITIVE (HCC): ICD-10-CM

## 2023-02-02 LAB — CREAT BLD-MCNC: 0.8 MG/DL (ref 0.6–1.3)

## 2023-02-02 PROCEDURE — 74011250636 HC RX REV CODE- 250/636: Performed by: STUDENT IN AN ORGANIZED HEALTH CARE EDUCATION/TRAINING PROGRAM

## 2023-02-02 PROCEDURE — 77049 MRI BREAST C-+ W/CAD BI: CPT

## 2023-02-02 PROCEDURE — 82565 ASSAY OF CREATININE: CPT

## 2023-02-02 PROCEDURE — A9585 GADOBUTROL INJECTION: HCPCS | Performed by: STUDENT IN AN ORGANIZED HEALTH CARE EDUCATION/TRAINING PROGRAM

## 2023-02-02 RX ADMIN — GADOBUTROL 10 ML: 604.72 INJECTION INTRAVENOUS at 09:54

## 2023-03-20 DIAGNOSIS — R41.840 POOR CONCENTRATION: ICD-10-CM

## 2023-03-20 DIAGNOSIS — R41.89 BRAIN FOG: ICD-10-CM

## 2023-03-20 RX ORDER — DEXTROAMPHETAMINE SACCHARATE, AMPHETAMINE ASPARTATE MONOHYDRATE, DEXTROAMPHETAMINE SULFATE AND AMPHETAMINE SULFATE 7.5; 7.5; 7.5; 7.5 MG/1; MG/1; MG/1; MG/1
30 CAPSULE, EXTENDED RELEASE ORAL
Qty: 30 CAPSULE | Refills: 0 | Status: CANCELLED | OUTPATIENT
Start: 2023-03-20

## 2023-03-21 ENCOUNTER — TELEPHONE (OUTPATIENT)
Dept: FAMILY MEDICINE CLINIC | Age: 63
End: 2023-03-21

## 2023-03-21 ENCOUNTER — PATIENT MESSAGE (OUTPATIENT)
Dept: FAMILY MEDICINE CLINIC | Age: 63
End: 2023-03-21

## 2023-03-21 DIAGNOSIS — R41.840 POOR CONCENTRATION: ICD-10-CM

## 2023-03-21 DIAGNOSIS — R41.89 BRAIN FOG: ICD-10-CM

## 2023-03-21 RX ORDER — DEXTROAMPHETAMINE SACCHARATE, AMPHETAMINE ASPARTATE MONOHYDRATE, DEXTROAMPHETAMINE SULFATE AND AMPHETAMINE SULFATE 7.5; 7.5; 7.5; 7.5 MG/1; MG/1; MG/1; MG/1
30 CAPSULE, EXTENDED RELEASE ORAL
Qty: 30 CAPSULE | Refills: 0 | Status: SHIPPED | OUTPATIENT
Start: 2023-04-19

## 2023-03-21 RX ORDER — DEXTROAMPHETAMINE SACCHARATE, AMPHETAMINE ASPARTATE MONOHYDRATE, DEXTROAMPHETAMINE SULFATE AND AMPHETAMINE SULFATE 7.5; 7.5; 7.5; 7.5 MG/1; MG/1; MG/1; MG/1
30 CAPSULE, EXTENDED RELEASE ORAL
Qty: 30 CAPSULE | Refills: 0 | Status: SHIPPED | OUTPATIENT
Start: 2023-03-21

## 2023-03-21 NOTE — TELEPHONE ENCOUNTER
Patient called stating that she wanted to speak with Dr. Kp Muir or her nurse in regards to a medication she has been trying to get. Would like to be contacted at your earliest convenience. Thanks!

## 2023-03-21 NOTE — TELEPHONE ENCOUNTER
Please refer to other note, Dr. Magaly Hodge is aware of the script and is actively working on it. She will send me a message to call the patient when the RX has been sent.

## 2023-03-21 NOTE — TELEPHONE ENCOUNTER
----- Message from JEAN PAUL Rodriguez sent at 0/74/0838 11:18 AM EDT -----  Regarding: FW: Adderall  Called pharmacy they said the medication is on back order and they requested a PA.  Called patient to inform status of medication.    ----- Message -----  From: Maikel Subramanian  Sent: 3/21/2023  10:51 AM EDT  To: [facility] Nurse  Subject: Adderall                                         Attached is the last one given to me

## 2023-03-21 NOTE — TELEPHONE ENCOUNTER
Called pharmacy and discussed it must be brand name. They told my nurse it required prior authorization but spoke with pharmacist who states they were able to successfully run it. Called patient to update, questions answered.     Vega Bourgeois DO  PGY-2 Resident  [address] Medicine

## 2023-03-29 ENCOUNTER — NURSE TRIAGE (OUTPATIENT)
Dept: OTHER | Facility: CLINIC | Age: 63
End: 2023-03-29

## 2023-03-29 ENCOUNTER — TELEPHONE (OUTPATIENT)
Dept: FAMILY MEDICINE CLINIC | Age: 63
End: 2023-03-29

## 2023-03-29 NOTE — TELEPHONE ENCOUNTER
Received call from Adrianna at [facility], caller not on line.      Complaint: losing balance, lightheaded    Practice Name: [facility]    Caller's telephone number verified as [phone]    Unsuccessful attempt to re-connect with caller via phone, left message for return call to office  Reason for Disposition   Message left on identified voicemail    Protocols used: No Contact or Duplicate Contact Call-ADULT-[place]

## 2023-03-29 NOTE — TELEPHONE ENCOUNTER
Patient called stating that she was returning a call from the nurse, but it was one of the nurses from nurse triage. She was sent to them for dizziness and lightheadedness. I asked the patient if she wanted to schedule an appointment and she denied and wanted to know if Dr. Vic Erickson had anything earlier than 4/13. She would like to be contacted at your earliest convenience. Thanks!

## 2023-03-31 NOTE — TELEPHONE ENCOUNTER
Attempted to reach patient to get her scheduled for a sooner appointment to be seen with another provider. Dr. Jean Lomeli does not have any appointments sooner then 4/13. Left vm for patient to give us a call back to schedule.

## 2023-04-14 ENCOUNTER — HOSPITAL ENCOUNTER (OUTPATIENT)
Dept: MRI IMAGING | Age: 63
Discharge: HOME | End: 2023-04-14
Attending: FAMILY MEDICINE
Payer: COMMERCIAL

## 2023-04-14 ENCOUNTER — LAB ONLY (OUTPATIENT)
Dept: FAMILY MEDICINE CLINIC | Age: 63
End: 2023-04-14

## 2023-04-14 DIAGNOSIS — R27.8 CEREBELLAR DYSMETRIA: ICD-10-CM

## 2023-04-14 DIAGNOSIS — R26.89 BALANCE PROBLEM: ICD-10-CM

## 2023-04-14 DIAGNOSIS — G52.1: ICD-10-CM

## 2023-04-14 DIAGNOSIS — G52.8: ICD-10-CM

## 2023-04-14 PROCEDURE — 70551 MRI BRAIN STEM W/O DYE: CPT

## 2023-04-15 LAB
ALBUMIN SERPL-MCNC: 4.6 G/DL (ref 3.8–4.8)
ALBUMIN/GLOB SERPL: 2.1 {RATIO} (ref 1.2–2.2)
ALP SERPL-CCNC: 90 IU/L (ref 44–121)
ALT SERPL-CCNC: 34 IU/L (ref 0–32)
AST SERPL-CCNC: 36 IU/L (ref 0–40)
BASOPHILS # BLD AUTO: 0.1 X10E3/UL (ref 0–0.2)
BASOPHILS NFR BLD AUTO: 1 %
BILIRUB SERPL-MCNC: 0.5 MG/DL (ref 0–1.2)
BUN SERPL-MCNC: 8 MG/DL (ref 8–27)
BUN/CREAT SERPL: 10 (ref 12–28)
CALCIUM SERPL-MCNC: 9.6 MG/DL (ref 8.7–10.3)
CHLORIDE SERPL-SCNC: 101 MMOL/L (ref 96–106)
CHOLEST SERPL-MCNC: 128 MG/DL (ref 100–199)
CO2 SERPL-SCNC: 27 MMOL/L (ref 20–29)
CREAT SERPL-MCNC: 0.82 MG/DL (ref 0.57–1)
EGFRCR SERPLBLD CKD-EPI 2021: 81 ML/MIN/1.73
EOSINOPHIL # BLD AUTO: 0.1 X10E3/UL (ref 0–0.4)
EOSINOPHIL NFR BLD AUTO: 2 %
ERYTHROCYTE [DISTWIDTH] IN BLOOD BY AUTOMATED COUNT: 13.4 % (ref 11.7–15.4)
GLOBULIN SER CALC-MCNC: 2.2 G/DL (ref 1.5–4.5)
GLUCOSE SERPL-MCNC: 142 MG/DL (ref 70–99)
HCT VFR BLD AUTO: 45.3 % (ref 34–46.6)
HDLC SERPL-MCNC: 34 MG/DL
HGB BLD-MCNC: 15.1 G/DL (ref 11.1–15.9)
IMM GRANULOCYTES # BLD AUTO: 0 X10E3/UL (ref 0–0.1)
IMM GRANULOCYTES NFR BLD AUTO: 0 %
IMP & REVIEW OF LAB RESULTS: NORMAL
LDLC SERPL CALC-MCNC: 71 MG/DL (ref 0–99)
LYMPHOCYTES # BLD AUTO: 2.9 X10E3/UL (ref 0.7–3.1)
LYMPHOCYTES NFR BLD AUTO: 39 %
MCH RBC QN AUTO: 29.9 PG (ref 26.6–33)
MCHC RBC AUTO-ENTMCNC: 33.3 G/DL (ref 31.5–35.7)
MCV RBC AUTO: 90 FL (ref 79–97)
MONOCYTES # BLD AUTO: 0.8 X10E3/UL (ref 0.1–0.9)
MONOCYTES NFR BLD AUTO: 11 %
NEUTROPHILS # BLD AUTO: 3.7 X10E3/UL (ref 1.4–7)
NEUTROPHILS NFR BLD AUTO: 47 %
PLATELET # BLD AUTO: 248 X10E3/UL (ref 150–450)
POTASSIUM SERPL-SCNC: 3.7 MMOL/L (ref 3.5–5.2)
PROT SERPL-MCNC: 6.8 G/DL (ref 6–8.5)
RBC # BLD AUTO: 5.05 X10E6/UL (ref 3.77–5.28)
SODIUM SERPL-SCNC: 142 MMOL/L (ref 134–144)
TRIGL SERPL-MCNC: 128 MG/DL (ref 0–149)
TSH SERPL DL<=0.005 MIU/L-ACNC: 0.75 UIU/ML (ref 0.45–4.5)
VLDLC SERPL CALC-MCNC: 23 MG/DL (ref 5–40)
WBC # BLD AUTO: 7.6 X10E3/UL (ref 3.4–10.8)

## 2023-04-20 ENCOUNTER — OFFICE VISIT (OUTPATIENT)
Dept: FAMILY MEDICINE CLINIC | Age: 63
End: 2023-04-20

## 2023-04-20 ENCOUNTER — OFFICE VISIT (OUTPATIENT)
Dept: HEMATOLOGY | Age: 63
End: 2023-04-20

## 2023-04-20 VITALS
HEIGHT: 63 IN | WEIGHT: 224 LBS | RESPIRATION RATE: 17 BRPM | OXYGEN SATURATION: 96 % | TEMPERATURE: 97.9 F | BODY MASS INDEX: 39.69 KG/M2

## 2023-04-20 VITALS
SYSTOLIC BLOOD PRESSURE: 113 MMHG | BODY MASS INDEX: 39.69 KG/M2 | TEMPERATURE: 97.4 F | WEIGHT: 224 LBS | HEIGHT: 63 IN | DIASTOLIC BLOOD PRESSURE: 66 MMHG | HEART RATE: 93 BPM | OXYGEN SATURATION: 96 %

## 2023-04-20 DIAGNOSIS — R26.89 BALANCE PROBLEM: Primary | ICD-10-CM

## 2023-04-20 DIAGNOSIS — H93.13 TINNITUS OF BOTH EARS: ICD-10-CM

## 2023-04-20 DIAGNOSIS — E11.40 TYPE 2 DIABETES MELLITUS WITH DIABETIC NEUROPATHY, WITH LONG-TERM CURRENT USE OF INSULIN (HCC): ICD-10-CM

## 2023-04-20 DIAGNOSIS — K75.81 NASH (NONALCOHOLIC STEATOHEPATITIS): Primary | ICD-10-CM

## 2023-04-20 DIAGNOSIS — R41.89 BRAIN FOG: ICD-10-CM

## 2023-04-20 DIAGNOSIS — Z79.4 TYPE 2 DIABETES MELLITUS WITH DIABETIC NEUROPATHY, WITH LONG-TERM CURRENT USE OF INSULIN (HCC): ICD-10-CM

## 2023-04-20 NOTE — PROGRESS NOTES
[address] [facility]   Office [phone], Fax [phone]      Chief Complaint:     Chief Complaint   Patient presents with    Hyperactivity     Subjective:   HPI:  Edi Tucker is a 65 y.o. female that presents for:    Losing balance:   - No room spinning   - Doesn't feel like it's dizziness  - feels like it's progressively gotten worse - now daily   - Brain MRI and labs with Dr. Doreen Yun   - will have issues with sitting to standing but can also just be standing   - Denies tunnel vision   - Ringing in the ears - has been 24/7  - Worried about superior canal dehiscence syndrome: therapist mom had this problem and sounds similar   - Has seen ENT previously     Brain fog 2/2 chemo hx:   - Chemo in 2014 for breast cancer   - Started on stimulant medication in 2018  - On Adderall XR 25mg by [facility] neuro oncology (reviewed notes from 2/2021) and increased to 30mg with improvement in symptoms  - Controlled substance agreement on file: yes signed 4/20/2023. - Compliance drug screen: appropriate 12/2022.   - Substance abuse history no  - Psych history: depression  - Prior medications tried Ritalin 5mg and Adderall XR 25mg   - Helps a lot with focus  - Denies symptoms on medication - no chest pain or palpitations    ROS: See HPI for pertinent ROS. Past medical history, social history, medications, and allergies personally reviewed.   Past Medical History:   Diagnosis Date    Anxiety     CAD (coronary artery disease)     CABG 1/3/2020    Calculus of kidney     Cancer ([facility] [address]     R breast cancer dx 3/2014; chemo, radiation and double mastectomy - Dr. Iman Collins    Depression     Diabetes [facility])     Dyspepsia and other specified disorders of function of stomach     Hypercholesterolemia     Liver disease     Neuropathy     MARISA (obstructive sleep apnea)     wears cpap    Other and unspecified hyperlipidemia     Psychiatric disorder     Snoring     Stroke ([address] 1980's    ?slight stroke due to oral contraceptives    Thyroid disease     Type II or unspecified type diabetes mellitus without mention of complication, uncontrolled     Unspecified hypothyroidism         Social Hx:   Alcohol Use: Not on file     Tobacco Use: Medium Risk    Smoking Tobacco Use: Former    Smokeless Tobacco Use: Never    Passive Exposure: Not on file       Medications:   Current Outpatient Medications   Medication Sig    multivit-mins no.63/iron/folic (M-VIT PO) Take  by mouth. TUMERIC-GING-OLIVE-OREG-CAPRYL PO Take  by mouth. amphetamine-dextroamphetamine XR (ADDERALL XR) 30 mg XR capsule Take 1 Capsule by mouth every morning. Max Daily Amount: 30 mg. Must be BRAND name. FLUoxetine (PROzac) 20 mg capsule TAKE 1 CAPSULE BY MOUTH EVERY DAY    FLUoxetine (PROzac) 10 mg capsule TAKE 1 CAPSULE BY MOUTH EVERY DAY    pregabalin (LYRICA) 100 mg capsule TAKE 1 CAPSULE BY MOUTH TWO (2) TIMES A DAY FOR 90 DAYS. MAX DAILY AMOUNT: 200 MG. Mounjaro 5 mg/0.5 mL pnij INJECT 5MG UNDER THE SKIN ONCE A WEEK    rosuvastatin (CRESTOR) 20 mg tablet TAKE 1 TABLET BY MOUTH NIGHTLY    HUMULIN R U-500 (CONCENTRATED) INSULIN KWIKPEN 500 UNIT/ML INJECT 160 UNITS BEFORE BREAKFAST, 120 UNITS BEFORE DINNER SUBCUTANEOUSLY TWICE A DAY 30 DAYS    Dexcom G6 Sensor mary     furosemide (LASIX) 20 mg tablet TAKE 1 TABLET BY MOUTH EVERY DAY    metoprolol succinate (TOPROL-XL) 100 mg tablet TAKE 1 TABLET BY MOUTH EVERY DAY    aspirin delayed-release 81 mg tablet Take  by mouth daily. levothyroxine (SYNTHROID) 200 mcg tablet Take 1 Tablet by mouth Daily (before breakfast). ACCU-CHEK COMPACT TEST strip     Insulin Needles, Disposable, (BD INSULIN PEN NEEDLE UF SHORT) 31 X 5/16 \" Ndle by Does Not Apply route two (2) times a day. Current Facility-Administered Medications   Medication Dose Route Frequency    cyanocobalamin (VITAMIN B12) injection 1,000 mcg  1,000 mcg IntraMUSCular Q30D        Allergies:   Allergies   Allergen Reactions    Ceftin [Cefuroxime Axetil] Hives    Trazodone Vertigo    Wellbutrin [Bupropion] Other (comments)     depression        Health Maintenance:  Health Maintenance Due   Topic Date Due    DTaP/Tdap/Td series (1 - Tdap) Never done    Shingles Vaccine (1 of 2) Never done    Low dose CT lung screening  Never done    Eye Exam Retinal or Dilated  06/15/2015    Diabetic Alb to Cr ratio (uACR) test  07/28/2015    A1C test (Diabetic or Prediabetic)  07/28/2015    Colorectal Cancer Screening Combo  06/15/2020    COVID-19 Vaccine (4 - Booster for Pfizer series) 02/10/2022      Objective:   Vitals reviewed. Visit Vitals  Temp 97.9 °F (36.6 °C) (Temporal)   Resp 17   Ht 5' 3\" (1.6 m)   Wt 224 lb (101.6 kg)   SpO2 96%   BMI 39.68 kg/m²        Physical Exam:  General Alert. No distress. Not diaphoretic. No jaundice, cyanosis, pallor. HENT Head    Normocephalic and atraumatic. Eyes    Conjunctivae pink, no discharge. No scleral icterus. EOMI.   + Artemio-Hallpike on R, negative on L   Cardio Normal rate, regular rhythm. No murmur, gallop, or friction rub. No chest wall tenderness. Pulmonary Effort normal. No respiratory distress. No wheezes, rhales, or rhonchi. Abdominal Soft. Bowel sounds normal. No distension. No tenderness.  Deferred. Neurological No focal deficits. No speech changes or facial droop. Skin Skin is warm and dry. No rash noted. No erythema. Psychiatric Mood, affect, and judgment normal.        Assessment/Plan:   1. Balance problem  Patient with positive artemio-hallpike leading to concerns for BPPV. Orthostatics were also borderline - recommended continued hydration and slow transitions. Discussed Epley maneuver (she would like to try this at home). She will also follow up with ENT in order to address potential eustachian tube dysfunction given ringing in her ears. 2. Brain fog  PDMP reviewed and appropriate. UDS on file. Signed CSC today. No symptoms on current regimen.  Gave patient a paper script for her to take to pharmacies for the next two months due to issues with obtaining rx at pharmacy (sent May and June, already sent end of April electronically). Follow up:   Return in about 3 months (around 7/20/2023) for Brain fog follow up. Pt was discussed with Dr. Raeann Holloway (attending physician). I have reviewed pertinent labs results and other data. I have discussed the diagnosis with the patient and the intended plan as seen in the above orders. The patient has received an after-visit summary and questions were answered concerning future plans. I have discussed medication side effects and warnings with the patient as well.     Sandra To, DO  PGY-2 Resident - [facility]  04/20/23

## 2023-04-20 NOTE — PROGRESS NOTES
Chief Complaint   Patient presents with    Hyperactivity     1. Have you been to the ER, urgent care clinic since your last visit? Hospitalized since your last visit? No    2. Have you seen or consulted any other health care providers outside of the [address] [place] [address] since your last visit? Include any pap smears or colon screening.  No

## 2023-04-20 NOTE — PROGRESS NOTES
[address] [facility], [place], [address], [place], [place]      JOSEPHINE Rivera, PCNP-BC   Nicole Hammond, [facility]-   Zena Steel, FNP-C  Mynor Flores, FNP-C   Elena Bender, PCNP-BC      [address]   at [facility]   [address] [facility] [place], [address] [address]  22.   [phone]   FAX: [address]   at [address], [address] [place] [address], [address] [phone]   FAX: [phone]       Patient Care Team:  Dagmar Dyer MD as PCP - General (Family Medicine)  Vickie Higgins MD as Physician (Endocrinology Physician)  Jyotsna Justice MD (General Surgery)      Problem List  Date Reviewed: 4/20/2023            Codes Class Noted    Axillary mass, right ICD-10-CM: R22.31  ICD-9-CM: 782.2  8/1/2022        S/P mastectomy, bilateral ICD-10-CM: Z90.13  ICD-9-CM: V45.71  6/24/2022    Overview Signed 6/24/2022  9:29 AM by Lana Gaines, DO     DOES NOT GET MAMMOGRAMS ANYMORE              Diabetic polyneuropathy associated with type 2 diabetes mellitus ([facility] [address] ICD-10-CM: E11.42  ICD-9-CM: 250.60, 357.2  6/24/2022    Overview Signed 6/24/2022  9:40 AM by Lana Gaines, DO     improved with higher dose lyrica.     increase to 100mg BID   follow up 3 months   cont to see podiatry as needed              LO (nonalcoholic steatohepatitis) ICD-10-CM: K75.81  ICD-9-CM: 571.8  12/26/2021        Tinnitus of both ears ICD-10-CM: H93.13  ICD-9-CM: 388.30  12/26/2021        Coronary artery disease involving coronary bypass graft of native heart without angina pectoris ICD-10-CM: I25.810  ICD-9-CM: 414.05  12/21/2021    Overview Signed 12/21/2021  3:12 PM by Kristi Daugherty, NP     1/2020              Brain fog ICD-10-CM: R41.89  ICD-9-CM: 799.59  12/10/2021    Overview Addendum 12/10/2021 3:15 PM by Shashi Troncoso,      Onset since starting chemo for breast cancer   See notes in Media from 2/2021 from [address] [facility] Neuro Oncology              Poor concentration ICD-10-CM: R41.840  ICD-9-CM: 799.51  12/10/2021    Overview Signed 12/10/2021  3:16 PM by Shashi Troncoso DO     Onset while on chemo and persisted since   Takes Adderall 25mg XL daily  See neuro oncology notes in Media 2/2021              B12 deficiency ICD-10-CM: E53.8  ICD-9-CM: 266.2  10/27/2021    Overview Signed 4/12/2022 11:57 AM by Shashi Troncoso, DO     Stopped B12 injections 2/2022              Depression due to physical illness ICD-10-CM: F06.31  ICD-9-CM: 293.83  10/27/2021        Insomnia due to anxiety and fear ICD-10-CM: F51.05, F40.9  ICD-9-CM: 300.20, 327.02  10/27/2021        Chest wall mass ICD-10-CM: R22.2  ICD-9-CM: 786.6  5/29/2020        Class 2 severe obesity due to excess calories with serious comorbidity and body mass index (BMI) of 39.0 to 39.9 in adult [facility]) ICD-10-CM: E66.01, Z68.39  ICD-9-CM: 278.01, V85.39  6/13/2018        Lymphedema of upper extremity following lymphadenectomy ICD-10-CM: E89.89, I89.0  ICD-9-CM: 997.99, 457.1  7/6/2015        Osteopenia (Chronic) ICD-10-CM: M85.80  ICD-9-CM: 733.90  3/15/2014        Breast cancer of upper-outer quadrant of right female breast [facility]) ICD-10-CM: C50.411  ICD-9-CM: 174.4  3/5/2014        Type 2 diabetes mellitus with neurologic complication, with long-term current use of insulin ([facility] [address] ICD-10-CM: E11.49, Z79.4  ICD-9-CM: 250.60, V58.67  Unknown        Other and unspecified hyperlipidemia ICD-10-CM: E78.5  ICD-9-CM: 272.4  9/30/2010        Hypothyroidism ICD-10-CM: E03.9  ICD-9-CM: 244.9  9/30/2010           J Carlosobal Opitz is being seen at [address] for management of non-alcoholic steatohepatitis (OL).  The active problem list, all pertinent past medical history, medications,   liver histology, radiologic findings and laboratory findings related to the liver disorder were reviewed and discussed with the patient. The patient is a 65 y.o.  female who was found to have an enlarged liver on palpation and imaging 9/2021. Serologic evaluation for markers of chronic liver disease were negative. Ultrasound was performed in 11/01/2021. The results of the imaging suggested fatty liver disease. A liver biopsy was performed at [facility] in 12/06/2021. The results demonstrate Bridging Fibrosis and steatohepatitis involving approximately 25% of parenchyma. Ballooning is present. Dr. Nico Polanco reviewed the slides which demonstrated LO with bridging fibrosis. RISA 5 (122). Of note, the patient has a history of CAD with cardiac bypass surgery 1/2020. She notes a 20 lb weight gain since surgery. She also received chemotherapy and radiation for breast cancer in 2014. Tamoxifen was discontinued in 2019. Since the last office visit the patient has been struggling with vertigo and tinnitus. ENT evaluated and could not find an underlying cause. Andre Slack was started 10/2022. Weight is unchanged. The patient has the following symptoms which could be attributed to the liver disorder:  Pain in the right side over the liver, and swelling of the lower extremities. The patient is not experiencing the following symptoms which are commonly seen in this liver disorder:  Nausea, vomiting, yellowing of the eyes or skin, itching, or problems concentrating. The patient completes all daily activities without any functional limitations. She notes chronic Tinnitus. ASSESSMENT AND PLAN:  NAFLD  The diagnosis is based upon liver biopsy, imaging, features of metabolic syndrome, and   serologic studies that are negative for other causes of chronic liver disease. A liver biopsy performed in 12/2021 demonstrates Stage 3 bridging fibrosis. Assessment of liver fibrosis was performed with Fibroscan in the office today.   The result was 11.7 kPa which correlates with Stage 3 bridging fibrosis. The CAP score of 312 suggests hepatic steatosis. Have performed laboratory testing to monitor liver function and degree of liver injury. This included BMP, hepatic panel, CBC with platelet count and INR. Laboratory testing ordered by an outside provider from 4/13/2022 reviewed in detail. Follow-up testing ordered today. The liver transaminases, ALP, liver function and platelet count are normal.     If the patient looses 20% of current body weight, which is 42 pounds, down to a weight of of 175 pounds, steatosis should resolve. Once all steatosis has resolved inflammation will resolve. Fibrosis will gradually resolve and the liver could eventually be normal.     The Fibroscan can be repeated annually or as often as clinically indicated to assess for fibrosis progression and/or regression. Counseling for diet and weight loss in patients with confirmed or suspected NAFLD  The patient was counseled regarding diet and exercise to achieve weight loss. The best diet for patients with fatty liver is one very low in carbohydrates and enriched with protein. The patient was told not to consume any food products and drinks containing fructose as this enhances hepatic fat synthesis. Reinforced dietary changes and exercise. Screening for Hepatocellular Carcinoma  HCC screening is not necessary if the patient has no evidence of cirrhosis. Treatment of other medical problems in patients with chronic liver disease  There are no contraindications for the patient to take most medications that are necessary for treatment of other medical issues. The patient can take: Any medications utilized for treatment of DM.  Statins to treat hypercholesterolemia    Normal doses of acetaminophen, as recommended on the label of the bottle, are not hepatotoxic except in the setting of daily alcohol use, even in patients with cirrhosis and can be utilized for pain.    Counseling for alcohol in patients with chronic liver disease  The patient was counseled regarding alcohol consumption and the effect of alcohol on chronic liver disease. The patient has not consumed significant amounts of alcohol and has now discontinued all use. Vaccinations   Vaccination for viral hepatitis A and B is recommended since the patient has no serologic evidence of previous exposure or vaccination with immunity. Routine vaccinations against other bacterial and viral agents can be performed as indicated. Annual flu vaccination should be administered if indicated. ALLERGIES  Allergies   Allergen Reactions    Ceftin [Cefuroxime Axetil] Hives    Trazodone Vertigo    Wellbutrin [Bupropion] Other (comments)     depression       MEDICATIONS  Current Outpatient Medications   Medication Sig    multivit-mins no.63/iron/folic (M-VIT PO) Take  by mouth. TUMERIC-GING-OLIVE-OREG-CAPRYL PO Take  by mouth. amphetamine-dextroamphetamine XR (ADDERALL XR) 30 mg XR capsule Take 1 Capsule by mouth every morning. Max Daily Amount: 30 mg. Must be BRAND name. FLUoxetine (PROzac) 20 mg capsule TAKE 1 CAPSULE BY MOUTH EVERY DAY    FLUoxetine (PROzac) 10 mg capsule TAKE 1 CAPSULE BY MOUTH EVERY DAY    pregabalin (LYRICA) 100 mg capsule TAKE 1 CAPSULE BY MOUTH TWO (2) TIMES A DAY FOR 90 DAYS. MAX DAILY AMOUNT: 200 MG. Mounjaro 5 mg/0.5 mL pnij INJECT 5MG UNDER THE SKIN ONCE A WEEK    rosuvastatin (CRESTOR) 20 mg tablet TAKE 1 TABLET BY MOUTH NIGHTLY    HUMULIN R U-500 (CONCENTRATED) INSULIN KWIKPEN 500 UNIT/ML INJECT 160 UNITS BEFORE BREAKFAST, 120 UNITS BEFORE DINNER SUBCUTANEOUSLY TWICE A DAY 30 DAYS    Dexcom G6 Sensor mary     furosemide (LASIX) 20 mg tablet TAKE 1 TABLET BY MOUTH EVERY DAY    metoprolol succinate (TOPROL-XL) 100 mg tablet TAKE 1 TABLET BY MOUTH EVERY DAY    aspirin delayed-release 81 mg tablet Take  by mouth daily.     levothyroxine (SYNTHROID) 200 mcg tablet Take 1 Tablet by mouth Daily (before breakfast). ACCU-CHEK COMPACT TEST strip     Insulin Needles, Disposable, (BD INSULIN PEN NEEDLE UF SHORT) 31 X 5/16 \" Ndle by Does Not Apply route two (2) times a day. Current Facility-Administered Medications   Medication    cyanocobalamin (VITAMIN B12) injection 1,000 mcg     SYSTEM REVIEW NOT RELATED TO LIVER DISEASE OR REVIEWED ABOVE:  Constitution systems: Negative for fever, chills, weight gain, weight loss. Eyes: Negative for visual changes. ENT: Negative for sore throat, painful swallowing. Tinnitus, Vertigo. Respiratory: Negative for cough, hemoptysis, SOB. Cardiology: Negative for chest pain, palpitations. GI:  Negative for constipation or diarrhea. : Negative for urinary frequency, dysuria, hematuria, nocturia. Skin: Negative for rash. Hematology: Negative for easy bruising, blood clots. Musculo-skeletal: Negative for back pain, muscle pain, weakness. Neurologic: Negative for headaches, dizziness, vertigo, memory problems not related to HE. Psychology: Negative for anxiety, depression. FAMILY HISTORY:  The father has/had the following following chronic disease(s): Type 1 DM. The mother has/had the following chronic disease(s): COPD, Lung Cancer   of unknown cause. There is no family history of liver disease. There is no family history of immune disorders. SOCIAL HISTORY:  The patient is . The patient has 2 children, and 5 grandchildren. The patient stopped using tobacco products in . The patient has never consumed significant amounts of alcohol and had discontinued all use. The patient currently works full-time as an .       PHYSICAL EXAMINATION:  Visit Vitals  /66 (BP 1 Location: Left upper arm, BP Patient Position: Sitting, BP Cuff Size: Adult)   Pulse 93   Temp 97.4 °F (36.3 °C) (Temporal)   Ht 5' 3\" (1.6 m)   Wt 224 lb (101.6 kg)   SpO2 96%   BMI 39.68 kg/m²       General: No acute distress. Eyes: Sclera anicteric. ENT: No oral lesions. Thyroid normal.  Nodes: No adenopathy. Skin: No spider angiomata. No jaundice. No palmar erythema. Respiratory: Lungs clear to auscultation. Cardiovascular: Regular heart rate. No murmurs. No JVD. Abdomen: Soft non-tender, liver size normal to percussion/palpation. Spleen not palpable. No obvious ascites. Extremities: No edema. No muscle wasting. No gross arthritic changes. Neurologic: Alert and oriented. Cranial nerves grossly intact. No asterixis. LABORATORY STUDIES:  Liver [place] of [address] Units 4/14/2023   WBC 3.4 - 10.8 x10E3/uL 7.6   ANC 1.4 - 7.0 x10E3/uL 3.7   HGB 11.1 - 15.9 g/dL 15.1    - 450 x10E3/uL 248   INR 0.9 - 1.2    AST 0 - 40 IU/L 36   ALT 0 - 32 IU/L 34 (H)   Alk Phos 44 - 121 IU/L 90   Bili, Total 0.0 - 1.2 mg/dL 0.5   Bili, Direct 0.00 - 0.40 mg/dL    Albumin 3.8 - 4.8 g/dL 4.6   BUN 8 - 27 mg/dL 8   Creat 0.57 - 1.00 mg/dL 0.82   Creat (iSTAT) 0.6 - 1.3 mg/dL    Na 134 - 144 mmol/L 142   K 3.5 - 5.2 mmol/L 3.7   Cl 96 - 106 mmol/L 101   CO2 20 - 29 mmol/L 27   Glucose 70 - 99 mg/dL 142 (H)     From: 11/10/2022 PCP   AST/ALT/ALP/T Bili/ALB:35/37/86/0.4/3.9  WBC/HB/PLT:7.6/14.4/230  NA/BUN/CREAT:137/8/0.72    Cancer Screening Latest Ref Rng & Units 4/20/2023   AFP, Serum 0.0 - 9.2 ng/mL 2.4   AFP-L3% 0.0 - 9.9 % Comment     Laboratory testing from 4/14/2023 reviewed in detail. Additional testing included to evaluate progression or regression of disease. Laboratory testing results from today will be communicated by My Chart. SEROLOGIES:  Serologies Latest Ref Rng & Units 12/21/2021   Hep A Ab, Total Negative Negative   Hep B Surface Ag Negative Negative   Hep B Core Ab, Total Negative Negative   Hep B Surface AB QL  Non Reactive   Ferritin 15 - 150 ng/mL 148   Iron % Saturation 15 - 55 % 34     10/26/2021.  Anti-HCV negative, HBsAg negative, ASMA negative, ceruloplasmin negative, AMA negative, Alpha-1 antitrypsin 139, Ferritin 149, IHSAN negative     LIVER HISTOLOGY:  2021. Liver biopsy reviewed by MLS. Steatohepatitis with bridging fibrosis. Macrosteatosis/microsteatosis involving 10-25% of parenchyma. Ballooning of hepatocytes . RISA 5 (122). 2022. FibroScan performed at [facility]. EkPa was 12.4. IQR/med 11%. . The results suggested a fibrosis level of F3. The CAP score suggests there is hepatic steatosis. 2023. FibroScan performed at [facility]. EkPa was 11.7. IQR/med 17%. . The results suggested a fibrosis level of F3. The CAP score suggests there is hepatic steatosis. ENDOSCOPIC PROCEDURES:  Not available or performed    RADIOLOGY:  2021. Ultrasound of liver. Echogenic consistent with fatty liver. No liver mass lesions. No dilated bile ducts. No ascites. OTHER TESTIN2022. HGB A1C 7.4%    FOLLOW-UP:  All of the issues listed above in the Assessment and Plan were discussed with the patient. All questions were answered. The patient expressed a clear understanding of the above. [address] [facility] in 6 months for ongoing monitoring and treatment. ANTWAN Bacon-[facility] of [address] [address] [address],  [facility] 22.  [address] [facility]

## 2023-04-20 NOTE — PROGRESS NOTES
Identified pt with two pt identifiers(name and ). Reviewed record in preparation for visit and have obtained necessary documentation. Chief Complaint   Patient presents with    Follow-up     FIBROSCAN      Vitals:    23 1129   BP: 113/66   Pulse: 93   Temp: 97.4 °F (36.3 °C)   TempSrc: Temporal   SpO2: 96%   Weight: 224 lb (101.6 kg)   Height: 5' 3\" (1.6 m)   PainSc:   6   PainLoc: Flank       Health Maintenance Review: Patient reminded of \"due or due soon\" health maintenance. I have asked the patient to contact his/her primary care provider (PCP) for follow-up on his/her health maintenance. Coordination of Care Questionnaire:  :   1) Have you been to an emergency room, urgent care, or hospitalized since your last visit? If yes, where when, and reason for visit? no       2. Have seen or consulted any other health care provider since your last visit? If yes, where when, and reason for visit? YES per check up and medication refils      Patient is accompanied by self I have received verbal consent from Domonique Ellis to discuss any/all medical information while they are present in the room.

## 2023-04-21 LAB
AFP L3 MFR SERPL: NORMAL % (ref 0–9.9)
AFP SERPL-MCNC: 2.4 NG/ML (ref 0–9.2)

## 2023-05-25 ENCOUNTER — HOSPITAL ENCOUNTER (OUTPATIENT)
Facility: HOSPITAL | Age: 63
Discharge: HOME | End: 2023-05-25
Payer: COMMERCIAL

## 2023-05-25 DIAGNOSIS — K75.81 NASH (NONALCOHOLIC STEATOHEPATITIS): ICD-10-CM

## 2023-05-25 PROCEDURE — 76700 US EXAM ABDOM COMPLETE: CPT

## 2023-05-27 DIAGNOSIS — F06.31 MOOD DISORDER DUE TO KNOWN PHYSIOLOGICAL CONDITION WITH DEPRESSIVE FEATURES: ICD-10-CM

## 2023-05-27 DIAGNOSIS — R41.89 BRAIN FOG: Primary | ICD-10-CM

## 2023-05-27 RX ORDER — DEXTROAMPHETAMINE SACCHARATE, AMPHETAMINE ASPARTATE MONOHYDRATE, DEXTROAMPHETAMINE SULFATE AND AMPHETAMINE SULFATE 7.5; 7.5; 7.5; 7.5 MG/1; MG/1; MG/1; MG/1
30 CAPSULE, EXTENDED RELEASE ORAL EVERY MORNING
Qty: 30 CAPSULE | Refills: 0 | Status: SHIPPED | OUTPATIENT
Start: 2023-05-27 | End: 2023-06-26

## 2023-05-30 RX ORDER — FLUOXETINE HYDROCHLORIDE 20 MG/1
CAPSULE ORAL
Qty: 90 CAPSULE | Refills: 1 | Status: SHIPPED | OUTPATIENT
Start: 2023-05-30

## 2023-05-30 RX ORDER — FLUOXETINE 10 MG/1
CAPSULE ORAL
Qty: 90 CAPSULE | Refills: 1 | Status: SHIPPED | OUTPATIENT
Start: 2023-05-30

## 2023-06-09 ENCOUNTER — HOSPITAL ENCOUNTER (OUTPATIENT)
Facility: HOSPITAL | Age: 63
Discharge: HOME | End: 2023-06-09
Attending: OTOLARYNGOLOGY
Payer: COMMERCIAL

## 2023-06-09 DIAGNOSIS — R42 DIZZINESS: ICD-10-CM

## 2023-06-09 DIAGNOSIS — Z00.8 ENCOUNTER FOR OTHER GENERAL EXAMINATION: ICD-10-CM

## 2023-06-09 DIAGNOSIS — H90.3 SENSORINEURAL HEARING LOSS, BILATERAL: ICD-10-CM

## 2023-06-09 DIAGNOSIS — Z87.891 FORMER SMOKER: ICD-10-CM

## 2023-06-09 DIAGNOSIS — H93.13 TINNITUS OF BOTH EARS: ICD-10-CM

## 2023-06-09 PROCEDURE — 70480 CT ORBIT/EAR/FOSSA W/O DYE: CPT

## 2023-06-21 DIAGNOSIS — R41.89 BRAIN FOG: ICD-10-CM

## 2023-06-21 RX ORDER — DEXTROAMPHETAMINE SACCHARATE, AMPHETAMINE ASPARTATE MONOHYDRATE, DEXTROAMPHETAMINE SULFATE AND AMPHETAMINE SULFATE 7.5; 7.5; 7.5; 7.5 MG/1; MG/1; MG/1; MG/1
30 CAPSULE, EXTENDED RELEASE ORAL EVERY MORNING
Qty: 30 CAPSULE | Refills: 0 | Status: SHIPPED | OUTPATIENT
Start: 2023-06-25 | End: 2023-07-25

## 2023-06-21 NOTE — PROGRESS NOTES
PDMP reviewed and appropriate. Discussed that she is okay with not taking sunosi as she rarely used it. Was prescribed previously by her sleep medicine physician. CSC on file. UDS appropriate. Refill provided. Needs follow up in July.     Rudi Jiang DO  PGY-2 Resident  [address] Medicine

## 2023-09-14 ENCOUNTER — TELEPHONE (OUTPATIENT)
Age: 63
End: 2023-09-14

## 2023-09-14 NOTE — TELEPHONE ENCOUNTER
Braulio Alejandre,    Just an FYI. I called patient to get her scheduled for an appointment. Patient stated that she is no longer a patient at this practice. I removed you from being her pcp.

## 2023-10-10 ENCOUNTER — CLINICAL DOCUMENTATION (OUTPATIENT)
Age: 63
End: 2023-10-10

## 2023-10-11 ENCOUNTER — HOSPITAL ENCOUNTER (OUTPATIENT)
Facility: HOSPITAL | Age: 63
Discharge: HOME | End: 2023-10-14
Payer: COMMERCIAL

## 2023-10-11 DIAGNOSIS — N95.8 OTHER SPECIFIED MENOPAUSAL AND PERIMENOPAUSAL DISORDERS: ICD-10-CM

## 2023-10-11 PROCEDURE — 77080 DXA BONE DENSITY AXIAL: CPT

## 2023-10-23 ENCOUNTER — OFFICE VISIT (OUTPATIENT)
Age: 63
End: 2023-10-23
Payer: COMMERCIAL

## 2023-10-23 VITALS
TEMPERATURE: 97 F | BODY MASS INDEX: 40.75 KG/M2 | DIASTOLIC BLOOD PRESSURE: 65 MMHG | WEIGHT: 230 LBS | OXYGEN SATURATION: 96 % | HEART RATE: 92 BPM | HEIGHT: 63 IN | SYSTOLIC BLOOD PRESSURE: 121 MMHG

## 2023-10-23 DIAGNOSIS — I70.8 ATHEROSCLEROSIS OF LEFT ILIAC ARTERY: ICD-10-CM

## 2023-10-23 DIAGNOSIS — R60.0 LOWER EXTREMITY EDEMA: ICD-10-CM

## 2023-10-23 DIAGNOSIS — K75.81 NASH (NONALCOHOLIC STEATOHEPATITIS): Primary | ICD-10-CM

## 2023-10-23 PROCEDURE — 99214 OFFICE O/P EST MOD 30 MIN: CPT | Performed by: NURSE PRACTITIONER

## 2023-10-24 LAB
ALBUMIN SERPL-MCNC: 4.6 G/DL (ref 3.9–4.9)
ALP SERPL-CCNC: 99 IU/L (ref 44–121)
ALT SERPL-CCNC: 28 IU/L (ref 0–32)
AST SERPL-CCNC: 24 IU/L (ref 0–40)
BASOPHILS # BLD AUTO: 0 X10E3/UL (ref 0–0.2)
BASOPHILS NFR BLD AUTO: 1 %
BILIRUB DIRECT SERPL-MCNC: 0.17 MG/DL (ref 0–0.4)
BILIRUB SERPL-MCNC: 0.5 MG/DL (ref 0–1.2)
BUN SERPL-MCNC: 7 MG/DL (ref 8–27)
BUN/CREAT SERPL: 9 (ref 12–28)
CALCIUM SERPL-MCNC: 9.3 MG/DL (ref 8.7–10.3)
CHLORIDE SERPL-SCNC: 101 MMOL/L (ref 96–106)
CO2 SERPL-SCNC: 26 MMOL/L (ref 20–29)
CREAT SERPL-MCNC: 0.79 MG/DL (ref 0.57–1)
EGFRCR SERPLBLD CKD-EPI 2021: 85 ML/MIN/1.73
EOSINOPHIL # BLD AUTO: 0.2 X10E3/UL (ref 0–0.4)
EOSINOPHIL NFR BLD AUTO: 2 %
ERYTHROCYTE [DISTWIDTH] IN BLOOD BY AUTOMATED COUNT: 12.9 % (ref 11.7–15.4)
GLUCOSE SERPL-MCNC: 147 MG/DL (ref 70–99)
HCT VFR BLD AUTO: 45.3 % (ref 34–46.6)
HGB BLD-MCNC: 15.3 G/DL (ref 11.1–15.9)
IMM GRANULOCYTES # BLD AUTO: 0 X10E3/UL (ref 0–0.1)
IMM GRANULOCYTES NFR BLD AUTO: 0 %
LYMPHOCYTES # BLD AUTO: 3.6 X10E3/UL (ref 0.7–3.1)
LYMPHOCYTES NFR BLD AUTO: 41 %
MCH RBC QN AUTO: 30 PG (ref 26.6–33)
MCHC RBC AUTO-ENTMCNC: 33.8 G/DL (ref 31.5–35.7)
MCV RBC AUTO: 89 FL (ref 79–97)
MONOCYTES # BLD AUTO: 0.8 X10E3/UL (ref 0.1–0.9)
MONOCYTES NFR BLD AUTO: 9 %
NEUTROPHILS # BLD AUTO: 4.2 X10E3/UL (ref 1.4–7)
NEUTROPHILS NFR BLD AUTO: 47 %
PLATELET # BLD AUTO: 223 X10E3/UL (ref 150–450)
POTASSIUM SERPL-SCNC: 3.8 MMOL/L (ref 3.5–5.2)
PROT SERPL-MCNC: 7.2 G/DL (ref 6–8.5)
RBC # BLD AUTO: 5.1 X10E6/UL (ref 3.77–5.28)
SODIUM SERPL-SCNC: 142 MMOL/L (ref 134–144)
WBC # BLD AUTO: 8.8 X10E3/UL (ref 3.4–10.8)

## 2023-11-21 ENCOUNTER — HOSPITAL ENCOUNTER (OUTPATIENT)
Facility: HOSPITAL | Age: 63
Discharge: HOME | End: 2023-11-24
Payer: COMMERCIAL

## 2023-11-21 DIAGNOSIS — Z87.891 PERSONAL HISTORY OF TOBACCO USE, PRESENTING HAZARDS TO HEALTH: ICD-10-CM

## 2023-11-21 DIAGNOSIS — F17.211 CIGARETTE NICOTINE DEPENDENCE IN REMISSION: ICD-10-CM

## 2023-11-21 DIAGNOSIS — R22.1 NECK FULLNESS: ICD-10-CM

## 2023-11-21 PROCEDURE — 71271 CT THORAX LUNG CANCER SCR C-: CPT

## 2023-11-21 PROCEDURE — 76536 US EXAM OF HEAD AND NECK: CPT

## 2024-04-22 ENCOUNTER — OFFICE VISIT (OUTPATIENT)
Age: 64
End: 2024-04-22
Payer: COMMERCIAL

## 2024-04-22 VITALS
RESPIRATION RATE: 16 BRPM | HEIGHT: 63 IN | BODY MASS INDEX: 40.57 KG/M2 | SYSTOLIC BLOOD PRESSURE: 106 MMHG | DIASTOLIC BLOOD PRESSURE: 61 MMHG | OXYGEN SATURATION: 97 % | WEIGHT: 229 LBS | TEMPERATURE: 97.3 F | HEART RATE: 91 BPM

## 2024-04-22 DIAGNOSIS — Z79.4 TYPE 2 DIABETES MELLITUS WITH DIABETIC NEUROPATHY, WITH LONG-TERM CURRENT USE OF INSULIN (HCC): ICD-10-CM

## 2024-04-22 DIAGNOSIS — K75.81 NASH (NONALCOHOLIC STEATOHEPATITIS): Primary | ICD-10-CM

## 2024-04-22 DIAGNOSIS — K75.81 NASH (NONALCOHOLIC STEATOHEPATITIS): ICD-10-CM

## 2024-04-22 DIAGNOSIS — E11.40 TYPE 2 DIABETES MELLITUS WITH DIABETIC NEUROPATHY, WITH LONG-TERM CURRENT USE OF INSULIN (HCC): ICD-10-CM

## 2024-04-22 DIAGNOSIS — E53.8 VITAMIN B12 DEFICIENCY: ICD-10-CM

## 2024-04-22 PROCEDURE — 91200 LIVER ELASTOGRAPHY: CPT | Performed by: NURSE PRACTITIONER

## 2024-04-22 PROCEDURE — 99214 OFFICE O/P EST MOD 30 MIN: CPT | Performed by: NURSE PRACTITIONER

## 2024-04-22 RX ORDER — INSULIN GLARGINE 100 [IU]/ML
INJECTION, SOLUTION SUBCUTANEOUS
COMMUNITY

## 2024-04-22 RX ORDER — LOSARTAN POTASSIUM 25 MG/1
25 TABLET ORAL DAILY
COMMUNITY

## 2024-04-22 RX ORDER — DICLOFENAC SODIUM 75 MG/1
TABLET, DELAYED RELEASE ORAL
COMMUNITY

## 2024-04-22 RX ORDER — DIPHENHYDRAMINE HCL 25 MG
CAPSULE ORAL
COMMUNITY

## 2024-04-22 RX ORDER — LANOLIN ALCOHOL/MO/W.PET/CERES
3 CREAM (GRAM) TOPICAL NIGHTLY
COMMUNITY
Start: 2024-02-20

## 2024-04-22 RX ORDER — FLUTICASONE FUROATE, UMECLIDINIUM BROMIDE AND VILANTEROL TRIFENATATE 100; 62.5; 25 UG/1; UG/1; UG/1
1 POWDER RESPIRATORY (INHALATION) DAILY
COMMUNITY

## 2024-04-22 RX ORDER — ACYCLOVIR 400 MG/1
TABLET ORAL
COMMUNITY
Start: 2024-04-16

## 2024-04-22 RX ORDER — CYCLOBENZAPRINE HCL 10 MG
10 TABLET ORAL 3 TIMES DAILY PRN
COMMUNITY
Start: 2024-03-21

## 2024-04-22 RX ORDER — TIRZEPATIDE 15 MG/.5ML
15 INJECTION, SOLUTION SUBCUTANEOUS WEEKLY
COMMUNITY
Start: 2024-04-16

## 2024-04-22 NOTE — PROGRESS NOTES
[facility]      Abdias Robert MD, FACP, FACG, FAASLD      Kaylan Greene, PA-C    Azeb Sam, [facility]   Ariana Forrestconner, [facility]   Havenjennyfer Brenner, [facility]  Jose Cruz, [facility]   Nani Nix, [facility]   Vanessa Deacon, [facility]   [address], [address]   [place]  [address] [phone]   FAX: [phone]  [facility]   [address], [address]   [place]  [address] [phone]   FAX: [phone]       Patient Care Team:  Rui Alejandro MD as PCP - General (Family Medicine)  Reece Sandoval MD as Physician  Laura Yu as Primary Care Provider (Nurse Practitioner Primary Care)    Patient Active Problem List   Diagnosis    Chest wall mass    Coronary artery disease involving coronary bypass graft of native heart without angina pectoris    Brain fog    Type 2 diabetes mellitus with neurologic complication, with long-term current use of insulin (HCC)    Lymphedema of upper extremity following lymphadenectomy    Insomnia due to anxiety and fear    Breast cancer of upper-outer quadrant of right female breast (HCC)    B12 deficiency    Depression due to physical illness    Osteopenia    Tinnitus of both ears    HALL (nonalcoholic steatohepatitis)    Hypothyroidism    Poor concentration    S/P mastectomy, bilateral    Class 2 severe obesity due to excess calories with serious comorbidity and body mass index (BMI) of 39.0 to 39.9 in adult (HCC)    Diabetic polyneuropathy associated with type 2 diabetes mellitus (HCC)    Axillary mass, right    Atherosclerosis of left iliac artery    Lower extremity edema       Latha Salmeron is being seen at [facility] for management of non-alcoholic steatohepatitis  (HALL). The active problem list, all pertinent past medical

## 2024-04-22 NOTE — PROGRESS NOTES
Identified pt with two pt identifiers(name and ). Reviewed record in preparation for visit and have obtained necessary documentation.  Vitals:    24 0835   BP: 106/61   Site: Left Upper Arm   Position: Sitting   Cuff Size: Large Adult   Pulse: 91   Resp: 16   Temp: 97.3 °F (36.3 °C)   TempSrc: Temporal   SpO2: 97%   Weight: 103.9 kg (229 lb)   Height: 1.6 m (5' 3\")        Health Maintenance Review: Patient reminded of \"due or due soon\" health maintenance. I have asked the patient to contact his/her primary care provider (PCP) for follow-up on his/her health maintenance.    Coordination of Care Questionnaire:  :   1) Have you been to an emergency room, urgent care, or hospitalized since your last visit?  If yes, where when, and reason for visit? no       2. Have seen or consulted any other health care provider since your last visit?   If yes, where when, and reason for visit?  Yes, f/u PCP       Patient is accompanied by self I have received verbal consent from Latha Salmeron to discuss any/all medical information while they are present in the room.

## 2024-04-23 LAB
ALBUMIN SERPL-MCNC: 4 G/DL (ref 3.9–4.9)
ALP SERPL-CCNC: 90 IU/L (ref 44–121)
ALT SERPL-CCNC: 22 IU/L (ref 0–32)
AST SERPL-CCNC: 20 IU/L (ref 0–40)
BILIRUB DIRECT SERPL-MCNC: 0.16 MG/DL (ref 0–0.4)
BILIRUB SERPL-MCNC: 0.5 MG/DL (ref 0–1.2)
BUN SERPL-MCNC: 7 MG/DL (ref 8–27)
BUN/CREAT SERPL: 10 (ref 12–28)
CALCIUM SERPL-MCNC: 9.9 MG/DL (ref 8.7–10.3)
CHLORIDE SERPL-SCNC: 100 MMOL/L (ref 96–106)
CO2 SERPL-SCNC: 26 MMOL/L (ref 20–29)
CREAT SERPL-MCNC: 0.71 MG/DL (ref 0.57–1)
EGFRCR SERPLBLD CKD-EPI 2021: 96 ML/MIN/1.73
GLUCOSE SERPL-MCNC: 184 MG/DL (ref 70–99)
POTASSIUM SERPL-SCNC: 3.6 MMOL/L (ref 3.5–5.2)
PROT SERPL-MCNC: 6.5 G/DL (ref 6–8.5)
SODIUM SERPL-SCNC: 139 MMOL/L (ref 134–144)
VIT B12 SERPL-MCNC: 284 PG/ML (ref 232–1245)

## 2024-04-26 LAB
BASOPHILS # BLD AUTO: 0.1 X10E3/UL (ref 0–0.2)
BASOPHILS NFR BLD AUTO: 2 %
EOSINOPHIL # BLD AUTO: 0.1 X10E3/UL (ref 0–0.4)
EOSINOPHIL NFR BLD AUTO: 2 %
ERYTHROCYTE [DISTWIDTH] IN BLOOD BY AUTOMATED COUNT: 13.6 % (ref 11.7–15.4)
HCT VFR BLD AUTO: 43.3 % (ref 34–46.6)
HGB BLD-MCNC: 14.1 G/DL (ref 11.1–15.9)
IMM GRANULOCYTES # BLD AUTO: 0 X10E3/UL (ref 0–0.1)
IMM GRANULOCYTES NFR BLD AUTO: 0 %
LYMPHOCYTES # BLD AUTO: 2.4 X10E3/UL (ref 0.7–3.1)
LYMPHOCYTES NFR BLD AUTO: 35 %
MCH RBC QN AUTO: 29.6 PG (ref 26.6–33)
MCHC RBC AUTO-ENTMCNC: 32.6 G/DL (ref 31.5–35.7)
MCV RBC AUTO: 91 FL (ref 79–97)
MONOCYTES # BLD AUTO: 0.4 X10E3/UL (ref 0.1–0.9)
MONOCYTES NFR BLD AUTO: 6 %
MORPHOLOGY BLD-IMP: NORMAL
NEUTROPHILS # BLD AUTO: 3.7 X10E3/UL (ref 1.4–7)
NEUTROPHILS NFR BLD AUTO: 55 %
PLATELET # BLD AUTO: 207 X10E3/UL (ref 150–450)
RBC # BLD AUTO: 4.77 X10E6/UL (ref 3.77–5.28)
WBC # BLD AUTO: 6.7 X10E3/UL (ref 3.4–10.8)

## 2024-08-22 ENCOUNTER — OFFICE VISIT (OUTPATIENT)
Age: 64
End: 2024-08-22
Payer: COMMERCIAL

## 2024-08-22 VITALS
HEART RATE: 95 BPM | DIASTOLIC BLOOD PRESSURE: 62 MMHG | TEMPERATURE: 97 F | HEIGHT: 63 IN | OXYGEN SATURATION: 98 % | WEIGHT: 222.2 LBS | SYSTOLIC BLOOD PRESSURE: 101 MMHG | BODY MASS INDEX: 39.37 KG/M2

## 2024-08-22 DIAGNOSIS — I95.2 HYPOTENSION DUE TO DRUGS: ICD-10-CM

## 2024-08-22 DIAGNOSIS — R60.0 LOWER EXTREMITY EDEMA: ICD-10-CM

## 2024-08-22 DIAGNOSIS — E66.01 CLASS 2 SEVERE OBESITY DUE TO EXCESS CALORIES WITH SERIOUS COMORBIDITY AND BODY MASS INDEX (BMI) OF 39.0 TO 39.9 IN ADULT (HCC): ICD-10-CM

## 2024-08-22 DIAGNOSIS — K75.81 NASH (NONALCOHOLIC STEATOHEPATITIS): Primary | ICD-10-CM

## 2024-08-22 PROCEDURE — 99214 OFFICE O/P EST MOD 30 MIN: CPT | Performed by: NURSE PRACTITIONER

## 2024-08-22 NOTE — PROGRESS NOTES
[facility]      Abdias Robert MD, FACP, FACG, FAASLD      Kaylan Greene, PA-C    Azeb Sam, [facility]   Ariana Forrestconner, [facility]   Havenjennyfer Brenner, [facility]  Jose Cruz, [facility]   Nani Nix, [facility]   Vanessa Deacon, [facility]   [address], [address]   [place]  [address] [phone]   FAX: [phone]  [facility]   [address], [address]   [place]  [address] [phone]   FAX: [phone]       Patient Care Team:  Rui Alejandro MD as PCP - General (Family Medicine)  Reece Sandoval MD as Physician  Laura Yu as Primary Care Provider (Nurse Practitioner Primary Care)    Patient Active Problem List   Diagnosis    Chest wall mass    Coronary artery disease involving coronary bypass graft of native heart without angina pectoris    Brain fog    Type 2 diabetes mellitus with neurologic complication, with long-term current use of insulin (HCC)    Lymphedema of upper extremity following lymphadenectomy    Insomnia due to anxiety and fear    Breast cancer of upper-outer quadrant of right female breast (HCC)    B12 deficiency    Depression due to physical illness    Osteopenia    Tinnitus of both ears    HALL (nonalcoholic steatohepatitis)    Hypothyroidism    Poor concentration    S/P mastectomy, bilateral    Class 2 severe obesity due to excess calories with serious comorbidity and body mass index (BMI) of 39.0 to 39.9 in adult (HCC)    Diabetic polyneuropathy associated with type 2 diabetes mellitus (HCC)    Axillary mass, right    Atherosclerosis of left iliac artery    Lower extremity edema       Latha Salmeron is being seen at [facility] for management of non-alcoholic steatohepatitis  (HALL). The active problem list, all pertinent past medical

## 2024-08-22 NOTE — PROGRESS NOTES
I have reviewed all needed documentation in preparation for visit. Verified patient by name and date of birth  Latha Salmeron is a 64 y.o. female Follow-up  .    Vitals:    08/22/24 1100 08/22/24 1101   BP: (!) 98/55 101/62   Pulse: 95    Temp: 97 °F (36.1 °C)    SpO2: 98%    Weight: 100.8 kg (222 lb 3.2 oz)    Height: 1.6 m (5' 3\")        No LMP recorded (lmp unknown). Patient is postmenopausal.         Health Maintenance Review: Patient reminded of \"due or due soon\" health maintenance. I have asked the patient to contact his/her primary care provider (PCP) for follow-up on his/her health maintenance.    \"Have you been to the ER, urgent care clinic since your last visit?  Hospitalized since your last visit?\"    NO    “Have you seen or consulted any other health care providers outside of [facility] since your last visit?”    NO

## 2024-08-23 LAB
AFP L3 MFR SERPL: NORMAL % (ref 0–9.9)
AFP SERPL-MCNC: 2.2 NG/ML (ref 0–9.2)
ALBUMIN SERPL-MCNC: 4.5 G/DL (ref 3.9–4.9)
ALP SERPL-CCNC: 97 IU/L (ref 44–121)
ALT SERPL-CCNC: 29 IU/L (ref 0–32)
AST SERPL-CCNC: 22 IU/L (ref 0–40)
BASOPHILS # BLD AUTO: 0.1 X10E3/UL (ref 0–0.2)
BASOPHILS NFR BLD AUTO: 1 %
BILIRUB DIRECT SERPL-MCNC: 0.2 MG/DL (ref 0–0.4)
BILIRUB SERPL-MCNC: 0.7 MG/DL (ref 0–1.2)
BUN SERPL-MCNC: 10 MG/DL (ref 8–27)
BUN/CREAT SERPL: 10 (ref 12–28)
CALCIUM SERPL-MCNC: 9.9 MG/DL (ref 8.7–10.3)
CHLORIDE SERPL-SCNC: 98 MMOL/L (ref 96–106)
CO2 SERPL-SCNC: 27 MMOL/L (ref 20–29)
CREAT SERPL-MCNC: 0.96 MG/DL (ref 0.57–1)
EGFRCR SERPLBLD CKD-EPI 2021: 66 ML/MIN/1.73
EOSINOPHIL # BLD AUTO: 0.3 X10E3/UL (ref 0–0.4)
EOSINOPHIL NFR BLD AUTO: 3 %
ERYTHROCYTE [DISTWIDTH] IN BLOOD BY AUTOMATED COUNT: 13.2 % (ref 11.7–15.4)
GLUCOSE SERPL-MCNC: 184 MG/DL (ref 70–99)
HCT VFR BLD AUTO: 49 % (ref 34–46.6)
HGB BLD-MCNC: 16.1 G/DL (ref 11.1–15.9)
IMM GRANULOCYTES # BLD AUTO: 0 X10E3/UL (ref 0–0.1)
IMM GRANULOCYTES NFR BLD AUTO: 0 %
INR PPP: 1 (ref 0.9–1.2)
LYMPHOCYTES # BLD AUTO: 2.9 X10E3/UL (ref 0.7–3.1)
LYMPHOCYTES NFR BLD AUTO: 30 %
MCH RBC QN AUTO: 30.1 PG (ref 26.6–33)
MCHC RBC AUTO-ENTMCNC: 32.9 G/DL (ref 31.5–35.7)
MCV RBC AUTO: 92 FL (ref 79–97)
MONOCYTES # BLD AUTO: 0.7 X10E3/UL (ref 0.1–0.9)
MONOCYTES NFR BLD AUTO: 8 %
NEUTROPHILS # BLD AUTO: 5.5 X10E3/UL (ref 1.4–7)
NEUTROPHILS NFR BLD AUTO: 58 %
PLATELET # BLD AUTO: 241 X10E3/UL (ref 150–450)
POTASSIUM SERPL-SCNC: 4.6 MMOL/L (ref 3.5–5.2)
PROT SERPL-MCNC: 7.2 G/DL (ref 6–8.5)
PROTHROMBIN TIME: 11.4 SEC (ref 9.1–12)
RBC # BLD AUTO: 5.35 X10E6/UL (ref 3.77–5.28)
SODIUM SERPL-SCNC: 138 MMOL/L (ref 134–144)
WBC # BLD AUTO: 9.4 X10E3/UL (ref 3.4–10.8)

## 2025-03-03 ENCOUNTER — OFFICE VISIT (OUTPATIENT)
Age: 65
End: 2025-03-03
Payer: COMMERCIAL

## 2025-03-03 VITALS
TEMPERATURE: 97.4 F | HEART RATE: 81 BPM | BODY MASS INDEX: 40.22 KG/M2 | WEIGHT: 227 LBS | DIASTOLIC BLOOD PRESSURE: 67 MMHG | OXYGEN SATURATION: 99 % | HEIGHT: 63 IN | SYSTOLIC BLOOD PRESSURE: 117 MMHG

## 2025-03-03 DIAGNOSIS — K75.81 NASH (NONALCOHOLIC STEATOHEPATITIS): Primary | ICD-10-CM

## 2025-03-03 PROCEDURE — 91200 LIVER ELASTOGRAPHY: CPT | Performed by: NURSE PRACTITIONER

## 2025-03-03 PROCEDURE — 99214 OFFICE O/P EST MOD 30 MIN: CPT | Performed by: NURSE PRACTITIONER

## 2025-03-03 ASSESSMENT — ANXIETY QUESTIONNAIRES
IF YOU CHECKED OFF ANY PROBLEMS ON THIS QUESTIONNAIRE, HOW DIFFICULT HAVE THESE PROBLEMS MADE IT FOR YOU TO DO YOUR WORK, TAKE CARE OF THINGS AT HOME, OR GET ALONG WITH OTHER PEOPLE: NOT DIFFICULT AT ALL
3. WORRYING TOO MUCH ABOUT DIFFERENT THINGS: NOT AT ALL
2. NOT BEING ABLE TO STOP OR CONTROL WORRYING: NOT AT ALL
4. TROUBLE RELAXING: NOT AT ALL
7. FEELING AFRAID AS IF SOMETHING AWFUL MIGHT HAPPEN: NOT AT ALL
6. BECOMING EASILY ANNOYED OR IRRITABLE: NOT AT ALL
5. BEING SO RESTLESS THAT IT IS HARD TO SIT STILL: NOT AT ALL
1. FEELING NERVOUS, ANXIOUS, OR ON EDGE: NOT AT ALL
GAD7 TOTAL SCORE: 0

## 2025-03-03 ASSESSMENT — PATIENT HEALTH QUESTIONNAIRE - PHQ9
3. TROUBLE FALLING OR STAYING ASLEEP: NOT AT ALL
DEPRESSION UNABLE TO ASSESS: FUNCTIONAL CAPACITY MOTIVATION LIMITS ACCURACY
SUM OF ALL RESPONSES TO PHQ QUESTIONS 1-9: 0
SUM OF ALL RESPONSES TO PHQ QUESTIONS 1-9: 0
9. THOUGHTS THAT YOU WOULD BE BETTER OFF DEAD, OR OF HURTING YOURSELF: NOT AT ALL
8. MOVING OR SPEAKING SO SLOWLY THAT OTHER PEOPLE COULD HAVE NOTICED. OR THE OPPOSITE, BEING SO FIGETY OR RESTLESS THAT YOU HAVE BEEN MOVING AROUND A LOT MORE THAN USUAL: NOT AT ALL
7. TROUBLE CONCENTRATING ON THINGS, SUCH AS READING THE NEWSPAPER OR WATCHING TELEVISION: NOT AT ALL
SUM OF ALL RESPONSES TO PHQ QUESTIONS 1-9: 0
SUM OF ALL RESPONSES TO PHQ QUESTIONS 1-9: 0
2. FEELING DOWN, DEPRESSED OR HOPELESS: NOT AT ALL
6. FEELING BAD ABOUT YOURSELF - OR THAT YOU ARE A FAILURE OR HAVE LET YOURSELF OR YOUR FAMILY DOWN: NOT AT ALL
4. FEELING TIRED OR HAVING LITTLE ENERGY: NOT AT ALL
5. POOR APPETITE OR OVEREATING: NOT AT ALL
10. IF YOU CHECKED OFF ANY PROBLEMS, HOW DIFFICULT HAVE THESE PROBLEMS MADE IT FOR YOU TO DO YOUR WORK, TAKE CARE OF THINGS AT HOME, OR GET ALONG WITH OTHER PEOPLE: NOT DIFFICULT AT ALL
1. LITTLE INTEREST OR PLEASURE IN DOING THINGS: NOT AT ALL

## 2025-03-03 NOTE — PROGRESS NOTES
Chief Complaint   Patient presents with    Follow-up     fibroscan     Vitals:    03/03/25 0931   BP: 117/67   Site: Left Upper Arm   Position: Sitting   Pulse: 81   Temp: 97.4 °F (36.3 °C)   TempSrc: Temporal   SpO2: 99%   Weight: 103 kg (227 lb)   Height: 1.6 m (5' 3\")     .  \"Have you been to the ER, urgent care clinic since your last visit?  Hospitalized since your last visit?\"    NO    “Have you seen or consulted any other health care providers outside of [facility] since your last visit?”    NO        “Have you had a colorectal cancer screening such as a colonoscopy/FIT/Cologuard?    NO    Date of last Colonoscopy: 6/15/2010  No cologuard on file  No FIT/FOBT on file   No flexible sigmoidoscopy on file         Click Here for Release of Records Request    
suggests there is hepatic steatosis.    3/2025.  FibroScan performed at [facility]. EkPa was 12.3.  IQR/med 28%.  .   The results suggested a fibrosis level of F3. The CAP score suggests there is hepatic steatosis.      ENDOSCOPIC PROCEDURES:  Not available or performed     RADIOLOGY:  2021.  Ultrasound of liver.  Echogenic consistent with fatty liver.  No liver mass lesions.  No dilated bile ducts.  No ascites.  2023. CT of abdomen without. Gallstone, hepatic steatosis. 2 stones right kidney and 3 stones on the left. Severe atherosclerosis. Stenosis of abdominal aorta.      OTHER TESTIN2022. HGB A1C 7.4%    FOLLOW-UP:  All of the issues listed above in the Assessment and Plan were discussed with the patient.  All questions were answered.  The patient expressed a clear understanding of the above.    Follow-up [facility] in 6 months for ongoing monitoring and treatment.       YOHAN UrenaPCNP-BC  [address], [address]  [place]  [address] [phone]  [facility]

## 2025-03-04 LAB
ALBUMIN SERPL-MCNC: 4.5 G/DL (ref 3.9–4.9)
ALP SERPL-CCNC: 107 IU/L (ref 44–121)
ALT SERPL-CCNC: 35 IU/L (ref 0–32)
AST SERPL-CCNC: 32 IU/L (ref 0–40)
BASOPHILS # BLD AUTO: 0 X10E3/UL (ref 0–0.2)
BASOPHILS NFR BLD AUTO: 1 %
BILIRUB DIRECT SERPL-MCNC: 0.14 MG/DL (ref 0–0.4)
BILIRUB SERPL-MCNC: 0.4 MG/DL (ref 0–1.2)
BUN SERPL-MCNC: 7 MG/DL (ref 8–27)
BUN/CREAT SERPL: 10 (ref 12–28)
CALCIUM SERPL-MCNC: 9.6 MG/DL (ref 8.7–10.3)
CHLORIDE SERPL-SCNC: 102 MMOL/L (ref 96–106)
CO2 SERPL-SCNC: 27 MMOL/L (ref 20–29)
CREAT SERPL-MCNC: 0.7 MG/DL (ref 0.57–1)
EGFRCR SERPLBLD CKD-EPI 2021: 97 ML/MIN/1.73
EOSINOPHIL # BLD AUTO: 0.1 X10E3/UL (ref 0–0.4)
EOSINOPHIL NFR BLD AUTO: 2 %
ERYTHROCYTE [DISTWIDTH] IN BLOOD BY AUTOMATED COUNT: 13.2 % (ref 11.7–15.4)
GLUCOSE SERPL-MCNC: 113 MG/DL (ref 70–99)
HCT VFR BLD AUTO: 45.8 % (ref 34–46.6)
HGB BLD-MCNC: 14.8 G/DL (ref 11.1–15.9)
IMM GRANULOCYTES # BLD AUTO: 0 X10E3/UL (ref 0–0.1)
IMM GRANULOCYTES NFR BLD AUTO: 0 %
INR PPP: 1 (ref 0.9–1.2)
LYMPHOCYTES # BLD AUTO: 2.3 X10E3/UL (ref 0.7–3.1)
LYMPHOCYTES NFR BLD AUTO: 36 %
MCH RBC QN AUTO: 29.7 PG (ref 26.6–33)
MCHC RBC AUTO-ENTMCNC: 32.3 G/DL (ref 31.5–35.7)
MCV RBC AUTO: 92 FL (ref 79–97)
MONOCYTES # BLD AUTO: 0.6 X10E3/UL (ref 0.1–0.9)
MONOCYTES NFR BLD AUTO: 9 %
NEUTROPHILS # BLD AUTO: 3.4 X10E3/UL (ref 1.4–7)
NEUTROPHILS NFR BLD AUTO: 52 %
PLATELET # BLD AUTO: 210 X10E3/UL (ref 150–450)
POTASSIUM SERPL-SCNC: 4.9 MMOL/L (ref 3.5–5.2)
PROT SERPL-MCNC: 6.8 G/DL (ref 6–8.5)
PROTHROMBIN TIME: 11 SEC (ref 9.1–12)
RBC # BLD AUTO: 4.98 X10E6/UL (ref 3.77–5.28)
SODIUM SERPL-SCNC: 142 MMOL/L (ref 134–144)
WBC # BLD AUTO: 6.4 X10E3/UL (ref 3.4–10.8)

## 2025-03-06 LAB
AFP L3 MFR SERPL: NORMAL % (ref 0–9.9)
AFP SERPL-MCNC: 2.2 NG/ML (ref 0–9.2)

## 2025-03-09 ENCOUNTER — RESULTS FOLLOW-UP (OUTPATIENT)
Age: 65
End: 2025-03-09

## 2025-03-13 ENCOUNTER — HOSPITAL ENCOUNTER (OUTPATIENT)
Facility: HOSPITAL | Age: 65
Discharge: HOME | End: 2025-03-16
Payer: COMMERCIAL

## 2025-03-13 DIAGNOSIS — K75.81 NASH (NONALCOHOLIC STEATOHEPATITIS): ICD-10-CM

## 2025-03-13 PROCEDURE — 76700 US EXAM ABDOM COMPLETE: CPT

## 2025-03-17 ENCOUNTER — RESULTS FOLLOW-UP (OUTPATIENT)
Facility: HOSPITAL | Age: 65
End: 2025-03-17

## 2025-06-26 ENCOUNTER — TRANSCRIBE ORDERS (OUTPATIENT)
Facility: HOSPITAL | Age: 65
End: 2025-06-26

## 2025-06-26 DIAGNOSIS — Z78.0 POSTMENOPAUSAL: Primary | ICD-10-CM

## 2025-08-15 ENCOUNTER — TELEPHONE (OUTPATIENT)
Age: 65
End: 2025-08-15

## 2025-09-02 ENCOUNTER — OFFICE VISIT (OUTPATIENT)
Age: 65
End: 2025-09-02
Payer: COMMERCIAL

## 2025-09-02 VITALS
TEMPERATURE: 97.9 F | BODY MASS INDEX: 37.95 KG/M2 | OXYGEN SATURATION: 95 % | DIASTOLIC BLOOD PRESSURE: 70 MMHG | HEART RATE: 82 BPM | WEIGHT: 214.2 LBS | SYSTOLIC BLOOD PRESSURE: 120 MMHG | HEIGHT: 63 IN

## 2025-09-02 DIAGNOSIS — K75.81 NASH (NONALCOHOLIC STEATOHEPATITIS): Primary | ICD-10-CM

## 2025-09-02 PROCEDURE — 99214 OFFICE O/P EST MOD 30 MIN: CPT | Performed by: NURSE PRACTITIONER

## 2025-09-02 PROCEDURE — 91200 LIVER ELASTOGRAPHY: CPT | Performed by: NURSE PRACTITIONER

## 2025-09-02 RX ORDER — DULOXETIN HYDROCHLORIDE 30 MG/1
CAPSULE, DELAYED RELEASE ORAL
COMMUNITY
Start: 2025-08-21

## 2025-09-02 ASSESSMENT — PATIENT HEALTH QUESTIONNAIRE - PHQ9
7. TROUBLE CONCENTRATING ON THINGS, SUCH AS READING THE NEWSPAPER OR WATCHING TELEVISION: NOT AT ALL
10. IF YOU CHECKED OFF ANY PROBLEMS, HOW DIFFICULT HAVE THESE PROBLEMS MADE IT FOR YOU TO DO YOUR WORK, TAKE CARE OF THINGS AT HOME, OR GET ALONG WITH OTHER PEOPLE: NOT DIFFICULT AT ALL
3. TROUBLE FALLING OR STAYING ASLEEP: NOT AT ALL
4. FEELING TIRED OR HAVING LITTLE ENERGY: NOT AT ALL
6. FEELING BAD ABOUT YOURSELF - OR THAT YOU ARE A FAILURE OR HAVE LET YOURSELF OR YOUR FAMILY DOWN: NOT AT ALL
SUM OF ALL RESPONSES TO PHQ QUESTIONS 1-9: 0
9. THOUGHTS THAT YOU WOULD BE BETTER OFF DEAD, OR OF HURTING YOURSELF: NOT AT ALL
5. POOR APPETITE OR OVEREATING: NOT AT ALL
8. MOVING OR SPEAKING SO SLOWLY THAT OTHER PEOPLE COULD HAVE NOTICED. OR THE OPPOSITE, BEING SO FIGETY OR RESTLESS THAT YOU HAVE BEEN MOVING AROUND A LOT MORE THAN USUAL: NOT AT ALL
1. LITTLE INTEREST OR PLEASURE IN DOING THINGS: NOT AT ALL
SUM OF ALL RESPONSES TO PHQ QUESTIONS 1-9: 0
SUM OF ALL RESPONSES TO PHQ QUESTIONS 1-9: 0
2. FEELING DOWN, DEPRESSED OR HOPELESS: NOT AT ALL
DEPRESSION UNABLE TO ASSESS: FUNCTIONAL CAPACITY MOTIVATION LIMITS ACCURACY
SUM OF ALL RESPONSES TO PHQ QUESTIONS 1-9: 0

## 2025-09-02 ASSESSMENT — LIFESTYLE VARIABLES
HOW OFTEN DO YOU HAVE A DRINK CONTAINING ALCOHOL: NEVER
HOW MANY STANDARD DRINKS CONTAINING ALCOHOL DO YOU HAVE ON A TYPICAL DAY: PATIENT DOES NOT DRINK

## 2025-09-03 LAB
ALBUMIN SERPL-MCNC: 4.4 G/DL (ref 3.9–4.9)
ALP SERPL-CCNC: 136 IU/L (ref 44–121)
ALT SERPL-CCNC: 31 IU/L (ref 0–32)
AST SERPL-CCNC: 29 IU/L (ref 0–40)
BASOPHILS # BLD AUTO: 0 X10E3/UL (ref 0–0.2)
BASOPHILS NFR BLD AUTO: 0 %
BILIRUB SERPL-MCNC: 0.5 MG/DL (ref 0–1.2)
BUN SERPL-MCNC: 6 MG/DL (ref 8–27)
BUN/CREAT SERPL: 7 (ref 12–28)
CALCIUM SERPL-MCNC: 9.7 MG/DL (ref 8.7–10.3)
CHLORIDE SERPL-SCNC: 102 MMOL/L (ref 96–106)
CO2 SERPL-SCNC: 28 MMOL/L (ref 20–29)
CREAT SERPL-MCNC: 0.86 MG/DL (ref 0.57–1)
EGFRCR SERPLBLD CKD-EPI 2021: 75 ML/MIN/1.73
EOSINOPHIL # BLD AUTO: 0.2 X10E3/UL (ref 0–0.4)
EOSINOPHIL NFR BLD AUTO: 2 %
ERYTHROCYTE [DISTWIDTH] IN BLOOD BY AUTOMATED COUNT: 13.1 % (ref 11.7–15.4)
GLOBULIN SER CALC-MCNC: 2.5 G/DL (ref 1.5–4.5)
GLUCOSE SERPL-MCNC: 64 MG/DL (ref 70–99)
HCT VFR BLD AUTO: 48.6 % (ref 34–46.6)
HGB BLD-MCNC: 16.2 G/DL (ref 11.1–15.9)
IMM GRANULOCYTES # BLD AUTO: 0 X10E3/UL (ref 0–0.1)
IMM GRANULOCYTES NFR BLD AUTO: 0 %
LYMPHOCYTES # BLD AUTO: 3.8 X10E3/UL (ref 0.7–3.1)
LYMPHOCYTES NFR BLD AUTO: 41 %
MCH RBC QN AUTO: 29.7 PG (ref 26.6–33)
MCHC RBC AUTO-ENTMCNC: 33.3 G/DL (ref 31.5–35.7)
MCV RBC AUTO: 89 FL (ref 79–97)
MONOCYTES # BLD AUTO: 0.8 X10E3/UL (ref 0.1–0.9)
MONOCYTES NFR BLD AUTO: 9 %
NEUTROPHILS # BLD AUTO: 4.4 X10E3/UL (ref 1.4–7)
NEUTROPHILS NFR BLD AUTO: 48 %
PLATELET # BLD AUTO: 234 X10E3/UL (ref 150–450)
POTASSIUM SERPL-SCNC: 5.4 MMOL/L (ref 3.5–5.2)
PROT SERPL-MCNC: 6.9 G/DL (ref 6–8.5)
RBC # BLD AUTO: 5.46 X10E6/UL (ref 3.77–5.28)
SODIUM SERPL-SCNC: 142 MMOL/L (ref 134–144)
WBC # BLD AUTO: 9.2 X10E3/UL (ref 3.4–10.8)